# Patient Record
Sex: MALE | Race: WHITE | NOT HISPANIC OR LATINO | Employment: OTHER | URBAN - METROPOLITAN AREA
[De-identification: names, ages, dates, MRNs, and addresses within clinical notes are randomized per-mention and may not be internally consistent; named-entity substitution may affect disease eponyms.]

---

## 2017-01-13 ENCOUNTER — OFFICE VISIT (OUTPATIENT)
Dept: UROLOGY | Facility: CLINIC | Age: 57
End: 2017-01-13
Payer: MEDICARE

## 2017-01-13 ENCOUNTER — APPOINTMENT (OUTPATIENT)
Dept: LAB | Facility: HOSPITAL | Age: 57
End: 2017-01-13
Attending: UROLOGY
Payer: MEDICARE

## 2017-01-13 VITALS — HEART RATE: 65 BPM | SYSTOLIC BLOOD PRESSURE: 131 MMHG | DIASTOLIC BLOOD PRESSURE: 79 MMHG

## 2017-01-13 DIAGNOSIS — R31.29 MICROSCOPIC HEMATURIA: ICD-10-CM

## 2017-01-13 DIAGNOSIS — N30.10 IC (INTERSTITIAL CYSTITIS): Primary | ICD-10-CM

## 2017-01-13 PROCEDURE — 87086 URINE CULTURE/COLONY COUNT: CPT

## 2017-01-13 PROCEDURE — 88112 CYTOPATH CELL ENHANCE TECH: CPT | Performed by: PATHOLOGY

## 2017-01-13 PROCEDURE — 99213 OFFICE O/P EST LOW 20 MIN: CPT | Mod: S$GLB,,, | Performed by: UROLOGY

## 2017-01-13 PROCEDURE — 88112 CYTOPATH CELL ENHANCE TECH: CPT | Mod: 26,,, | Performed by: PATHOLOGY

## 2017-01-13 NOTE — H&P
Subjective:       Patient ID: Gera Doran is a 57 y.o. male.    Chief Complaint: LUTS with Hx of IC. Also noted to have microhematuria.    Past Medical History   Diagnosis Date    Arthritis     Asthma     Chemical exposure      TO LUNG    GERD (gastroesophageal reflux disease)     IC (interstitial cystitis)     RLS (restless legs syndrome)     Sleep apnea      NO MACHINE     Past Surgical History   Procedure Laterality Date    Appendectomy      Tonsils      Hernia repair      Sinus surgery      Stomach surgery for gerd      Tonsillectomy       History reviewed. No pertinent family history.  Social History     Social History    Marital status:      Spouse name: N/A    Number of children: N/A    Years of education: N/A     Social History Main Topics    Smoking status: Never Smoker    Smokeless tobacco: Never Used    Alcohol use Yes      Comment: OCC    Drug use: No    Sexual activity: Not Asked     Other Topics Concern    None     Social History Narrative       Current Outpatient Prescriptions   Medication Sig Dispense Refill    aspirin-salicylamide-caffeine (BC HEADACHE POWDER) 650-195-32 mg Pack Take 1 packet by mouth once daily.      COMBIVENT  mcg/actuation inhaler Inhale 2 puffs into the lungs every 6 (six) hours as needed.       COMBIVENT RESPIMAT  mcg/actuation inhaler       fluticasone (FLONASE) 50 mcg/actuation nasal spray 1 spray by Nasal route as needed.       omeprazole (PRILOSEC) 20 MG capsule Take 20 mg by mouth once daily.      sertraline (ZOLOFT) 100 MG tablet Take 100 mg by mouth once daily. 1/2 daily      [DISCONTINUED] alfuzosin (UROXATRAL) 10 mg Tb24 10 mg daily with breakfast.        No current facility-administered medications for this visit.      Review of patient's allergies indicates:   Allergen Reactions    Imitrex [sumatriptan succinate] Itching    Sulfa (sulfonamide antibiotics) Itching    Amoxicillin Itching       Review of Systems    All other systems reviewed and are negative.      Objective:      Vitals:    01/13/17 1055   BP: 131/79   Pulse: 65     Physical Exam   Constitutional: He appears well-developed.   HENT:   Head: Normocephalic.   Eyes: Pupils are equal, round, and reactive to light.   Cardiovascular: Normal rate.    Pulmonary/Chest: Effort normal.   Abdominal: Soft.   Genitourinary: Rectum normal, prostate normal and penis normal.          Assessment:       1. IC (interstitial cystitis)    2. Microscopic hematuria        Plan:       Cysto Hydrodistention.

## 2017-01-13 NOTE — PROGRESS NOTES
[facility].    Interstitial cystitis with lower urinary tract symptoms.    HISTORY OF PRESENT ILLNESS:  This 57-year-old male returns for routine recheck.    He has a history of interstitial cystitis and at his last visit on 11/10/2016,   he was placed on a trial of VESIcare, but this did not result in any improvement   in his symptoms.  He is coming today and he is requesting to have another   repeat cystoscopy and bladder hydrodistention done again.  He had had a similar   procedure done in February 2014 and he states the effects were excellent in   terms of improving his symptoms and he is thus coming for his preop orders to be   scheduled.  We also had discussed the possibility of Botox, but it was decided   that we will hold off on Botox on today's visit.    His last PSA was 3.3 on 11/10/2016.    UA today did reveal 1+ blood, 8 to 10 rbc's and pH 5.0.    FINAL IMPRESSION:  Interstitial cystitis with lower urinary tract symptoms,   microscopic hematuria of undetermined significance.    RECOMMENDATION:  We will obtain urine for C and S and cytology.  We will   tentatively schedule for cystoscopy and bladder hydrodistention under general   anesthesia on 01/23/2017.  It was also mentioned to the patient that if the   cytology comes back abnormal, we may need to obtain imaging studies that could   include a CT scan and there is a possibility of delaying the cystoscopy   depending on the results, and the patient stated that he understood and agreed.    We will contact him with the urine test results.      FARHANA  dd: 01/13/2017 14:26:25 (CST)  td: 01/14/2017 10:06:45 (CST)  Doc ID   #[id number]  Job ID #[id number]    CC:

## 2017-01-15 LAB — BACTERIA UR CULT: NO GROWTH

## 2017-01-18 ENCOUNTER — HOSPITAL ENCOUNTER (OUTPATIENT)
Dept: PREADMISSION TESTING | Facility: HOSPITAL | Age: 57
Discharge: HOME | End: 2017-01-18
Attending: UROLOGY
Payer: MEDICARE

## 2017-01-18 ENCOUNTER — HOSPITAL ENCOUNTER (OUTPATIENT)
Dept: RADIOLOGY | Facility: HOSPITAL | Age: 57
Discharge: HOME | End: 2017-01-18
Attending: UROLOGY
Payer: MEDICARE

## 2017-01-18 DIAGNOSIS — N30.10 IC (INTERSTITIAL CYSTITIS): Primary | ICD-10-CM

## 2017-01-18 DIAGNOSIS — R31.29 MICROSCOPIC HEMATURIA: ICD-10-CM

## 2017-01-18 LAB
ALBUMIN SERPL BCP-MCNC: 3.5 G/DL
ALP SERPL-CCNC: 62 U/L
ALT SERPL W/O P-5'-P-CCNC: 17 U/L
ANION GAP SERPL CALC-SCNC: 9 MMOL/L
APTT BLDCRRT: 25.7 SEC
AST SERPL-CCNC: 21 U/L
BASOPHILS NFR BLD: 0 %
BILIRUB SERPL-MCNC: 0.3 MG/DL
BUN SERPL-MCNC: 15 MG/DL
CALCIUM SERPL-MCNC: 8.8 MG/DL
CHLORIDE SERPL-SCNC: 108 MMOL/L
CO2 SERPL-SCNC: 25 MMOL/L
CREAT SERPL-MCNC: 1.2 MG/DL
DIFFERENTIAL METHOD: ABNORMAL
EOSINOPHIL NFR BLD: 2 %
ERYTHROCYTE [DISTWIDTH] IN BLOOD BY AUTOMATED COUNT: 13.4 %
EST. GFR  (AFRICAN AMERICAN): >60 ML/MIN/1.73 M^2
EST. GFR  (NON AFRICAN AMERICAN): >60 ML/MIN/1.73 M^2
GLUCOSE SERPL-MCNC: 102 MG/DL
HCT VFR BLD AUTO: 42 %
HGB BLD-MCNC: 14.1 G/DL
INR PPP: 0.9
LYMPHOCYTES NFR BLD: 68 %
MCH RBC QN AUTO: 31.1 PG
MCHC RBC AUTO-ENTMCNC: 33.5 %
MCV RBC AUTO: 93 FL
MONOCYTES NFR BLD: 3 %
NEUTROPHILS NFR BLD: 27 %
PLATELET # BLD AUTO: 202 K/UL
PMV BLD AUTO: 8 FL
POTASSIUM SERPL-SCNC: 4.1 MMOL/L
PROT SERPL-MCNC: 6.2 G/DL
PROTHROMBIN TIME: 9.9 SEC
RBC # BLD AUTO: 4.53 M/UL
SMUDGE CELLS BLD QL SMEAR: PRESENT
SODIUM SERPL-SCNC: 142 MMOL/L
WBC # BLD AUTO: 17.1 K/UL

## 2017-01-18 PROCEDURE — 36415 COLL VENOUS BLD VENIPUNCTURE: CPT

## 2017-01-18 PROCEDURE — 85007 BL SMEAR W/DIFF WBC COUNT: CPT

## 2017-01-18 PROCEDURE — 85060 BLOOD SMEAR INTERPRETATION: CPT | Mod: ,,, | Performed by: PATHOLOGY

## 2017-01-18 PROCEDURE — 85730 THROMBOPLASTIN TIME PARTIAL: CPT

## 2017-01-18 PROCEDURE — 85027 COMPLETE CBC AUTOMATED: CPT

## 2017-01-18 PROCEDURE — 85610 PROTHROMBIN TIME: CPT

## 2017-01-18 PROCEDURE — 71020 XR CHEST PA AND LATERAL PRE-OP: CPT | Mod: TC

## 2017-01-18 PROCEDURE — 93005 ELECTROCARDIOGRAM TRACING: CPT

## 2017-01-18 PROCEDURE — 99900103 DSU ONLY-NO CHARGE-INITIAL HR (STAT)

## 2017-01-18 PROCEDURE — 71020 XR CHEST PA AND LATERAL PRE-OP: CPT | Mod: 26,,, | Performed by: RADIOLOGY

## 2017-01-18 PROCEDURE — 80053 COMPREHEN METABOLIC PANEL: CPT

## 2017-01-18 PROCEDURE — 93010 ELECTROCARDIOGRAM REPORT: CPT | Mod: ,,, | Performed by: INTERNAL MEDICINE

## 2017-01-18 PROCEDURE — 99900104 DSU ONLY-NO CHARGE-EA ADD'L HR (STAT)

## 2017-01-20 ENCOUNTER — ANESTHESIA EVENT (OUTPATIENT)
Dept: SURGERY | Facility: HOSPITAL | Age: 57
End: 2017-01-20
Payer: MEDICARE

## 2017-01-23 ENCOUNTER — HOSPITAL ENCOUNTER (OUTPATIENT)
Facility: HOSPITAL | Age: 57
Discharge: HOME | End: 2017-01-23
Attending: UROLOGY | Admitting: UROLOGY
Payer: MEDICARE

## 2017-01-23 ENCOUNTER — ANESTHESIA (OUTPATIENT)
Dept: SURGERY | Facility: HOSPITAL | Age: 57
End: 2017-01-23
Payer: MEDICARE

## 2017-01-23 DIAGNOSIS — N30.10 IC (INTERSTITIAL CYSTITIS): ICD-10-CM

## 2017-01-23 DIAGNOSIS — R31.29 MICROSCOPIC HEMATURIA: ICD-10-CM

## 2017-01-23 LAB
BASOPHILS NFR BLD: 3 %
DIFFERENTIAL METHOD: ABNORMAL
EOSINOPHIL NFR BLD: 3 %
ERYTHROCYTE [DISTWIDTH] IN BLOOD BY AUTOMATED COUNT: 13.5 %
HCT VFR BLD AUTO: 39.2 %
HGB BLD-MCNC: 12.9 G/DL
LYMPHOCYTES NFR BLD: 65 %
MCH RBC QN AUTO: 30.7 PG
MCHC RBC AUTO-ENTMCNC: 33 %
MCV RBC AUTO: 93 FL
MONOCYTES NFR BLD: 3 %
NEUTROPHILS NFR BLD: 26 %
PATH REV BLD -IMP: NORMAL
PLATELET # BLD AUTO: 173 K/UL
PLATELET BLD QL SMEAR: ABNORMAL
PMV BLD AUTO: 7.8 FL
RBC # BLD AUTO: 4.22 M/UL
SMUDGE CELLS BLD QL SMEAR: PRESENT
WBC # BLD AUTO: 12 K/UL

## 2017-01-23 PROCEDURE — 85027 COMPLETE CBC AUTOMATED: CPT

## 2017-01-23 PROCEDURE — 36415 COLL VENOUS BLD VENIPUNCTURE: CPT

## 2017-01-23 PROCEDURE — 71000015 HC POSTOP RECOV 1ST HR: Performed by: UROLOGY

## 2017-01-23 PROCEDURE — 37000008 HC ANESTHESIA 1ST 15 MINUTES: Performed by: UROLOGY

## 2017-01-23 PROCEDURE — 99900103 DSU ONLY-NO CHARGE-INITIAL HR (STAT): Performed by: UROLOGY

## 2017-01-23 PROCEDURE — 63600175 PHARM REV CODE 636 W HCPCS: Performed by: NURSE ANESTHETIST, CERTIFIED REGISTERED

## 2017-01-23 PROCEDURE — 36000706: Performed by: UROLOGY

## 2017-01-23 PROCEDURE — 71000033 HC RECOVERY, INTIAL HOUR: Performed by: UROLOGY

## 2017-01-23 PROCEDURE — 25000003 PHARM REV CODE 250: Performed by: ANESTHESIOLOGY

## 2017-01-23 PROCEDURE — D9220A PRA ANESTHESIA: Mod: CRNA,,, | Performed by: NURSE ANESTHETIST, CERTIFIED REGISTERED

## 2017-01-23 PROCEDURE — 37000009 HC ANESTHESIA EA ADD 15 MINS: Performed by: UROLOGY

## 2017-01-23 PROCEDURE — 52260 CYSTOSCOPY AND TREATMENT: CPT | Mod: ,,, | Performed by: UROLOGY

## 2017-01-23 PROCEDURE — 25000003 PHARM REV CODE 250: Performed by: NURSE ANESTHETIST, CERTIFIED REGISTERED

## 2017-01-23 PROCEDURE — 99900104 DSU ONLY-NO CHARGE-EA ADD'L HR (STAT): Performed by: UROLOGY

## 2017-01-23 PROCEDURE — 63600175 PHARM REV CODE 636 W HCPCS

## 2017-01-23 PROCEDURE — 85007 BL SMEAR W/DIFF WBC COUNT: CPT

## 2017-01-23 PROCEDURE — 25000003 PHARM REV CODE 250: Performed by: UROLOGY

## 2017-01-23 PROCEDURE — D9220A PRA ANESTHESIA: Mod: ANES,,, | Performed by: ANESTHESIOLOGY

## 2017-01-23 PROCEDURE — 36000707: Performed by: UROLOGY

## 2017-01-23 RX ORDER — FENTANYL CITRATE 50 UG/ML
25 INJECTION, SOLUTION INTRAMUSCULAR; INTRAVENOUS EVERY 5 MIN PRN
Status: DISCONTINUED | OUTPATIENT
Start: 2017-01-23 | End: 2017-01-23 | Stop reason: HOSPADM

## 2017-01-23 RX ORDER — DEXAMETHASONE SODIUM PHOSPHATE 4 MG/ML
INJECTION, SOLUTION INTRA-ARTICULAR; INTRALESIONAL; INTRAMUSCULAR; INTRAVENOUS; SOFT TISSUE
Status: DISCONTINUED | OUTPATIENT
Start: 2017-01-23 | End: 2017-01-23

## 2017-01-23 RX ORDER — CIPROFLOXACIN 2 MG/ML
400 INJECTION, SOLUTION INTRAVENOUS
Status: DISCONTINUED | OUTPATIENT
Start: 2017-01-23 | End: 2017-01-23 | Stop reason: HOSPADM

## 2017-01-23 RX ORDER — ONDANSETRON HYDROCHLORIDE 2 MG/ML
INJECTION, SOLUTION INTRAMUSCULAR; INTRAVENOUS
Status: DISCONTINUED | OUTPATIENT
Start: 2017-01-23 | End: 2017-01-23

## 2017-01-23 RX ORDER — MEPERIDINE HYDROCHLORIDE 25 MG/ML
12.5 INJECTION INTRAMUSCULAR; INTRAVENOUS; SUBCUTANEOUS ONCE AS NEEDED
Status: DISCONTINUED | OUTPATIENT
Start: 2017-01-23 | End: 2017-01-23 | Stop reason: HOSPADM

## 2017-01-23 RX ORDER — FENTANYL CITRATE 50 UG/ML
INJECTION, SOLUTION INTRAMUSCULAR; INTRAVENOUS
Status: DISCONTINUED | OUTPATIENT
Start: 2017-01-23 | End: 2017-01-23

## 2017-01-23 RX ORDER — OXYCODONE HYDROCHLORIDE 5 MG/1
5 TABLET ORAL ONCE AS NEEDED
Status: COMPLETED | OUTPATIENT
Start: 2017-01-24 | End: 2017-01-23

## 2017-01-23 RX ORDER — HYDROCODONE BITARTRATE AND ACETAMINOPHEN 5; 325 MG/1; MG/1
1 TABLET ORAL EVERY 4 HOURS PRN
Status: CANCELLED | OUTPATIENT
Start: 2017-01-23

## 2017-01-23 RX ORDER — PROPOFOL 10 MG/ML
VIAL (ML) INTRAVENOUS
Status: DISCONTINUED | OUTPATIENT
Start: 2017-01-23 | End: 2017-01-23

## 2017-01-23 RX ORDER — SODIUM CHLORIDE 0.9 % (FLUSH) 0.9 %
3 SYRINGE (ML) INJECTION
Status: DISCONTINUED | OUTPATIENT
Start: 2017-01-23 | End: 2017-01-23 | Stop reason: HOSPADM

## 2017-01-23 RX ORDER — CIPROFLOXACIN 500 MG/1
500 TABLET ORAL 2 TIMES DAILY
Qty: 20 TABLET | Refills: 0 | Status: SHIPPED | OUTPATIENT
Start: 2017-01-23 | End: 2017-02-02

## 2017-01-23 RX ORDER — DIPHENHYDRAMINE HYDROCHLORIDE 50 MG/ML
25 INJECTION INTRAMUSCULAR; INTRAVENOUS EVERY 6 HOURS PRN
Status: DISCONTINUED | OUTPATIENT
Start: 2017-01-23 | End: 2017-01-23 | Stop reason: HOSPADM

## 2017-01-23 RX ORDER — LIDOCAINE HYDROCHLORIDE 10 MG/ML
1 INJECTION, SOLUTION EPIDURAL; INFILTRATION; INTRACAUDAL; PERINEURAL ONCE
Status: COMPLETED | OUTPATIENT
Start: 2017-01-23 | End: 2017-01-23

## 2017-01-23 RX ORDER — ONDANSETRON 2 MG/ML
4 INJECTION INTRAMUSCULAR; INTRAVENOUS ONCE
Status: DISCONTINUED | OUTPATIENT
Start: 2017-01-23 | End: 2017-01-23 | Stop reason: HOSPADM

## 2017-01-23 RX ORDER — HYDROCODONE BITARTRATE AND ACETAMINOPHEN 5; 325 MG/1; MG/1
1 TABLET ORAL
Qty: 20 TABLET | Refills: 0 | Status: SHIPPED | OUTPATIENT
Start: 2017-01-23 | End: 2017-02-10 | Stop reason: ALTCHOICE

## 2017-01-23 RX ORDER — LIDOCAINE HCL/PF 100 MG/5ML
SYRINGE (ML) INTRAVENOUS
Status: DISCONTINUED | OUTPATIENT
Start: 2017-01-23 | End: 2017-01-23

## 2017-01-23 RX ORDER — PHENAZOPYRIDINE HYDROCHLORIDE 100 MG/1
200 TABLET, FILM COATED ORAL
Qty: 20 TABLET | Refills: 0 | Status: SHIPPED | OUTPATIENT
Start: 2017-01-23 | End: 2017-01-27

## 2017-01-23 RX ORDER — MIDAZOLAM HYDROCHLORIDE 1 MG/ML
INJECTION INTRAMUSCULAR; INTRAVENOUS
Status: DISCONTINUED | OUTPATIENT
Start: 2017-01-23 | End: 2017-01-23

## 2017-01-23 RX ORDER — LIDOCAINE HYDROCHLORIDE 20 MG/ML
JELLY TOPICAL
Status: DISCONTINUED | OUTPATIENT
Start: 2017-01-23 | End: 2017-01-23 | Stop reason: HOSPADM

## 2017-01-23 RX ORDER — HYDROMORPHONE HYDROCHLORIDE 2 MG/ML
0.2 INJECTION, SOLUTION INTRAMUSCULAR; INTRAVENOUS; SUBCUTANEOUS EVERY 5 MIN PRN
Status: DISCONTINUED | OUTPATIENT
Start: 2017-01-23 | End: 2017-01-23 | Stop reason: HOSPADM

## 2017-01-23 RX ORDER — SODIUM CHLORIDE, SODIUM LACTATE, POTASSIUM CHLORIDE, CALCIUM CHLORIDE 600; 310; 30; 20 MG/100ML; MG/100ML; MG/100ML; MG/100ML
INJECTION, SOLUTION INTRAVENOUS CONTINUOUS
Status: DISCONTINUED | OUTPATIENT
Start: 2017-01-23 | End: 2017-01-23 | Stop reason: HOSPADM

## 2017-01-23 RX ORDER — CIPROFLOXACIN 2 MG/ML
INJECTION, SOLUTION INTRAVENOUS
Status: COMPLETED
Start: 2017-01-23 | End: 2017-01-23

## 2017-01-23 RX ORDER — PHENAZOPYRIDINE HYDROCHLORIDE 200 MG/1
200 TABLET, FILM COATED ORAL ONCE
Status: COMPLETED | OUTPATIENT
Start: 2017-01-23 | End: 2017-01-23

## 2017-01-23 RX ORDER — SODIUM CHLORIDE, SODIUM LACTATE, POTASSIUM CHLORIDE, CALCIUM CHLORIDE 600; 310; 30; 20 MG/100ML; MG/100ML; MG/100ML; MG/100ML
75 INJECTION, SOLUTION INTRAVENOUS CONTINUOUS
Status: DISCONTINUED | OUTPATIENT
Start: 2017-01-23 | End: 2017-01-23 | Stop reason: HOSPADM

## 2017-01-23 RX ADMIN — EPHEDRINE SULFATE 10 MG: 50 INJECTION, SOLUTION INTRAMUSCULAR; INTRAVENOUS; SUBCUTANEOUS at 09:01

## 2017-01-23 RX ADMIN — OXYCODONE HYDROCHLORIDE 5 MG: 5 TABLET ORAL at 09:01

## 2017-01-23 RX ADMIN — LIDOCAINE HYDROCHLORIDE 100 MG: 20 INJECTION, SOLUTION INTRAVENOUS at 08:01

## 2017-01-23 RX ADMIN — PHENAZOPYRIDINE HYDROCHLORIDE 200 MG: 200 TABLET ORAL at 09:01

## 2017-01-23 RX ADMIN — SODIUM CHLORIDE, SODIUM LACTATE, POTASSIUM CHLORIDE, AND CALCIUM CHLORIDE: .6; .31; .03; .02 INJECTION, SOLUTION INTRAVENOUS at 07:01

## 2017-01-23 RX ADMIN — ONDANSETRON 4 MG: 2 INJECTION, SOLUTION INTRAMUSCULAR; INTRAVENOUS at 08:01

## 2017-01-23 RX ADMIN — LIDOCAINE HYDROCHLORIDE 10 MG: 10 INJECTION, SOLUTION EPIDURAL; INFILTRATION; INTRACAUDAL; PERINEURAL at 07:01

## 2017-01-23 RX ADMIN — CIPROFLOXACIN 400 MG: 2 INJECTION, SOLUTION INTRAVENOUS at 08:01

## 2017-01-23 RX ADMIN — MIDAZOLAM HYDROCHLORIDE 2 MG: 1 INJECTION, SOLUTION INTRAMUSCULAR; INTRAVENOUS at 08:01

## 2017-01-23 RX ADMIN — FENTANYL CITRATE 100 MCG: 50 INJECTION, SOLUTION INTRAMUSCULAR; INTRAVENOUS at 08:01

## 2017-01-23 RX ADMIN — PROPOFOL 200 MG: 10 INJECTION, EMULSION INTRAVENOUS at 08:01

## 2017-01-23 RX ADMIN — DEXAMETHASONE SODIUM PHOSPHATE 4 MG: 4 INJECTION, SOLUTION INTRAMUSCULAR; INTRAVENOUS at 08:01

## 2017-01-23 NOTE — IP AVS SNAPSHOT
[address] [place] [address]  Phone: [phone]           Patient Discharge Instructions     Our goal is to set you up for success. This packet includes information on your condition, medications, and your home care. It will help you to care for yourself so you don't get sicker and need to go back to the hospital.     Please ask your nurse if you have any questions.        There are many details to remember when preparing to leave the hospital. Here is what you will need to do:    1. Take your medicine. If you are prescribed medications, review your Medication List in the following pages. You may have new medications to  at the pharmacy and others that you'll need to stop taking. Review the instructions for how and when to take your medications. Talk with your doctor or nurses if you are unsure of what to do.     2. Go to your follow-up appointments. Specific follow-up information is listed in the following pages. Your may be contacted by a transition nurse or clinical provider about future appointments. Be sure we have all of the phone numbers to reach you, if needed. Please contact your provider's office if you are unable to make an appointment.     3. Watch for warning signs. Your doctor or nurse will give you detailed warning signs to watch for and when to call for assistance. These instructions may also include educational information about your condition. If you experience any of warning signs to your health, call your doctor.               [facility]  Unless otherwise directed by your provider, please contact [facility], our nurse care line that is available for 24/7 assistance.     [phone] (toll-free)    Registered nurses in the [facility] provide clinical advisement, health education, appointment booking, and other advisory services.                    ** Verify the list of medication(s) below is accurate and up to date.  Carry this with you in case of emergency. If your medications have changed, please notify your healthcare provider.             Medication List      START taking these medications        Additional Info                      ciprofloxacin HCl 500 MG tablet   Commonly known as:  CIPRO   Quantity:  20 tablet   Refills:  0   Dose:  500 mg    Instructions:  Take 1 tablet (500 mg total) by mouth 2 (two) times daily.     Begin Date    AM    Noon    PM    Bedtime       hydrocodone-acetaminophen 5-325mg 5-325 mg per tablet   Commonly known as:  NORCO   Quantity:  20 tablet   Refills:  0   Dose:  1 tablet    Instructions:  Take 1 tablet by mouth every 4 to 6 hours as needed for Pain.     Begin Date    AM    Noon    PM    Bedtime       phenazopyridine 100 MG tablet   Commonly known as:  PYRIDIUM   Quantity:  20 tablet   Refills:  0   Dose:  200 mg    Instructions:  Take 2 tablets (200 mg total) by mouth 3 (three) times daily with meals.     Begin Date    AM    Noon    PM    Bedtime         CONTINUE taking these medications        Additional Info                      BC HEADACHE POWDER 650-195-32 mg Pack   Refills:  0   Dose:  1 packet   Generic drug:  aspirin-salicylamide-caffeine    Instructions:  Take 1 packet by mouth once daily.     Begin Date    AM    Noon    PM    Bedtime       * COMBIVENT  mcg/actuation inhaler   Refills:  0   Dose:  2 puff   Generic drug:  albuterol-ipratropium  mcg    Instructions:  Inhale 2 puffs into the lungs every 6 (six) hours as needed.     Begin Date    AM    Noon    PM    Bedtime       * COMBIVENT RESPIMAT  mcg/actuation inhaler   Refills:  0   Generic drug:  ipratropium-albuterol      Begin Date    AM    Noon    PM    Bedtime       fluticasone 50 mcg/actuation nasal spray   Commonly known as:  FLONASE   Refills:  0   Dose:  1 spray    Instructions:  1 spray by Nasal route as needed.     Begin Date    AM    Noon    PM    Bedtime       omeprazole 20 MG capsule   Commonly known  as:  PRILOSEC   Refills:  0   Dose:  20 mg    Instructions:  Take 20 mg by mouth once daily.     Begin Date    AM    Noon    PM    Bedtime       sertraline 100 MG tablet   Commonly known as:  ZOLOFT   Refills:  0   Dose:  100 mg   Indications:  DEPRESSION    Instructions:  Take 100 mg by mouth once daily. 1/2 daily     Begin Date    AM    Noon    PM    Bedtime       * Notice:  This list has 2 medication(s) that are the same as other medications prescribed for you. Read the directions carefully, and ask your doctor or other care provider to review them with you.      STOP taking these medications     alfuzosin 10 mg Tb24   Commonly known as:  UROXATRAL            Where to Get Your Medications      These medications were sent to [id number]  [place], [place] - [address]  [address], [place] [address]     Phone:  [phone]     ciprofloxacin HCl 500 MG tablet    phenazopyridine 100 MG tablet         You can get these medications from any pharmacy     Bring a paper prescription for each of these medications     hydrocodone-acetaminophen 5-325mg 5-325 mg per tablet                  Please bring to all follow up appointments:    1. A copy of your discharge instructions.  2. All medicines you are currently taking in their original bottles.  3. Identification and insurance card.    Please arrive 15 minutes ahead of scheduled appointment time.    Please call 24 hours in advance if you must reschedule your appointment and/or time.        Your Scheduled Appointments     Feb 10, 2017  9:30 AM CST   Established Patient Visit with Damian De La Cruz MD   [place] [facility]    [address] [facility] [place] [address] [phone]              Follow-up Information     Follow up In 3 weeks.        Discharge Instructions     Future Orders    Activity as tolerated     Diet general     Questions:    Total calories:      Fat restriction, if any:      Protein restriction, if any:      Na  restriction, if any:      Fluid restriction:      Additional restrictions:          Discharge Instructions         General Information:    1.  Do not drink alcoholic beverages including beer for 24 hours or as long as you are on pain medication..  2.  Do not drive a motor vehicle, operate machinery or power tools, or signs legal papers for 24 hours or as long as you are on pain medication.   3.  You may experience light-headedness, dizziness, and sleepiness following surgery. Please do not stay alone. A responsible adult should be with you for this 24 hour period.  4.  Go home and rest.    5. Progress slowly to a normal diet unless instructed.  Otherwise, begin with liquids such as soft drinks, then soup and crackers working up to solid foods. Drink plenty of nonalcoholic fluids.  6.  Certain anesthetics and pain medications produce nausea and vomiting in certain       individuals. If nausea becomes a problem at home, call you doctor.    7. A nurse will be calling you sometime after surgery. Do not be alarmed. This is our way of finding out how you are doing.    8. Several times every hour while you are awake, take 2-3 deep breaths and cough. If you had stomach surgery hold a pillow or rolled towel firmly against your stomach before you cough. This will help with any pain the cough might cause.  9. Several times every hour while you are awake, pump and flex your feet 5-6 times and do foot circles. This will help prevent blood clots.    10.Call your doctor for severe pain, bleeding, fever, or signs or symptoms of infection (pain, swelling, redness, foul odor, drainage).    Discharge Instructions: After Your Surgery/Procedure  Youve just had surgery. During surgery you were given medicine called anesthesia to keep you relaxed and free of pain. After surgery you may have some pain or nausea. This is common. Here are some tips for feeling better and getting well after surgery.     Stay on schedule with your medication.  "  Going home  Your doctor or nurse will show you how to take care of yourself when you go home. He or she will also answer your questions. Have an adult family member or friend drive you home.      For your safety we recommend these precaution for the first 24 hours after your procedure:  Do not drive or use heavy equipment.  Do not make important decisions or sign legal papers.  Do not drink alcohol.  Have someone stay with you, if needed. He or she can watch for problems and help keep you safe.  Your concentration, balance, coordination, and judgement may be impaired for many hours after anesthesia.  Use caution when ambulating or standing up.     You may feel weak and "washed out" after anesthesia and surgery.      Subtle residual effects of general anesthesia or sedation with regional / local anesthesia can last more than 24 hours.  Rest for the remainder of the day or longer if your Doctor/Surgeon has advised you to do so.  Although you may feel normal within the first 24 hours, your reflexes and mental ability may be impaired without you realizing it.  You may feel dizzy, lightheaded or sleepy for 24 hours or longer.      Be sure to go to all follow-up visits with your doctor. And rest after your surgery for as long as your doctor tells you to.  Coping with pain  If you have pain after surgery, pain medicine will help you feel better. Take it as told, before pain becomes severe. Also, ask your doctor or pharmacist about other ways to control pain. This might be with heat, ice, or relaxation. And follow any other instructions your surgeon or nurse gives you.  Tips for taking pain medicine  To get the best relief possible, remember these points:  Pain medicines can upset your stomach. Taking them with a little food may help.  Most pain relievers taken by mouth need at least 20 to 30 minutes to start to work.  Taking medicine on a schedule can help you remember to take it. Try to time your medicine so that you " can take it before starting an activity. This might be before you get dressed, go for a walk, or sit down for dinner.  Constipation is a common side effect of pain medicines. Call your doctor before taking any medicines such as laxatives or stool softeners to help ease constipation. Also ask if you should skip any foods. Drinking lots of fluids and eating foods such as fruits and vegetables that are high in fiber can also help. Remember, do not take laxatives unless your surgeon has prescribed them.  Drinking alcohol and taking pain medicine can cause dizziness and slow your breathing. It can even be deadly. Do not drink alcohol while taking pain medicine.  Pain medicine can make you react more slowly to things. Do not drive or run machinery while taking pain medicine.  Your health care provider may tell you to take acetaminophen to help ease your pain. Ask him or her how much you are supposed to take each day. Acetaminophen or other pain relievers may interact with your prescription medicines or other over-the-counter (OTC) drugs. Some prescription medicines have acetaminophen and other ingredients. Using both prescription and OTC acetaminophen for pain can cause you to overdose. Read the labels on your OTC medicines with care. This will help you to clearly know the list of ingredients, how much to take, and any warnings. It may also help you not take too much acetaminophen. If you have questions or do not understand the information, ask your pharmacist or health care provider to explain it to you before you take the OTC medicine.  Managing nausea  Some people have an upset stomach after surgery. This is often because of anesthesia, pain, or pain medicine, or the stress of surgery. These tips will help you handle nausea and eat healthy foods as you get better. If you were on a special food plan before surgery, ask your doctor if you should follow it while you get better. These tips may help:  Do not push yourself  to eat. Your body will tell you when to eat and how much.  Start off with clear liquids and soup. They are easier to digest.  Next try semi-solid foods, such as mashed potatoes, applesauce, and gelatin, as you feel ready.  Slowly move to solid foods. Dont eat fatty, rich, or spicy foods at first.  Do not force yourself to have 3 large meals a day. Instead eat smaller amounts more often.  Take pain medicines with a small amount of solid food, such as crackers or toast, to avoid nausea.     Call your surgeon if  You still have pain an hour after taking medicine. The medicine may not be strong enough.  You feel too sleepy, dizzy, or groggy. The medicine may be too strong.  You have side effects like nausea, vomiting, or skin changes, such as rash, itching, or hives.       If you have obstructive sleep apnea  You were given anesthesia medicine during surgery to keep you comfortable and free of pain. After surgery, you may have more apnea spells because of this medicine and other medicines you were given. The spells may last longer than usual.   At home:  Keep using the continuous positive airway pressure (CPAP) device when you sleep. Unless your health care provider tells you not to, use it when you sleep, day or night. CPAP is a common device used to treat obstructive sleep apnea.  Talk with your provider before taking any pain medicine, muscle relaxants, or sedatives. Your provider will tell you about the possible dangers of taking these medicines.  © 2262-8116 [employer]. [address] [place] [address], [place] [address]. All rights reserved. This information is not intended as a substitute for professional medical care. Always follow your healthcare professional's instructions.    Cystoscopy  Cystoscopy is a procedure that lets your doctor look directly inside your urethra and bladder. It can be used to:  Help diagnose a problem with your urethra, bladder, or kidneys.  Take a sample (biopsy) of bladder or  urethral tissue.  Treat certain problems (such as removing kidney stones).  Place a stent to bypass an obstruction.  Take special X-rays of the kidneys.  Based on the findings, your doctor may recommend other tests or treatments.  What is a cystoscope?  A cystoscope is a telescope-like instrument that contains lenses and fiberoptics (small glass wires that make bright light). The cystoscope may be straight and rigid, or flexible to bend around curves in the urethra. The doctor may look directly into the cystoscope, or project the image onto a monitor.  Getting ready  Ask your doctor if you should stop taking any medications prior to the procedure.  Ask whether you should avoid eating or drinking anything after midnight before the procedure.  Follow any other instructions your doctor gives you.  Tell your doctor before the exam if you:  Take any medications, such as aspirin or blood thinners  Have allergies to any medications  Are pregnant   The procedure  Cystoscopy is done in the doctors office or hospital. The doctor and a nurse are present during the procedure. It takes only a few minutes, longer if a biopsy, X-ray, or treatment needs to be done.  During the procedure:  You lie on an exam table on your back, knees bent and legs apart. You are covered with a drape.  Your urethra and the area around it are washed. Anesthetic jelly may be applied to numb the urethra. Other pain medication is usually not needed. In some cases, you may be offered a mild sedative to help you relax. If a more extensive procedure is to be done, such as a biopsy or kidney stone removal, general anesthesia may be needed.  The cystoscope is inserted. A sterile fluid is put into the bladder to expand it. You may feel pressure from this fluid.  When the procedure is done, the cystoscope is removed.  After the procedure  If you had a sedative, general anesthesia, or spinal anesthesia, you must have someone drive you home. Once youre  home:  Drink plenty of fluids.  You may have burning or light bleeding when you urinate--this is normal.  Medications may be prescribed to ease any discomfort or prevent infection. Take these as directed.  Call your doctor if you have heavy bleeding or blood clots, burning that lasts more than a day, a fever over 100°F  (38° C), or trouble urinating.  © 3751-8840 [employer]. [address] [place] [address], [place] [address]. All rights reserved. This information is not intended as a substitute for professional medical care. Always follow your healthcare professional's instructions.        Understanding Hydrodistention with Cystoscopy    Hydrodistention is a procedure that fills up your bladder with water. It is used to help find out what may be causing your bladder pain. During the procedure a long, thin tube (cystoscope) is used. It has a lens and a light on one end. This tube helps your healthcare provider see inside your bladder. It is put into your bladder through your urethra. Thats the tube that passes urine out of your body.  How to say it  [id number]-[id number] [id number]   Why hydrodistention with cystoscopy is done  This procedure is often done to figure out the cause of bladder pain. It may help diagnose bladder pain syndrome (BPS), also called interstitial cystitis (IC). If you have this health problem, you may feel pain when your bladder fills with urine. You may also need to pass urine more often.  This procedure is also sometimes done to treat BPS. It may be tried if other treatments, such as medicine, dont work.  How hydrodistention with cystoscopy is done  This may be done in a hospital or at an outpatient facility. During the procedure:  · You are given medicine so you wont feel any pain. It may also make you fall asleep.  · Your healthcare provider puts the cystoscope into your urethra. He or she gently moves it forward into your bladder.  · Using the cystoscope, he or she slowly  "fills up your bladder with as much water as possible. This helps find out how much fluid your bladder can hold.   · Your bladder is then drained and filled again.  · Your healthcare provider may look at your bladder lining with the cystoscope. He or she will see if there are any sores or other possible causes for your symptoms.  · Your bladder is drained.  Risks of hydrodistention with cystoscopy  · Infection  · Symptoms get worse  © 1613-7675 "[employer]. [address] [place] [address]. All rights reserved. This information is not intended as a substitute for professional medical care. Always follow your healthcare professional's instructions.            Primary Diagnosis     Your primary diagnosis was:  Interstitial Cystitis      Admission Information     Date & Time Provider Department CSN    1/23/2017  6:59 AM Damian De La Cruz MD [facility] [id number]      Care Providers     Provider Role Specialty Primary office phone    Damian De La Cruz MD Attending Provider Urology [phone]    Damian De La Cruz MD Surgeon  Urology [phone]      Your Vitals Were     BP Pulse Temp Resp Height Weight    125/80 (BP Location: Left arm, Patient Position: Lying, BP Method: Automatic) 63 97.3 °F (36.3 °C) (Oral) 16 5' 11" (1.803 m) 90.7 kg (200 lb)    SpO2 BMI             97% 27.89 kg/m2         Recent Lab Values     No lab values to display.      Pending Labs     Order Current Status    CBC auto differential In process      Allergies as of 1/23/2017        Reactions    Imitrex [Sumatriptan Succinate] Itching    Sulfa (Sulfonamide Antibiotics) Itching    Amoxicillin Itching      Advance Directives     An advance directive is a document which, in the event you are no longer able to make decisions for yourself, tells your healthcare team what kind of treatment you do or do not want to receive, or who you would like to make those decisions for you.  If you do not currently have an " advance directive, [facility] encourages you to create one.  For more information call:  [phone]), [phone] ([phone]),  or log on to [email or web address].        Language Assistance Services     ATTENTION: Language assistance services are available, free of charge. Please call [phone].      ATENCIÓN: Si habla español, tiene a mckeon disposición servicios gratuitos de asistencia lingüística. Llame al [phone].     CHÚ Ý: N?u b?n nói Ti?ng Vi?t, có các d?ch v? h? tr? ngôn ng? mi?n phí dành cho b?n. G?i s? [phone].         [facility] complies with applicable Federal civil rights laws and does not discriminate on the basis of race, color, national origin, age, disability, or sex.

## 2017-01-23 NOTE — INTERVAL H&P NOTE
The patient has been examined and the H&P has been reviewed:    I concur with the findings and no changes have occurred since H&P was written.    Anesthesia/Surgery risks, benefits and alternative options discussed and understood by patient/family.          Active Hospital Problems    Diagnosis  POA    IC (interstitial cystitis) [N30.10]  Yes      Resolved Hospital Problems    Diagnosis Date Resolved POA   No resolved problems to display.

## 2017-01-23 NOTE — OR NURSING
Discharge instructions discussed with patient and significant other.  rx given and informed of e-prescribed rx to pharmacy of record.  Verbalized understanding.  Patient dressing at bedside.

## 2017-01-23 NOTE — BRIEF OP NOTE
Operative Note     SUMMARY     Surgery Date: 1/23/2017     Surgeon(s) and Role:     * Damian De La Cruz MD - Primary    Pre-op Diagnosis:  IC (interstitial cystitis) [N30.10]  Microscopic hematuria [R31.29]    Post-op Diagnosis:  IC (interstitial cystitis) [N30.10]  Microscopic hematuria [R31.29]    Procedure(s) (LRB):  CYSTOSCOPY WITH HYDRODISTENSION (N/A)    Anesthesia: General    Findings/Key Components:  See Op Note      Estimated Blood Loss: * No values recorded between 1/23/2017  9:03 AM and 1/23/2017  9:21 AM *         Specimens     None            Discharge Note      SUMMARY     Admit Date: 1/23/2017    Attending Physician: Damian De La Cruz MD     Discharge Physician: Damian De La Cruz MD    Discharge Date: 1/23/2017     Final Diagnosis: IC (interstitial cystitis) [N30.10]  Microscopic hematuria [R31.29]    Hospital Course: uneventful, going home same day    Disposition: Home or Self Care    Patient Instructions:   Current Discharge Medication List      START taking these medications    Details   ciprofloxacin HCl (CIPRO) 500 MG tablet Take 1 tablet (500 mg total) by mouth 2 (two) times daily.  Qty: 20 tablet, Refills: 0      hydrocodone-acetaminophen 5-325mg (NORCO) 5-325 mg per tablet Take 1 tablet by mouth every 4 to 6 hours as needed for Pain.  Qty: 20 tablet, Refills: 0      phenazopyridine (PYRIDIUM) 100 MG tablet Take 2 tablets (200 mg total) by mouth 3 (three) times daily with meals.  Qty: 20 tablet, Refills: 0         CONTINUE these medications which have NOT CHANGED    Details   aspirin-salicylamide-caffeine (BC HEADACHE POWDER) 650-195-32 mg Pack Take 1 packet by mouth once daily.      COMBIVENT  mcg/actuation inhaler Inhale 2 puffs into the lungs every 6 (six) hours as needed.       fluticasone (FLONASE) 50 mcg/actuation nasal spray 1 spray by Nasal route as needed.       omeprazole (PRILOSEC) 20 MG capsule Take 20 mg by mouth once daily.      sertraline (ZOLOFT) 100 MG tablet Take 100 mg by  mouth once daily. 1/2 daily      COMBIVENT RESPIMAT  mcg/actuation inhaler          STOP taking these medications       alfuzosin (UROXATRAL) 10 mg Tb24 Comments:   Reason for Stopping:               Discharge Procedure Orders (must include Diet, Follow-up, Activity)  Resume previous diet.  Follow up with PCP as needed.

## 2017-01-23 NOTE — TRANSFER OF CARE
"Anesthesia Transfer of Care Note    Patient: Gera Doran    Procedure(s) Performed: Procedure(s) (LRB):  CYSTOSCOPY WITH HYDRODISTENSION (N/A)    Patient location: PACU    Anesthesia Type: general    Transport from OR: Transported from OR on 2-3 L/min O2 by NC with adequate spontaneous ventilation    Post pain: adequate analgesia    Post assessment: no apparent anesthetic complications and tolerated procedure well    Post vital signs: stable    Level of consciousness: sedated    Nausea/Vomiting: no nausea/vomiting    Complications: none          Last vitals:   Visit Vitals    /80 (BP Location: Left arm, Patient Position: Lying, BP Method: Automatic)    Pulse 63    Temp 36.1 °C (97 °F) (Oral)    Resp 16    Ht 5' 11" (1.803 m)    Wt 90.7 kg (200 lb)    SpO2 97%    BMI 27.89 kg/m2     "

## 2017-01-23 NOTE — OR NURSING
Discharged home per wheelchair per personal vehicle with significant other.  aao x 4.  No complaints voiced.

## 2017-01-23 NOTE — OP NOTE
DATE OF PROCEDURE:  01/23/2017    PREOPERATIVE DIAGNOSIS:  Interstitial cystitis.    POSTOPERATIVE DIAGNOSIS:  Interstitial cystitis.    PROCEDURES PERFORMED:  Cystourethroscopy with bladder hydrodistention under   general anesthesia.    SURGEON:  Damian De La Cruz M.D.    ANESTHESIA:  General.    PROCEDURE IN DETAIL:  The patient was brought to the Cystoscopy Suite and after   adequate induction of general anesthesia, he was placed in lithotomy position.    Genitalia were prepped and draped in usual manner.  A 22-Serbian cystoscope   sheath with a foroblique lens and video camera was then inserted under direct   vision into urethra.  Examination of the anterior urethra was unremarkable.  The   instrument was then advanced towards the prostatic urethra where the   verumontanum was encountered.  The prostatic urethra was wide open with no   evidence of any obstruction and no lesions.  The interior of the bladder was   then examined.  The trigone was symmetrically configurated.  Both orifices were   slit-like and had clear efflux, and the bladder mucosa was completely smooth   throughout with no evidence of any trabeculation or diverticula, no cellules, no   lesions, no hemorrhagic sites.  The bladder was then distended with irrigating   solution, initially able to tolerate a capacity of 600 mL, and it was then   drained and reexamined and there were no significant change other than a few   small petechial hemorrhagic glomerulizations.  The bladder hydrodistention was   carried out a second time under pressure.  An additional irrigating fluid was   introduced into the bladder under pressure for up to 800 mL.  It was then   drained and reexamined, and there were multiple petechial hemorrhagic   glomerulizations consistent with interstitial cystitis, scattered throughout the   bladder wall.  Bladder was then drained, instruments removed.  A digital rectal   examination was then performed and this revealed essentially  benign, smooth 20   g prostate with no nodules and no masses palpable.  The patient thus having   tolerated the procedure well, was then transferred to the Recovery Room in   stable condition.      SISSY  dd: 01/23/2017 09:21:27 (CST)  td: 01/23/2017 09:58:06 (CST)  Doc ID   #[id number]  Job ID #[id number]    CC:

## 2017-01-23 NOTE — DISCHARGE INSTRUCTIONS
General Information:    1.  Do not drink alcoholic beverages including beer for 24 hours or as long as you are on pain medication..  2.  Do not drive a motor vehicle, operate machinery or power tools, or signs legal papers for 24 hours or as long as you are on pain medication.   3.  You may experience light-headedness, dizziness, and sleepiness following surgery. Please do not stay alone. A responsible adult should be with you for this 24 hour period.  4.  Go home and rest.    5. Progress slowly to a normal diet unless instructed.  Otherwise, begin with liquids such as soft drinks, then soup and crackers working up to solid foods. Drink plenty of nonalcoholic fluids.  6.  Certain anesthetics and pain medications produce nausea and vomiting in certain       individuals. If nausea becomes a problem at home, call you doctor.    7. A nurse will be calling you sometime after surgery. Do not be alarmed. This is our way of finding out how you are doing.    8. Several times every hour while you are awake, take 2-3 deep breaths and cough. If you had stomach surgery hold a pillow or rolled towel firmly against your stomach before you cough. This will help with any pain the cough might cause.  9. Several times every hour while you are awake, pump and flex your feet 5-6 times and do foot circles. This will help prevent blood clots.    10.Call your doctor for severe pain, bleeding, fever, or signs or symptoms of infection (pain, swelling, redness, foul odor, drainage).    Discharge Instructions: After Your Surgery/Procedure  Youve just had surgery. During surgery you were given medicine called anesthesia to keep you relaxed and free of pain. After surgery you may have some pain or nausea. This is common. Here are some tips for feeling better and getting well after surgery.     Stay on schedule with your medication.   Going home  Your doctor or nurse will show you how to take care of yourself when you go home. He or she will  "also answer your questions. Have an adult family member or friend drive you home.      For your safety we recommend these precaution for the first 24 hours after your procedure:  Do not drive or use heavy equipment.  Do not make important decisions or sign legal papers.  Do not drink alcohol.  Have someone stay with you, if needed. He or she can watch for problems and help keep you safe.  Your concentration, balance, coordination, and judgement may be impaired for many hours after anesthesia.  Use caution when ambulating or standing up.     You may feel weak and "washed out" after anesthesia and surgery.      Subtle residual effects of general anesthesia or sedation with regional / local anesthesia can last more than 24 hours.  Rest for the remainder of the day or longer if your Doctor/Surgeon has advised you to do so.  Although you may feel normal within the first 24 hours, your reflexes and mental ability may be impaired without you realizing it.  You may feel dizzy, lightheaded or sleepy for 24 hours or longer.      Be sure to go to all follow-up visits with your doctor. And rest after your surgery for as long as your doctor tells you to.  Coping with pain  If you have pain after surgery, pain medicine will help you feel better. Take it as told, before pain becomes severe. Also, ask your doctor or pharmacist about other ways to control pain. This might be with heat, ice, or relaxation. And follow any other instructions your surgeon or nurse gives you.  Tips for taking pain medicine  To get the best relief possible, remember these points:  Pain medicines can upset your stomach. Taking them with a little food may help.  Most pain relievers taken by mouth need at least 20 to 30 minutes to start to work.  Taking medicine on a schedule can help you remember to take it. Try to time your medicine so that you can take it before starting an activity. This might be before you get dressed, go for a walk, or sit down for " dinner.  Constipation is a common side effect of pain medicines. Call your doctor before taking any medicines such as laxatives or stool softeners to help ease constipation. Also ask if you should skip any foods. Drinking lots of fluids and eating foods such as fruits and vegetables that are high in fiber can also help. Remember, do not take laxatives unless your surgeon has prescribed them.  Drinking alcohol and taking pain medicine can cause dizziness and slow your breathing. It can even be deadly. Do not drink alcohol while taking pain medicine.  Pain medicine can make you react more slowly to things. Do not drive or run machinery while taking pain medicine.  Your health care provider may tell you to take acetaminophen to help ease your pain. Ask him or her how much you are supposed to take each day. Acetaminophen or other pain relievers may interact with your prescription medicines or other over-the-counter (OTC) drugs. Some prescription medicines have acetaminophen and other ingredients. Using both prescription and OTC acetaminophen for pain can cause you to overdose. Read the labels on your OTC medicines with care. This will help you to clearly know the list of ingredients, how much to take, and any warnings. It may also help you not take too much acetaminophen. If you have questions or do not understand the information, ask your pharmacist or health care provider to explain it to you before you take the OTC medicine.  Managing nausea  Some people have an upset stomach after surgery. This is often because of anesthesia, pain, or pain medicine, or the stress of surgery. These tips will help you handle nausea and eat healthy foods as you get better. If you were on a special food plan before surgery, ask your doctor if you should follow it while you get better. These tips may help:  Do not push yourself to eat. Your body will tell you when to eat and how much.  Start off with clear liquids and soup. They are  easier to digest.  Next try semi-solid foods, such as mashed potatoes, applesauce, and gelatin, as you feel ready.  Slowly move to solid foods. Dont eat fatty, rich, or spicy foods at first.  Do not force yourself to have 3 large meals a day. Instead eat smaller amounts more often.  Take pain medicines with a small amount of solid food, such as crackers or toast, to avoid nausea.     Call your surgeon if  You still have pain an hour after taking medicine. The medicine may not be strong enough.  You feel too sleepy, dizzy, or groggy. The medicine may be too strong.  You have side effects like nausea, vomiting, or skin changes, such as rash, itching, or hives.       If you have obstructive sleep apnea  You were given anesthesia medicine during surgery to keep you comfortable and free of pain. After surgery, you may have more apnea spells because of this medicine and other medicines you were given. The spells may last longer than usual.   At home:  Keep using the continuous positive airway pressure (CPAP) device when you sleep. Unless your health care provider tells you not to, use it when you sleep, day or night. CPAP is a common device used to treat obstructive sleep apnea.  Talk with your provider before taking any pain medicine, muscle relaxants, or sedatives. Your provider will tell you about the possible dangers of taking these medicines.  © 0329-1197 [employer]. [address] [place] [address]. All rights reserved. This information is not intended as a substitute for professional medical care. Always follow your healthcare professional's instructions.    Cystoscopy  Cystoscopy is a procedure that lets your doctor look directly inside your urethra and bladder. It can be used to:  Help diagnose a problem with your urethra, bladder, or kidneys.  Take a sample (biopsy) of bladder or urethral tissue.  Treat certain problems (such as removing kidney stones).  Place a stent to bypass an  obstruction.  Take special X-rays of the kidneys.  Based on the findings, your doctor may recommend other tests or treatments.  What is a cystoscope?  A cystoscope is a telescope-like instrument that contains lenses and fiberoptics (small glass wires that make bright light). The cystoscope may be straight and rigid, or flexible to bend around curves in the urethra. The doctor may look directly into the cystoscope, or project the image onto a monitor.  Getting ready  Ask your doctor if you should stop taking any medications prior to the procedure.  Ask whether you should avoid eating or drinking anything after midnight before the procedure.  Follow any other instructions your doctor gives you.  Tell your doctor before the exam if you:  Take any medications, such as aspirin or blood thinners  Have allergies to any medications  Are pregnant   The procedure  Cystoscopy is done in the doctors office or hospital. The doctor and a nurse are present during the procedure. It takes only a few minutes, longer if a biopsy, X-ray, or treatment needs to be done.  During the procedure:  You lie on an exam table on your back, knees bent and legs apart. You are covered with a drape.  Your urethra and the area around it are washed. Anesthetic jelly may be applied to numb the urethra. Other pain medication is usually not needed. In some cases, you may be offered a mild sedative to help you relax. If a more extensive procedure is to be done, such as a biopsy or kidney stone removal, general anesthesia may be needed.  The cystoscope is inserted. A sterile fluid is put into the bladder to expand it. You may feel pressure from this fluid.  When the procedure is done, the cystoscope is removed.  After the procedure  If you had a sedative, general anesthesia, or spinal anesthesia, you must have someone drive you home. Once youre home:  Drink plenty of fluids.  You may have burning or light bleeding when you urinate--this is  normal.  Medications may be prescribed to ease any discomfort or prevent infection. Take these as directed.  Call your doctor if you have heavy bleeding or blood clots, burning that lasts more than a day, a fever over 100°F  (38° C), or trouble urinating.  © 7826-8980 [employer]. [address] [place] [address]. All rights reserved. This information is not intended as a substitute for professional medical care. Always follow your healthcare professional's instructions.        Understanding Hydrodistention with Cystoscopy    Hydrodistention is a procedure that fills up your bladder with water. It is used to help find out what may be causing your bladder pain. During the procedure a long, thin tube (cystoscope) is used. It has a lens and a light on one end. This tube helps your healthcare provider see inside your bladder. It is put into your bladder through your urethra. Thats the tube that passes urine out of your body.  How to say it  [id number]   Why hydrodistention with cystoscopy is done  This procedure is often done to figure out the cause of bladder pain. It may help diagnose bladder pain syndrome (BPS), also called interstitial cystitis (IC). If you have this health problem, you may feel pain when your bladder fills with urine. You may also need to pass urine more often.  This procedure is also sometimes done to treat BPS. It may be tried if other treatments, such as medicine, dont work.  How hydrodistention with cystoscopy is done  This may be done in a hospital or at an outpatient facility. During the procedure:  · You are given medicine so you wont feel any pain. It may also make you fall asleep.  · Your healthcare provider puts the cystoscope into your urethra. He or she gently moves it forward into your bladder.  · Using the cystoscope, he or she slowly fills up your bladder with as much water as possible. This helps find out how much fluid your  bladder can hold.   · Your bladder is then drained and filled again.  · Your healthcare provider may look at your bladder lining with the cystoscope. He or she will see if there are any sores or other possible causes for your symptoms.  · Your bladder is drained.  Risks of hydrodistention with cystoscopy  · Infection  · Symptoms get worse  © 2853-6267 [employer]. [address] [place] [address], [place] [address]. All rights reserved. This information is not intended as a substitute for professional medical care. Always follow your healthcare professional's instructions.

## 2017-01-23 NOTE — PLAN OF CARE
Problem: Patient Care Overview  Goal: Plan of Care Review  Outcome: Ongoing (interventions implemented as appropriate)  Reviewed plan of care with spouse

## 2017-01-23 NOTE — PLAN OF CARE
Patient tolerated procedure well, transferred to recovery via stretcher; report given to PACU.CP, RN

## 2017-01-23 NOTE — ANESTHESIA POSTPROCEDURE EVALUATION
"Anesthesia Post Evaluation    Patient: Gera Doran    Procedure(s) Performed: Procedure(s) (LRB):  CYSTOSCOPY WITH HYDRODISTENSION (N/A)    Final Anesthesia Type: general  Patient location during evaluation: PACU  Patient participation: Yes- Able to Participate  Level of consciousness: awake and alert and oriented  Post-procedure vital signs: reviewed and stable  Pain management: adequate  Airway patency: patent  PONV status at discharge: No PONV  Anesthetic complications: no      Cardiovascular status: blood pressure returned to baseline  Respiratory status: unassisted, spontaneous ventilation and room air  Hydration status: euvolemic  Follow-up not needed.        Visit Vitals    /80 (BP Location: Left arm, Patient Position: Lying, BP Method: Automatic)    Pulse 63    Temp 36.3 °C (97.3 °F) (Oral)    Resp 16    Ht 5' 11" (1.803 m)    Wt 90.7 kg (200 lb)    SpO2 97%    BMI 27.89 kg/m2       Pain/Pipo Score: Pain Assessment Performed: Yes (1/23/2017  7:43 AM)  Presence of Pain: denies (1/23/2017  7:43 AM)  Pipo Score: 10 (1/23/2017  7:43 AM)      "

## 2017-01-23 NOTE — ANESTHESIA PREPROCEDURE EVALUATION
01/23/2017  Gera Doran is a 57 y.o., male.    [facility] Anesthesia Evaluation    I have reviewed the Patient Summary Reports.    I have reviewed the Nursing Notes.   I have reviewed the Medications.     Review of Systems  Anesthesia Hx:  No problems with previous Anesthesia    Social:  Non-Smoker    Pulmonary:   Asthma asymptomatic Sleep Apnea    Hepatic/GI:   GERD, well controlled        Physical Exam  General:  Well nourished    Airway/Jaw/Neck:  Airway Findings: Mouth Opening: Normal Tongue: Normal  General Airway Assessment: Adult, Good  Mallampati: II  TM Distance: Normal, at least 6 cm       Chest/Lungs:  Chest/Lungs Findings: Clear to auscultation, Normal Respiratory Rate     Heart/Vascular:  Heart Findings: Rate: Normal  Rhythm: Regular Rhythm        Mental Status:  Mental Status Findings:  Alert and Oriented, Cooperative         Anesthesia Plan  Type of Anesthesia, risks & benefits discussed:  Anesthesia Type:  general  Patient's Preference:   Intra-op Monitoring Plan:   Intra-op Monitoring Plan Comments:   Post Op Pain Control Plan:   Post Op Pain Control Plan Comments:   Induction:   IV  Beta Blocker:  Patient is not currently on a Beta-Blocker (No further documentation required).       Informed Consent: Patient understands risks and agrees with Anesthesia plan.  Questions answered. Anesthesia consent signed with patient.  ASA Score: 2     Day of Surgery Review of History & Physical: I have interviewed and examined the patient. I have reviewed the patient's H&P dated:  There are no significant changes.          Ready For Surgery From Anesthesia Perspective.

## 2017-01-24 VITALS
TEMPERATURE: 97 F | RESPIRATION RATE: 16 BRPM | HEART RATE: 68 BPM | BODY MASS INDEX: 28 KG/M2 | WEIGHT: 200 LBS | OXYGEN SATURATION: 100 % | HEIGHT: 71 IN | DIASTOLIC BLOOD PRESSURE: 77 MMHG | SYSTOLIC BLOOD PRESSURE: 138 MMHG

## 2017-01-26 ENCOUNTER — TELEPHONE (OUTPATIENT)
Dept: HEMATOLOGY/ONCOLOGY | Facility: CLINIC | Age: 57
End: 2017-01-26

## 2017-01-26 NOTE — TELEPHONE ENCOUNTER
Received call from pt and scheduled him an appt with Dr. Simon for 1/27/17 @ [id number].  Pt verbalizes understanding and appreciation.

## 2017-01-26 NOTE — TELEPHONE ENCOUNTER
Called pt and left him a message asking him to please return my call at [phone] so we can schedule him an appt with Dr. Simon as requested by Dr. De La Cruz for an abnormal blood smear.  Awaiting pts call back.

## 2017-01-27 ENCOUNTER — OFFICE VISIT (OUTPATIENT)
Dept: HEMATOLOGY/ONCOLOGY | Facility: CLINIC | Age: 57
End: 2017-01-27
Payer: MEDICARE

## 2017-01-27 VITALS
DIASTOLIC BLOOD PRESSURE: 83 MMHG | HEART RATE: 62 BPM | SYSTOLIC BLOOD PRESSURE: 158 MMHG | BODY MASS INDEX: 27.56 KG/M2 | HEIGHT: 72 IN | WEIGHT: 203.5 LBS | OXYGEN SATURATION: 97 % | TEMPERATURE: 98 F | RESPIRATION RATE: 20 BRPM

## 2017-01-27 DIAGNOSIS — R79.9 ABNORMAL BLOOD SMEAR: Primary | ICD-10-CM

## 2017-01-27 DIAGNOSIS — D72.829 LEUKOCYTOSIS, UNSPECIFIED TYPE: ICD-10-CM

## 2017-01-27 DIAGNOSIS — D72.820 LYMPHOCYTOSIS: ICD-10-CM

## 2017-01-27 DIAGNOSIS — Z77.098 CHEMICAL EXPOSURE: ICD-10-CM

## 2017-01-27 PROCEDURE — 99213 OFFICE O/P EST LOW 20 MIN: CPT | Mod: PBBFAC,PO | Performed by: INTERNAL MEDICINE

## 2017-01-27 PROCEDURE — 99999 PR PBB SHADOW E&M-EST. PATIENT-LVL III: CPT | Mod: PBBFAC,,, | Performed by: INTERNAL MEDICINE

## 2017-01-27 PROCEDURE — 99205 OFFICE O/P NEW HI 60 MIN: CPT | Mod: S$PBB,,, | Performed by: INTERNAL MEDICINE

## 2017-01-27 NOTE — PROGRESS NOTES
Pt here for evaluation of leukocytosis and basophilia with smudge cells on smear      Gera Doran is a 57 y.o.    Visit 57-year-old gentleman was found to have smudge cells on a peripheral smear which was noticed upon ordering a routine follow-up CBC.  Patient has had no fever no night sweats and no weight loss.  He does not have a history of frequent infections.  He does have a history of chemical exposure    His white count at the time of this test was 17.1 with a hemoglobin of 14.1 and a , 202 with 60% lymphocytes.  In 1999 he states he was exposed to multiple chemicals.  He is concerned with a lymph node that he feels in his neck.  He also has been diagnosed with actinic keratosis involving his hand and his nose and he has seen dermatology for such.  His a history of interstitial cystitis with microhematuria.  Currently he reports no symptoms from such  Past Medical History   Diagnosis Date    Arthritis     Asthma      Chemical exposure    Chemical exposure      TO LUNG    GERD (gastroesophageal reflux disease)     IC (interstitial cystitis)     RLS (restless legs syndrome)     Sleep apnea      NO MACHINE     Past Surgical History   Procedure Laterality Date    Appendectomy      Tonsils      Hernia repair      Sinus surgery      Stomach surgery for gerd      Tonsillectomy         Current Outpatient Prescriptions:     aspirin-salicylamide-caffeine (BC HEADACHE POWDER) 650-195-32 mg Pack, Take 1 packet by mouth once daily., Disp: , Rfl:     ciprofloxacin HCl (CIPRO) 500 MG tablet, Take 1 tablet (500 mg total) by mouth 2 (two) times daily., Disp: 20 tablet, Rfl: 0    COMBIVENT RESPIMAT  mcg/actuation inhaler, , Disp: , Rfl:     fluticasone (FLONASE) 50 mcg/actuation nasal spray, 1 spray by Nasal route as needed. , Disp: , Rfl:     hydrocodone-acetaminophen 5-325mg (NORCO) 5-325 mg per tablet, Take 1 tablet by mouth every 4 to 6 hours as needed for Pain., Disp: 20 tablet, Rfl:  "0    omeprazole (PRILOSEC) 20 MG capsule, Take 20 mg by mouth once daily., Disp: , Rfl:     sertraline (ZOLOFT) 100 MG tablet, Take 100 mg by mouth once daily. 1/2 daily, Disp: , Rfl:     [DISCONTINUED] alfuzosin (UROXATRAL) 10 mg Tb24, 10 mg daily with breakfast. , Disp: , Rfl:   Review of patient's allergies indicates:   Allergen Reactions    Imitrex [sumatriptan succinate] Itching    Sulfa (sulfonamide antibiotics) Itching    Amoxicillin Itching     Social History   Substance Use Topics    Smoking status: Never Smoker    Smokeless tobacco: Never Used    Alcohol use Yes      Comment: OCC     No family history on file.    CONSTITUTIONAL: No fevers, chills, night sweats, wt. loss, appetite changes  SKIN: no rashes or itching  ENT: No headaches, head trauma, vision changes, or eye pain  LYMPH NODES: None noticed   CV: No chest pain, palpitations.   RESP: No dyspnea on exertion, cough, wheezing, or hemoptysis  GI: No nausea, emesis, diarrhea, constipation, melena, hematochezia, pain.   : No dysuria, hematuria, urgency, or frequency   HEME: No easy bruising, bleeding problems  PSYCHIATRIC: No depression, anxiety, psychosis, hallucinations.  NEURO: No seizures, memory loss, dizziness or difficulty sleeping  MSK: No arthralgias or joint swelling         Visit Vitals    BP (!) 158/83 (BP Location: Left arm, Patient Position: Sitting, BP Method: Automatic)    Pulse 62    Temp 97.6 °F (36.4 °C) (Oral)    Resp 20    Ht 5' 11.5" (1.816 m)    Wt 92.3 kg (203 lb 7.8 oz)    SpO2 97%    BMI 27.98 kg/m2     Gen: NAD, A and O x3,   Psych: pleasant affect, normal thought process  Eyes: Pupils round and non dilated, EOM intact  Nose: Nares patent  OP clear, mucosa patent  Neck: suppple, no JVD, trachea midline, no palpable mass, no adenopathy  Lungs: CTAB, no wheezes, no use of accessory muscles  CV: S1S2 with RRR, No mrg  Abd: soft, NTND, + BS, No HSM, no ascites  Extr: No CCE, RADHA, strength normal, good " capillary refill  Neuro: steady gait, CNs grossly intact  Skin: intact, no lesions noted  Rheum: No joint swelling    Lab Results   Component Value Date    WBC 12.00 01/23/2017    HGB 12.9 (L) 01/23/2017    HCT 39.2 (L) 01/23/2017    MCV 93 01/23/2017     01/23/2017     CMP  Sodium   Date Value Ref Range Status   01/18/2017 142 136 - 145 mmol/L Final     Potassium   Date Value Ref Range Status   01/18/2017 4.1 3.5 - 5.1 mmol/L Final     Chloride   Date Value Ref Range Status   01/18/2017 108 95 - 110 mmol/L Final     CO2   Date Value Ref Range Status   01/18/2017 25 23 - 29 mmol/L Final     Glucose   Date Value Ref Range Status   01/18/2017 102 70 - 110 mg/dL Final     BUN, Bld   Date Value Ref Range Status   01/18/2017 15 6 - 20 mg/dL Final     Creatinine   Date Value Ref Range Status   01/18/2017 1.2 0.5 - 1.4 mg/dL Final     Calcium   Date Value Ref Range Status   01/18/2017 8.8 8.7 - 10.5 mg/dL Final     Total Protein   Date Value Ref Range Status   01/18/2017 6.2 6.0 - 8.4 g/dL Final     Albumin   Date Value Ref Range Status   01/18/2017 3.5 3.5 - 5.2 g/dL Final     Total Bilirubin   Date Value Ref Range Status   01/18/2017 0.3 0.1 - 1.0 mg/dL Final     Comment:     For infants and newborns, interpretation of results should be based  on gestational age, weight and in agreement with clinical  observations.  Premature Infant recommended reference ranges:  Up to 24 hours.............<8.0 mg/dL  Up to 48 hours............<12.0 mg/dL  3-5 days..................<15.0 mg/dL  6-29 days.................<15.0 mg/dL       Alkaline Phosphatase   Date Value Ref Range Status   01/18/2017 62 55 - 135 U/L Final     AST   Date Value Ref Range Status   01/18/2017 21 10 - 40 U/L Final     ALT   Date Value Ref Range Status   01/18/2017 17 10 - 44 U/L Final     Anion Gap   Date Value Ref Range Status   01/18/2017 9 8 - 16 mmol/L Final     eGFR if    Date Value Ref Range Status   01/18/2017 >60 >60 mL/min/1.73  m^2 Final     eGFR if non    Date Value Ref Range Status   01/18/2017 >60 >60 mL/min/1.73 m^2 Final     Comment:     Calculation used to obtain the estimated glomerular filtration  rate (eGFR) is the CKD-EPI equation. Since race is unknown   in our information system, the eGFR values for   -American and Non--American patients are given   for each creatinine result.         Abnormal blood smear    Leukocytosis, unspecified type    Lymphocytosis    Chemical exposure      Patient understands the differential diagnosis of lymphocytosis with smudge cells seen on peripheral smear.  Of course chronic lymphocytic leukemia is  in the differential.  He'll be referred for further blood work including flow cytometry.  If this indeed confirms my suspicion that he'll undergo CT scan of neck chest abdomen and pelvis to look for bulky adenopathy.  Currently he would not meet criteria for treatment given his blood counts alone since he is not exhibiting any cytopenias at this time to indicate bone marrow failure.  I explained the palpable lymph node in the cervical region is completely benign and does not meet criteria for pathological node  Once I have the above tests to make further recommendations to the patient back for follow-up    I tried to reassure the patient that chronic leukemias completely different than acute leukemia and I hope I relieve his worry    Thank you for allowing me to evaluate and participate in the care of this pleasant patient. Please fell free to call me with any questions or concerns.    [place]arm[place],  Leilani Simon MD    This note was dictated with Dragon and briefly proofread. Please excuse any sentences that may be unclear or words misspelled

## 2017-01-27 NOTE — MR AVS SNAPSHOT
[facility]  [address] [place] [address] Drive [address]  [facility] [address]  Phone: [phone]                  Gera Doran   2017 1:20 PM   Office Visit    Description:  Male : 1960   Provider:  Leilani Simon MD   Department:  [facility]           Reason for Visit     other                To Do List           Future Appointments        Provider Department Dept Phone    2/10/2017 9:30 AM Damian De La Cruz MD [place] [facility] [phone]      Goals (5 Years of Data)     None      [facility]     [facility] -  Assistance  Registered nurses in the [facility] On Call Center provide clinical advisement, health education, appointment booking, and other advisory services.  Call for this free service at [phone].             Medications           Message regarding Medications     Verify the changes and/or additions to your medication regime listed below are the same as discussed with your clinician today.  If any of these changes or additions are incorrect, please notify your healthcare provider.        STOP taking these medications     COMBIVENT  mcg/actuation inhaler Inhale 2 puffs into the lungs every 6 (six) hours as needed.     phenazopyridine (PYRIDIUM) 100 MG tablet Take 2 tablets (200 mg total) by mouth 3 (three) times daily with meals.           Verify that the below list of medications is an accurate representation of the medications you are currently taking.  If none reported, the list may be blank. If incorrect, please contact your healthcare provider. Carry this list with you in case of emergency.           Current Medications     aspirin-salicylamide-caffeine (BC HEADACHE POWDER) 650-195-32 mg Pack Take 1 packet by mouth once daily.    ciprofloxacin HCl (CIPRO) 500 MG tablet Take 1 tablet (500 mg total) by mouth 2 (two) times daily.    COMBIVENT RESPIMAT  mcg/actuation inhaler     fluticasone  "(FLONASE) 50 mcg/actuation nasal spray 1 spray by Nasal route as needed.     hydrocodone-acetaminophen 5-325mg (NORCO) 5-325 mg per tablet Take 1 tablet by mouth every 4 to 6 hours as needed for Pain.    omeprazole (PRILOSEC) 20 MG capsule Take 20 mg by mouth once daily.    sertraline (ZOLOFT) 100 MG tablet Take 100 mg by mouth once daily. 1/2 daily           Clinical Reference Information           Vital Signs - Last Recorded  Most recent update: 1/27/2017  1:40 PM by Serina Cornejo LPN    BP Pulse Temp Resp    (!) 158/83 (BP Location: Left arm, Patient Position: Sitting, BP Method: Automatic) 62 97.6 °F (36.4 °C) (Oral) 20    Ht Wt SpO2 BMI    5' 11.5" (1.816 m) 92.3 kg (203 lb 7.8 oz) 97% 27.98 kg/m2      Blood Pressure          Most Recent Value    BP  (!)  158/83      Allergies as of 1/27/2017     Imitrex [Sumatriptan Succinate]    Sulfa (Sulfonamide Antibiotics)    Amoxicillin      Immunizations Administered on Date of Encounter - 1/27/2017     None      "

## 2017-02-06 ENCOUNTER — PATIENT MESSAGE (OUTPATIENT)
Dept: HEMATOLOGY/ONCOLOGY | Facility: CLINIC | Age: 57
End: 2017-02-06

## 2017-02-07 ENCOUNTER — TELEPHONE (OUTPATIENT)
Dept: HEMATOLOGY/ONCOLOGY | Facility: CLINIC | Age: 57
End: 2017-02-07

## 2017-02-07 DIAGNOSIS — C91.10 CLL (CHRONIC LYMPHOCYTIC LEUKEMIA): Primary | ICD-10-CM

## 2017-02-07 NOTE — TELEPHONE ENCOUNTER
----- Message from Marce Ritter sent at 2/6/2017  3:13 PM CST -----  Contact: Patient  Patient called requesting a call back regarding lab results. Please call back at [phone]. Thanks,

## 2017-02-07 NOTE — TELEPHONE ENCOUNTER
----- Message from Marce Ritter sent at 2/6/2017  3:13 PM CST -----  Contact: Patient  Patient called requesting a call back regarding lab results. Please call back at [phone]. Thanks,

## 2017-02-08 DIAGNOSIS — C91.10 CLL (CHRONIC LYMPHOCYTIC LEUKEMIA): Primary | ICD-10-CM

## 2017-02-09 ENCOUNTER — HOSPITAL ENCOUNTER (OUTPATIENT)
Dept: RADIOLOGY | Facility: HOSPITAL | Age: 57
Discharge: HOME | End: 2017-02-09
Attending: INTERNAL MEDICINE
Payer: MEDICARE

## 2017-02-09 DIAGNOSIS — C91.10 CLL (CHRONIC LYMPHOCYTIC LEUKEMIA): ICD-10-CM

## 2017-02-09 PROCEDURE — 71260 CT THORAX DX C+: CPT | Mod: 26,,, | Performed by: RADIOLOGY

## 2017-02-09 PROCEDURE — 70491 CT SOFT TISSUE NECK W/DYE: CPT | Mod: 26,,, | Performed by: RADIOLOGY

## 2017-02-09 PROCEDURE — 25500020 PHARM REV CODE 255

## 2017-02-09 PROCEDURE — 74177 CT ABD & PELVIS W/CONTRAST: CPT | Mod: 26,,, | Performed by: RADIOLOGY

## 2017-02-09 PROCEDURE — 74177 CT ABD & PELVIS W/CONTRAST: CPT | Mod: TC

## 2017-02-09 PROCEDURE — 70491 CT SOFT TISSUE NECK W/DYE: CPT | Mod: TC

## 2017-02-09 PROCEDURE — 71260 CT THORAX DX C+: CPT | Mod: TC

## 2017-02-09 RX ADMIN — IOHEXOL 100 ML: 350 INJECTION, SOLUTION INTRAVENOUS at 07:02

## 2017-02-09 RX ADMIN — IOHEXOL 30 ML: 350 INJECTION, SOLUTION INTRAVENOUS at 07:02

## 2017-02-10 ENCOUNTER — OFFICE VISIT (OUTPATIENT)
Dept: UROLOGY | Facility: CLINIC | Age: 57
End: 2017-02-10
Payer: MEDICARE

## 2017-02-10 VITALS — HEART RATE: 67 BPM | TEMPERATURE: 98 F | SYSTOLIC BLOOD PRESSURE: 141 MMHG | DIASTOLIC BLOOD PRESSURE: 87 MMHG

## 2017-02-10 DIAGNOSIS — N30.10 IC (INTERSTITIAL CYSTITIS): ICD-10-CM

## 2017-02-10 DIAGNOSIS — R31.29 MICROSCOPIC HEMATURIA: ICD-10-CM

## 2017-02-10 DIAGNOSIS — N28.1 RENAL CYST: Primary | ICD-10-CM

## 2017-02-10 DIAGNOSIS — C91.10 CLL (CHRONIC LYMPHOCYTIC LEUKEMIA): ICD-10-CM

## 2017-02-10 LAB
BILIRUB SERPL-MCNC: ABNORMAL MG/DL
BLOOD URINE, POC: ABNORMAL
COLOR, POC UA: YELLOW
GLUCOSE UR QL STRIP: ABNORMAL
KETONES UR QL STRIP: ABNORMAL
LEUKOCYTE ESTERASE URINE, POC: ABNORMAL
NITRITE, POC UA: ABNORMAL
PH, POC UA: 8
PROTEIN, POC: ABNORMAL
SPECIFIC GRAVITY, POC UA: 1.01
UROBILINOGEN, POC UA: ABNORMAL

## 2017-02-10 PROCEDURE — 81002 URINALYSIS NONAUTO W/O SCOPE: CPT | Mod: S$GLB,,, | Performed by: UROLOGY

## 2017-02-10 PROCEDURE — 99213 OFFICE O/P EST LOW 20 MIN: CPT | Mod: 25,S$GLB,, | Performed by: UROLOGY

## 2017-02-10 NOTE — PROGRESS NOTES
CHIEF COMPLAINT:  Interstitial cystitis and recent diagnosis of leukemia.    HISTORY OF PRESENT ILLNESS:  This 57-year-old male returns for routine recheck.    He had undergone cystoscopy and bladder hydrodistention under general   anesthesia for treatment of his condition of interstitial cystitis, since then   he has noticed some improvement, but still having some frequency and nocturia,   but does also admit to increased p.o. fluid intake, overall he is doing quite   well in the daytime.  In his preop lab work, patient was noted to have an   elevated WBC of 17.1.  It was repeated and it came back down to 12, but   peripheral blood smear reveals significant abnormality that appeared to be   consistent with chronic lymphocytic leukemia.  The patient was referred to Dr. Simon, hematologist/oncologist.  He recently underwent a CT scan of the abdomen,   pelvis, chest and soft tissues in neck and there was evidence of cervical   lymphadenopathy and also mediastinal lymphadenopathies.  There was also a   questionable lesion in the right kidney for which ultrasound was recommended.    The patient has an followup appointment with Dr. Simon next week to discuss the   treatment plan.    His last PSA was 3.3 on 11/10/2016.    FINAL IMPRESSION:  Interstitial cystitis and chronic lymphocytic leukemia,   recently diagnosed.    RECOMMENDATIONS:  Continue with observation and current management related to   interstitial cystitis.  The patient was advised to decrease his fluid intake in   the evenings and he will otherwise follow up with Dr. Simon concerning further   management of leukemia with recheck with us in four weeks. Renal ultrasound.      MD/IN  dd: 02/10/2017 10:21:46 (CST)  td: 02/11/2017 04:41:22 (CST)  Doc ID   #[id number]  Job ID #[id number]    CC:

## 2017-02-15 ENCOUNTER — HOSPITAL ENCOUNTER (OUTPATIENT)
Dept: RADIOLOGY | Facility: HOSPITAL | Age: 57
Discharge: HOME | End: 2017-02-15
Attending: UROLOGY
Payer: MEDICARE

## 2017-02-15 ENCOUNTER — TELEPHONE (OUTPATIENT)
Dept: UROLOGY | Facility: CLINIC | Age: 57
End: 2017-02-15

## 2017-02-15 ENCOUNTER — OFFICE VISIT (OUTPATIENT)
Dept: HEMATOLOGY/ONCOLOGY | Facility: CLINIC | Age: 57
End: 2017-02-15
Payer: MEDICARE

## 2017-02-15 ENCOUNTER — TELEPHONE (OUTPATIENT)
Dept: NEUROSURGERY | Facility: CLINIC | Age: 57
End: 2017-02-15

## 2017-02-15 VITALS
HEIGHT: 71 IN | TEMPERATURE: 98 F | SYSTOLIC BLOOD PRESSURE: 143 MMHG | HEART RATE: 57 BPM | BODY MASS INDEX: 28.64 KG/M2 | RESPIRATION RATE: 18 BRPM | WEIGHT: 204.56 LBS | DIASTOLIC BLOOD PRESSURE: 85 MMHG

## 2017-02-15 DIAGNOSIS — C91.10 CLL (CHRONIC LYMPHOCYTIC LEUKEMIA): Primary | ICD-10-CM

## 2017-02-15 DIAGNOSIS — N28.1 RENAL CYST: ICD-10-CM

## 2017-02-15 DIAGNOSIS — N30.10 IC (INTERSTITIAL CYSTITIS): ICD-10-CM

## 2017-02-15 DIAGNOSIS — D63.8 ANEMIA, CHRONIC DISEASE: ICD-10-CM

## 2017-02-15 PROCEDURE — 76770 US EXAM ABDO BACK WALL COMP: CPT | Mod: 26,,, | Performed by: RADIOLOGY

## 2017-02-15 PROCEDURE — 99999 PR PBB SHADOW E&M-EST. PATIENT-LVL III: CPT | Mod: PBBFAC,,, | Performed by: INTERNAL MEDICINE

## 2017-02-15 PROCEDURE — 99215 OFFICE O/P EST HI 40 MIN: CPT | Mod: S$PBB,,, | Performed by: INTERNAL MEDICINE

## 2017-02-15 PROCEDURE — 76770 US EXAM ABDO BACK WALL COMP: CPT | Mod: TC

## 2017-02-15 PROCEDURE — 99213 OFFICE O/P EST LOW 20 MIN: CPT | Mod: PBBFAC,PO | Performed by: INTERNAL MEDICINE

## 2017-02-15 NOTE — TELEPHONE ENCOUNTER
----- Message from Serina Cornejo LPN sent at 2/15/2017 11:12 AM CST -----  Hi.  This pt just saw Dr. Simon today and she would like to know if say could please move his appt up to a sooner date.  As of now he is scheduled for 3/10/17 with Dr. De La Cruz.  Thanks, April

## 2017-02-15 NOTE — TELEPHONE ENCOUNTER
----- Message from Serina Cornejo LPN sent at 2/15/2017 11:14 AM CST -----  Cecile left out in my previous message that the reason Dr. Simon wants his appt with Dr. De La Cruz moved up sooner is due to the u/s results that Dr. De aL Cruz ordered.  Thanks, April

## 2017-02-15 NOTE — TELEPHONE ENCOUNTER
Called pt to schedule appt with Rupa Allison NP. Appt date, time and location given. Pt verbalized understanding.     ----- Message from Serina Cornejo LPN sent at 2/15/2017 11:26 AM CST -----  Hi.  Dr. Simon is requesting this pt be seen by neurosurgery please for vert back pain.  Thanks, April

## 2017-02-15 NOTE — MR AVS SNAPSHOT
[facility]  [address] [place] [address] Drive [address]  [facility] [address]  Phone: [phone]                  Gera Doran   2/15/2017 10:20 AM   Office Visit    Description:  Male : 1960   Provider:  Leilani Simon MD   Department:  [facility]           Diagnoses this Visit        Comments    CLL (chronic lymphocytic leukemia)    -  Primary     Anemia, chronic disease         IC (interstitial cystitis)                To Do List           Future Appointments        Provider Department Dept Phone    3/10/2017 9:45 AM Damian De La Cruz MD [place] [facility] [phone]    2017 9:00 AM [facility] [phone]    8/15/2017 9:00 AM Leilani Simon MD [facility] [phone]      Goals (5 Years of Data)     None      Follow-Up and Disposition     Return in about 6 months (around 8/15/2017).    Follow-up and Disposition History      [facility] On Call     [facility] Nurse Care [facility] - 24/7 Assistance  Registered nurses in the [facility] Center provide clinical advisement, health education, appointment booking, and other advisory services.  Call for this free service at [phone].             Medications           Message regarding Medications     Verify the changes and/or additions to your medication regime listed below are the same as discussed with your clinician today.  If any of these changes or additions are incorrect, please notify your healthcare provider.             Verify that the below list of medications is an accurate representation of the medications you are currently taking.  If none reported, the list may be blank. If incorrect, please contact your healthcare provider. Carry this list with you in case of emergency.           Current Medications     aspirin-salicylamide-caffeine (BC HEADACHE POWDER) 650-195-32 mg Pack Take 1 packet by mouth once daily.    COMBIVENT RESPIMAT  " mcg/actuation inhaler     fluticasone (FLONASE) 50 mcg/actuation nasal spray 1 spray by Nasal route as needed.     omeprazole (PRILOSEC) 20 MG capsule Take 20 mg by mouth once daily.    sertraline (ZOLOFT) 100 MG tablet Take 100 mg by mouth once daily. 1/2 daily           Clinical Reference Information           Your Vitals Were     BP Pulse Temp Resp Height Weight    143/85 (BP Location: Left arm, Patient Position: Sitting, BP Method: Automatic) 57 97.7 °F (36.5 °C) (Oral) 18 5' 11" (1.803 m) 92.8 kg (204 lb 9.4 oz)    BMI                28.53 kg/m2          Blood Pressure          Most Recent Value    BP  (!)  143/85      Allergies as of 2/15/2017     Imitrex [Sumatriptan Succinate]    Sulfa (Sulfonamide Antibiotics)    Amoxicillin      Immunizations Administered on Date of Encounter - 2/15/2017     None      Orders Placed During Today's Visit     Future Labs/Procedures Expected by Expires    CBC auto differential  2/15/2017 4/16/2018    FREE LT CHAIN ANAL  2/15/2017 4/16/2018      Language Assistance Services     ATTENTION: Language assistance services are available, free of charge. Please call [phone].      ATENCIÓN: Si habla izzyañol, tiene a mckeon disposición servicios gratuitos de asistencia lingüística. Llame al [phone].     [facility] Ý: N?u b?n nói Ti?ng Vi?t, có các d?ch v? h? tr? ngôn ng? mi?n phí dành cho b?n. G?i s? [phone].         [facility] complies with applicable Federal civil rights laws and does not discriminate on the basis of race, color, national origin, age, disability, or sex.        "

## 2017-02-15 NOTE — PROGRESS NOTES
Pt here for evaluation of leukocytosis and basophilia with smudge cells on smear      Gera Doran is a 57 y.o.    Visit 57-year-old gentleman was found to have smudge cells on a peripheral smear which was noticed upon ordering a routine follow-up CBC.  Patient has had no fever no night sweats and no weight loss.  He does not have a history of frequent infections.  He does have a history of chemical exposure    His white count at the time of this test was 17.1 with a hemoglobin of 14.1 and a , 202 with 60% lymphocytes.  In 1999 he states he was exposed to multiple chemicals.  He is concerned with a lymph node that he feels in his neck.  He also has been diagnosed with actinic keratosis involving his hand and his nose and he has seen dermatology for such.  His a history of interstitial cystitis with microhematuria.  Currently he reports no symptoms from such  Past Medical History   Diagnosis Date    Arthritis     Asthma      Chemical exposure    Chemical exposure      TO LUNG    GERD (gastroesophageal reflux disease)     IC (interstitial cystitis)     Leukemia     RLS (restless legs syndrome)     Sleep apnea      NO MACHINE     Past Surgical History   Procedure Laterality Date    Appendectomy      Tonsils      Hernia repair      Sinus surgery      Stomach surgery for gerd      Tonsillectomy      Cystoscopy         Current Outpatient Prescriptions:     aspirin-salicylamide-caffeine (BC HEADACHE POWDER) 650-195-32 mg Pack, Take 1 packet by mouth once daily., Disp: , Rfl:     COMBIVENT RESPIMAT  mcg/actuation inhaler, , Disp: , Rfl:     fluticasone (FLONASE) 50 mcg/actuation nasal spray, 1 spray by Nasal route as needed. , Disp: , Rfl:     omeprazole (PRILOSEC) 20 MG capsule, Take 20 mg by mouth once daily., Disp: , Rfl:     sertraline (ZOLOFT) 100 MG tablet, Take 100 mg by mouth once daily. 1/2 daily, Disp: , Rfl:     [DISCONTINUED] alfuzosin (UROXATRAL) 10 mg Tb24, 10 mg daily  with breakfast. , Disp: , Rfl:   Review of patient's allergies indicates:   Allergen Reactions    Imitrex [sumatriptan succinate] Itching    Sulfa (sulfonamide antibiotics) Itching    Amoxicillin Itching     Social History   Substance Use Topics    Smoking status: Never Smoker    Smokeless tobacco: Never Used    Alcohol use Yes      Comment: OCC     No family history on file.    CONSTITUTIONAL: No fevers, chills, night sweats, wt. loss, appetite changes  SKIN: no rashes or itching  ENT: No headaches, head trauma, vision changes, or eye pain  LYMPH NODES: None noticed   CV: No chest pain, palpitations.   RESP: No dyspnea on exertion, cough, wheezing, or hemoptysis  GI: No nausea, emesis, diarrhea, constipation, melena, hematochezia, pain.   : No dysuria, hematuria, urgency, or frequency   HEME: No easy bruising, bleeding problems  PSYCHIATRIC: No depression, anxiety, psychosis, hallucinations.  NEURO: No seizures, memory loss, dizziness or difficulty sleeping  MSK: No arthralgias or joint swelling         There were no vitals taken for this visit.  Gen: NAD, A and O x3,   Psych: pleasant affect, normal thought process  Eyes: Pupils round and non dilated, EOM intact  Nose: Nares patent  OP clear, mucosa patent  Neck: suppple, no JVD, trachea midline, no palpable mass, no adenopathy  Lungs: CTAB, no wheezes, no use of accessory muscles  CV: S1S2 with RRR, No mrg  Abd: soft, NTND, + BS, No HSM, no ascites  Extr: No CCE, RADHA, strength normal, good capillary refill  Neuro: steady gait, CNs grossly intact  Skin: intact, no lesions noted  Rheum: No joint swelling      Pathologist Review Peripheral Smear REVIEWED   Comments: Electronically reviewed and signed by:   Regi Yang M.D.   Signed on 01/23/17 at 13:41   PATHOLOGIST INTERPRETATION   WBC- Leukocytosis with an absolute lymphocytosis.  Lymphocytes are   mature and slightly atypical.  Smudge cells are increased.  The   morphology is suggestive of CLL.   Recommend flow cytometry of   peripheral blood ([id number])for confirmation.   RBC- Mild normocytic anemia without significant morphologic   abnormalities.   PLT- Normal in number and morphology.   Interpreted by Regi Yang M.D.      [facility]             Lab Results   Component Value Date    WBC 12.00 01/23/2017    HGB 12.9 (L) 01/23/2017    HCT 39.2 (L) 01/23/2017    MCV 93 01/23/2017     01/23/2017     CMP  Sodium   Date Value Ref Range Status   01/18/2017 142 136 - 145 mmol/L Final     Potassium   Date Value Ref Range Status   01/18/2017 4.1 3.5 - 5.1 mmol/L Final     Chloride   Date Value Ref Range Status   01/18/2017 108 95 - 110 mmol/L Final     CO2   Date Value Ref Range Status   01/18/2017 25 23 - 29 mmol/L Final     Glucose   Date Value Ref Range Status   01/18/2017 102 70 - 110 mg/dL Final     BUN, Bld   Date Value Ref Range Status   01/18/2017 15 6 - 20 mg/dL Final     Creatinine   Date Value Ref Range Status   01/18/2017 1.2 0.5 - 1.4 mg/dL Final     Calcium   Date Value Ref Range Status   01/18/2017 8.8 8.7 - 10.5 mg/dL Final     Total Protein   Date Value Ref Range Status   01/18/2017 6.2 6.0 - 8.4 g/dL Final     Albumin   Date Value Ref Range Status   01/18/2017 3.5 3.5 - 5.2 g/dL Final     Total Bilirubin   Date Value Ref Range Status   01/18/2017 0.3 0.1 - 1.0 mg/dL Final     Comment:     For infants and newborns, interpretation of results should be based  on gestational age, weight and in agreement with clinical  observations.  Premature Infant recommended reference ranges:  Up to 24 hours.............<8.0 mg/dL  Up to 48 hours............<12.0 mg/dL  3-5 days..................<15.0 mg/dL  6-29 days.................<15.0 mg/dL       Alkaline Phosphatase   Date Value Ref Range Status   01/18/2017 62 55 - 135 U/L Final     AST   Date Value Ref Range Status   01/18/2017 21 10 - 40 U/L Final     ALT   Date Value Ref Range Status   01/18/2017 17 10 - 44 U/L Final     Anion  Gap   Date Value Ref Range Status   01/18/2017 9 8 - 16 mmol/L Final     eGFR if    Date Value Ref Range Status   01/18/2017 >60 >60 mL/min/1.73 m^2 Final     eGFR if non    Date Value Ref Range Status   01/18/2017 >60 >60 mL/min/1.73 m^2 Final     Comment:     Calculation used to obtain the estimated glomerular filtration  rate (eGFR) is the CKD-EPI equation. Since race is unknown   in our information system, the eGFR values for   -American and Non--American patients are given   for each creatinine result.       CT Soft Tissue Neck With Contrast 02/09/2017 None Specified          RESULTS:  CT neck, chest, abdomen, and pelvis with contrast     No comparison     Technique: Axial images through the neck, chest, abdomen, and pelvis were acquired following intravenous administration of 100 mL Omnipaque 350. Multiplanar reformatted images were created.     FINDINGS:     CT NECK: There is mild, symmetric bilateral cervical lymphadenopathy, with a right level III lymph node measuring 11 mm short axis representing one of the larger nodes. The airway is midline and patent. The thyroid, submandibular, and parotid glands are unremarkable. Major vascular structures of the neck are patent. Mild C5-6 degenerative disc changes noted. No suspicious osseous lesions.     CT CHEST: There is a mildly enlarged superior mediastinal lymph node measuring 11 mm located posterior to the trachea just below the thoracic inlet. Otherwise, there are no enlarged hilar, mediastinal, or axillary lymph nodes.     The heart size is normal without pericardial effusion. The thoracic aorta is normal caliber. The lungs are clear. There is no pleural effusion. There are no suspicious osseous lesions.     CT ABDOMEN and PELVIS: There is no venkata abdominal or pelvic lymphadenopathy. There is a prominent 8mm lymph node anterior to the aortic hiatus.     The liver and spleen are normal in size. The gallbladder,  pancreas, and adrenal glands are unremarkable.     There are 3 lesions of the right kidney exhibiting density above that to allow for characterization of simple cysts. These measure 2.9, 2.7, and 0.9 cm, respectively. A small cyst of the left kidney is noted.     The small bowel is normal in caliber and appearance. There is moderate diverticulosis coli. Small hiatal hernia is present.     There are no suspicious osseous lesions.  IMPRESSION:      1. Mild cervical lymphadenopathy.  2. Mildly enlarged superior mediastinal lymph node with otherwise no lymphadenopathy within the chest, abdomen, or pelvis.  3. Indeterminant right renal lesions for which further evaluation with renal ultrasound is recommended.           Electronically signed by: Hayden Marshall MD     Pt had retroperitoneal ultrasound that showed renal mass vs complex cyst for which pt has follow up with Dr Lovell  Beta 2 normal  And LDH normal      CLL (chronic lymphocytic leukemia)  -     CBC auto differential; Future; Expected date: 2/15/17  -     FREE LT CHAIN ANAL; Future; Expected date: 2/15/17    Anemia, chronic disease    IC (interstitial cystitis)          Pt given the diagnosis and staging as a stage 3 CLL due to lymphocytosis and anemia    I explained the palpable lymph node in the cervical region is completely benign and does not meet criteria for pathological node  Once I have the above tests to make further recommendations to the patient back for follow-up  Repeat labs every 6 months for stability  Add B12 folic acid and iron to daily diet to stimulate marrow    I tried to reassure the patient that chronic leukemias completely different than acute leukemia and I hope I relieve his worry    Thank you for allowing me to evaluate and participate in the care of this pleasant patient. Please fell free to call me with any questions or concerns.    [place]armly,  Leilani Simon MD    This note was dictated with Dragon and briefly proofread. Please excuse  any sentences that may be unclear or words misspelled

## 2017-02-21 ENCOUNTER — OFFICE VISIT (OUTPATIENT)
Dept: UROLOGY | Facility: CLINIC | Age: 57
End: 2017-02-21
Payer: MEDICARE

## 2017-02-21 VITALS
TEMPERATURE: 98 F | BODY MASS INDEX: 28.21 KG/M2 | DIASTOLIC BLOOD PRESSURE: 82 MMHG | HEART RATE: 61 BPM | HEIGHT: 71 IN | SYSTOLIC BLOOD PRESSURE: 141 MMHG | RESPIRATION RATE: 18 BRPM | WEIGHT: 201.5 LBS

## 2017-02-21 DIAGNOSIS — N30.10 IC (INTERSTITIAL CYSTITIS): ICD-10-CM

## 2017-02-21 DIAGNOSIS — N28.89 RIGHT RENAL MASS: ICD-10-CM

## 2017-02-21 DIAGNOSIS — N40.0 BENIGN NODULAR PROSTATIC HYPERPLASIA, PRESENCE OF LOWER URINARY TRACT SYMPTOMS UNSPECIFIED: ICD-10-CM

## 2017-02-21 DIAGNOSIS — C91.10 CLL (CHRONIC LYMPHOCYTIC LEUKEMIA): ICD-10-CM

## 2017-02-21 DIAGNOSIS — R31.29 MICROSCOPIC HEMATURIA: Primary | ICD-10-CM

## 2017-02-21 LAB
BILIRUB SERPL-MCNC: ABNORMAL MG/DL
BLOOD URINE, POC: ABNORMAL
COLOR, POC UA: YELLOW
GLUCOSE UR QL STRIP: ABNORMAL
KETONES UR QL STRIP: ABNORMAL
LEUKOCYTE ESTERASE URINE, POC: ABNORMAL
NITRITE, POC UA: ABNORMAL
PH, POC UA: 5
PROTEIN, POC: ABNORMAL
SPECIFIC GRAVITY, POC UA: 1.01
UROBILINOGEN, POC UA: ABNORMAL

## 2017-02-21 PROCEDURE — 81002 URINALYSIS NONAUTO W/O SCOPE: CPT | Mod: PBBFAC,PO | Performed by: UROLOGY

## 2017-02-21 PROCEDURE — 81000 URINALYSIS NONAUTO W/SCOPE: CPT | Mod: PBBFAC,PO | Performed by: UROLOGY

## 2017-02-21 PROCEDURE — 99213 OFFICE O/P EST LOW 20 MIN: CPT | Mod: 25,S$PBB,, | Performed by: UROLOGY

## 2017-02-21 PROCEDURE — 99999 PR PBB SHADOW E&M-EST. PATIENT-LVL III: CPT | Mod: PBBFAC,,, | Performed by: UROLOGY

## 2017-02-21 PROCEDURE — 99213 OFFICE O/P EST LOW 20 MIN: CPT | Mod: PBBFAC,PO | Performed by: UROLOGY

## 2017-02-21 NOTE — PROGRESS NOTES
OFFICE NOTE    CHIEF COMPLAINT:  Interstitial cystitis, right renal mass, renal cyst, recent   diagnosis of leukemia (CLL).    HISTORY OF PRESENT ILLNESS:  This 57-year-old male  had undergone workup for   hematuria and interstitial cystitis that included CT scan of the abdomen and   pelvis.  He also underwent cystoscopy and bladder hydrodistention under general   anesthesia on 01/23/2003 for treatment of interstitial cystitis.  On followup   visit today, he states he is noticing continued improvement in his voiding.  He   had been referred to Dr. Simon after he was found to have an abnormal blood smear   consistent with CLL and is being followed conservatively by Dr. Simon, does not   require any further treatment related to the condition at this time.  A CT scan   also revealed a presence of a suspicious or questionable right renal mass, and   renal ultrasound done recently on 02/15/2017 also revealed a 12 mm mass in the   right kidney, which was also inconclusive as to whether there was a solid mass   or complex cyst.  In addition, they are aware that bilateral renal cyst of the   patient was already noted to have on the CT scan.  After further discussion    with the patient It was suggested the next step would be to obtain an MRI of the abdomen   using renal mass protocol with contrast, which had been recommended by the   radiologist and the patient stated he understood and agreed.    His last PSA was 3.3 on 11/10/2016.    UA today is a trace of blood, pH 5.0.     IMPRESSION:  Interstitial cystitis, chronic lymphocytic leukemia recently   diagnosed and right renal mass inconclusive as to whether it could be a solid   mass or a complex cyst.    RECOMMENDATION:  We will obtain an MRI of the abdomen using renal mass protocol   with contrast and contact the patient with the findings.      MD/KEITH  dd: 02/21/2017 11:03:49 (CST)  td: 02/21/2017 17:59:41 (CST)  Doc ID   #[id number]  Job ID #[id number]    CC:

## 2017-02-21 NOTE — MR AVS SNAPSHOT
[facility]  [address]  [place] [address]  Phone: [phone]                  Gera Doran   2017 10:45 AM   Office Visit    Description:  Male : 1960   Provider:  Damian De La Cruz MD   Department:  [facility]           Reason for Visit     Follow-up           Diagnoses this Visit        Comments    Microscopic hematuria    -  Primary     Right renal mass                To Do List           Future Appointments        Provider Department Dept Phone    3/1/2017 8:00 AM [facility] MRI1 [address] LIMIT [facility] [phone]    3/2/2017 2:30 PM Rupa Allison NP [place] - Neurosurgery [phone]    2017 9:00 AM LAB, [facility] [phone]    8/15/2017 9:00 AM MD [facility] - Hematology Oncology [phone]      Goals (5 Years of Data)     None      [facility]     [facility] Nurse Care [facility] -  Assistance  Registered nurses in the [facility] provide clinical advisement, health education, appointment booking, and other advisory services.  Call for this free service at [phone].             Medications           Message regarding Medications     Verify the changes and/or additions to your medication regime listed below are the same as discussed with your clinician today.  If any of these changes or additions are incorrect, please notify your healthcare provider.             Verify that the below list of medications is an accurate representation of the medications you are currently taking.  If none reported, the list may be blank. If incorrect, please contact your healthcare provider. Carry this list with you in case of emergency.           Current Medications     aspirin-salicylamide-caffeine (BC HEADACHE POWDER) 650-195-32 mg Pack Take 1 packet by mouth once daily.    COMBIVENT RESPIMAT  mcg/actuation inhaler     fluticasone (FLONASE) 50  "mcg/actuation nasal spray 1 spray by Nasal route as needed.     omeprazole (PRILOSEC) 20 MG capsule Take 20 mg by mouth once daily.    sertraline (ZOLOFT) 100 MG tablet Take 100 mg by mouth once daily. 1/2 daily           Clinical Reference Information           Your Vitals Were     BP Pulse Temp Resp Height Weight    141/82 61 98.1 °F (36.7 °C) (Oral) 18 5' 11" (1.803 m) 91.4 kg (201 lb 8 oz)    BMI                28.1 kg/m2          Blood Pressure          Most Recent Value    BP  (!)  141/82      Allergies as of 2/21/2017     Imitrex [Sumatriptan Succinate]    Sulfa (Sulfonamide Antibiotics)    Amoxicillin      Immunizations Administered on Date of Encounter - 2/21/2017     None      Orders Placed During Today's Visit      Normal Orders This Visit    POCT URINE DIPSTICK WITHOUT MICROSCOPE     Future Labs/Procedures Expected by Expires    MRI Abdomen W WO Contrast  2/21/2017 2/21/2018 2/21/2017 10:39 AM - Treasure Strong MA      Component Results     Component    Color    yellow    Spec Grav    1.015    pH, UA    5.0    WBC, UA    neg    Nitrite    neg    Protein    trace    Glucose, UA    neg    Ketones, UA    neg    Urobilinogen    neg    Bilirubin    neg    Blood, UA    trace            Language Assistance Services     ATTENTION: Language assistance services are available, free of charge. Please call [phone].      ATENCIÓN: Si habla español, tiene a mckeon disposición servicios gratuitos de asistencia lingüística. Llame al [phone].     PATRICIA Ý: N?u b?n nói Ti?ng Vi?t, có các d?ch v? h? tr? ngôn ng? mi?n phí dành cho b?n. G?i s? [phone].         [facility] complies with applicable Federal civil rights laws and does not discriminate on the basis of race, color, national origin, age, disability, or sex.        "

## 2017-03-01 ENCOUNTER — HOSPITAL ENCOUNTER (OUTPATIENT)
Dept: RADIOLOGY | Facility: HOSPITAL | Age: 57
Discharge: HOME | End: 2017-03-01
Attending: UROLOGY
Payer: MEDICARE

## 2017-03-01 DIAGNOSIS — R31.29 MICROSCOPIC HEMATURIA: ICD-10-CM

## 2017-03-01 DIAGNOSIS — N28.89 RIGHT RENAL MASS: ICD-10-CM

## 2017-03-01 PROCEDURE — 74183 MRI ABD W/O CNTR FLWD CNTR: CPT | Mod: TC

## 2017-03-01 PROCEDURE — 25500020 PHARM REV CODE 255: Performed by: UROLOGY

## 2017-03-01 PROCEDURE — A9585 GADOBUTROL INJECTION: HCPCS | Performed by: UROLOGY

## 2017-03-01 PROCEDURE — 74183 MRI ABD W/O CNTR FLWD CNTR: CPT | Mod: 26,,, | Performed by: RADIOLOGY

## 2017-03-01 RX ORDER — GADOBUTROL 604.72 MG/ML
INJECTION INTRAVENOUS
Status: DISPENSED
Start: 2017-03-01 | End: 2017-03-01

## 2017-03-01 RX ORDER — GADOBUTROL 604.72 MG/ML
9 INJECTION INTRAVENOUS
Status: COMPLETED | OUTPATIENT
Start: 2017-03-01 | End: 2017-03-01

## 2017-03-01 RX ADMIN — GADOBUTROL 9 ML: 604.72 INJECTION INTRAVENOUS at 08:03

## 2017-03-02 ENCOUNTER — OFFICE VISIT (OUTPATIENT)
Dept: NEUROSURGERY | Facility: CLINIC | Age: 57
End: 2017-03-02
Payer: MEDICARE

## 2017-03-02 VITALS
SYSTOLIC BLOOD PRESSURE: 148 MMHG | WEIGHT: 197.31 LBS | BODY MASS INDEX: 27.62 KG/M2 | HEART RATE: 65 BPM | DIASTOLIC BLOOD PRESSURE: 85 MMHG | HEIGHT: 71 IN

## 2017-03-02 DIAGNOSIS — M54.41 CHRONIC RIGHT-SIDED LOW BACK PAIN WITH RIGHT-SIDED SCIATICA: Primary | ICD-10-CM

## 2017-03-02 DIAGNOSIS — G89.29 CHRONIC RIGHT-SIDED LOW BACK PAIN WITH RIGHT-SIDED SCIATICA: Primary | ICD-10-CM

## 2017-03-02 DIAGNOSIS — M48.061 LUMBAR STENOSIS: ICD-10-CM

## 2017-03-02 PROCEDURE — 99203 OFFICE O/P NEW LOW 30 MIN: CPT | Mod: S$GLB,,, | Performed by: NURSE PRACTITIONER

## 2017-03-02 NOTE — MR AVS SNAPSHOT
[facility] Neurosurgery  [address] [facility] [address] [place] [address]  Phone: [phone]  Fax: [phone]                  Gera Doran   3/2/2017 2:30 PM   Office Visit    Description:  Male : 1960   Provider:  Rupa Allison NP   Department:  [place] - Neurosurgery           Reason for Visit     Lumbar Spine Pain (L-Spine)           Diagnoses this Visit        Comments    Lumbar stenosis    -  Primary            To Do List           Future Appointments        Provider Department Dept Phone    3/22/2017 11:30 AM [facility] MRI1 [facility] [phone]    3/22/2017 12:00 PM [facility] XR3 [facility] [phone]    3/22/2017 12:30 PM Rupa Allison NP [facility] [phone]    2017 9:00 AM LAB, [facility]OSP [facility] [phone]    8/15/2017 9:00 AM Leilani Simon MD [facility] Hematology Oncology [phone]      Goals (5 Years of Data)     None      [facility]     [facility] Care [facility] -  Assistance  Registered nurses in the [facility] provide clinical advisement, health education, appointment booking, and other advisory services.  Call for this free service at [phone].             Medications           Message regarding Medications     Verify the changes and/or additions to your medication regime listed below are the same as discussed with your clinician today.  If any of these changes or additions are incorrect, please notify your healthcare provider.             Verify that the below list of medications is an accurate representation of the medications you are currently taking.  If none reported, the list may be blank. If incorrect, please contact your healthcare provider. Carry this list with you in case of emergency.           Current Medications     aspirin-salicylamide-caffeine (BC HEADACHE POWDER) 650-195-32 mg Pack Take 1 packet by mouth once daily.    COMBIVENT RESPIMAT   mcg/actuation inhaler     fluticasone (FLONASE) 50 mcg/actuation nasal spray 1 spray by Nasal route as needed.     omeprazole (PRILOSEC) 20 MG capsule Take 20 mg by mouth once daily.    sertraline (ZOLOFT) 100 MG tablet Take 100 mg by mouth once daily. 1/2 daily           Clinical Reference Information           Your Vitals Were     BP                   148/85           Blood Pressure          Most Recent Value    BP  (!)  148/85      Allergies as of 3/2/2017     Imitrex [Sumatriptan Succinate]    Sulfa (Sulfonamide Antibiotics)    Amoxicillin      Immunizations Administered on Date of Encounter - 3/2/2017     None      Orders Placed During Today's Visit     Future Labs/Procedures Expected by Expires    MRI Lumbar Spine Without Contrast  3/2/2017 3/2/2018    X-Ray Lumbar Complete With Flex And Ext  3/2/2017 3/2/2018      Language Assistance Services     ATTENTION: Language assistance services are available, free of charge. Please call [phone].      ATENCIÓN: Si habla español, tiene a mckeon disposición servicios gratuitos de asistencia lingüística. Llame al [phone].     CHÚ Ý: N?u b?n nói Ti?ng Vi?t, có các d?ch v? h? tr? ngôn ng? mi?n phí dành cho b?n. G?i s? [phone].         [facility] complies with applicable Federal civil rights laws and does not discriminate on the basis of race, color, national origin, age, disability, or sex.

## 2017-03-02 NOTE — PROGRESS NOTES
Neurosurgery History & Physical    Patient ID: Gera Doran is a 57 y.o. male.    Chief Complaint   Patient presents with    Lumbar Spine Pain (L-Spine)     9 year history of worsening low back pain noting pain to his right posterior thigh. Denies bladder or bowel dysfunction. Pain is increased with sexual activity or riding in the car. Alleviated by stretching. Patient has done PT and chiropractic care without improvement. He has not had injections.        Review of Systems   Constitutional: Negative for activity change, appetite change, chills, fever and unexpected weight change.   HENT: Negative for tinnitus, trouble swallowing and voice change.    Respiratory: Negative for apnea, cough, chest tightness and shortness of breath.    Cardiovascular: Negative for chest pain and palpitations.   Gastrointestinal: Negative for constipation, diarrhea, nausea and vomiting.   Genitourinary: Negative for difficulty urinating, dysuria, frequency and urgency.   Musculoskeletal: Positive for back pain and gait problem. Negative for neck pain and neck stiffness.   Skin: Negative for wound.   Neurological: Negative for dizziness, tremors, seizures, facial asymmetry, speech difficulty, weakness, light-headedness, numbness and headaches.   Psychiatric/Behavioral: Negative for confusion and decreased concentration.       Past Medical History:   Diagnosis Date    Arthritis     Asthma     Chemical exposure    Chemical exposure     TO LUNG    GERD (gastroesophageal reflux disease)     IC (interstitial cystitis)     Leukemia     RLS (restless legs syndrome)     Sleep apnea     NO MACHINE     Social History     Social History    Marital status:      Spouse name: N/A    Number of children: N/A    Years of education: N/A     Occupational History    Not on file.     Social History Main Topics    Smoking status: Never Smoker    Smokeless tobacco: Never Used    Alcohol use Yes      Comment: OCC    Drug use: No  "   Sexual activity: Not on file     Other Topics Concern    Not on file     Social History Narrative     History reviewed. No pertinent family history.  Review of patient's allergies indicates:   Allergen Reactions    Imitrex [sumatriptan succinate] Itching    Sulfa (sulfonamide antibiotics) Itching    Amoxicillin Itching       Current Outpatient Prescriptions:     aspirin-salicylamide-caffeine (BC HEADACHE POWDER) 650-195-32 mg Pack, Take 1 packet by mouth once daily., Disp: , Rfl:     COMBIVENT RESPIMAT  mcg/actuation inhaler, , Disp: , Rfl:     fluticasone (FLONASE) 50 mcg/actuation nasal spray, 1 spray by Nasal route as needed. , Disp: , Rfl:     omeprazole (PRILOSEC) 20 MG capsule, Take 20 mg by mouth once daily., Disp: , Rfl:     sertraline (ZOLOFT) 100 MG tablet, Take 100 mg by mouth once daily. 1/2 daily, Disp: , Rfl:     [DISCONTINUED] alfuzosin (UROXATRAL) 10 mg Tb24, 10 mg daily with breakfast. , Disp: , Rfl:   No current facility-administered medications for this visit.   Blood pressure (!) 148/85, pulse 65, height 5' 11" (1.803 m), weight 89.5 kg (197 lb 5 oz).      Neurologic Exam     Mental Status   Oriented to person, place, and time.   Follows 3 step commands.   Attention: normal. Concentration: normal.   Speech: speech is normal   Level of consciousness: alert  Knowledge: consistent with education.   Able to name object. Able to read. Able to repeat. Able to write. Normal comprehension.     Cranial Nerves     CN II   Visual acuity: normal  Right visual field deficit: none  Left visual field deficit: none     CN III, IV, VI   Pupils are equal, round, and reactive to light.  Extraocular motions are normal.   Right pupil: Size: 3 mm. Shape: regular. Reactivity: brisk. Consensual response: intact. Accommodation: intact.   Left pupil: Size: 3 mm. Shape: regular. Reactivity: brisk. Consensual response: intact. Accommodation: intact.   CN III: no CN III palsy  Nystagmus: none   Diplopia: " none  Ophthalmoparesis: none  Conjugate gaze: present    CN V   Right facial sensation deficit: none  Left facial sensation deficit: none    CN VII   Right facial weakness: none  Left facial weakness: none    CN VIII   Hearing: intact    CN IX, X   CN IX normal.   CN X normal.     CN XI   Right sternocleidomastoid strength: normal  Left sternocleidomastoid strength: normal  Right trapezius strength: normal  Left trapezius strength: normal    CN XII   Fasciculations: absent  Tongue deviation: none    Motor Exam   Muscle bulk: normal  Overall muscle tone: normal  Right arm pronator drift: absent  Left arm pronator drift: absent    Strength   Strength 5/5 throughout.   Right neck flexion: 5/5  Left neck flexion: 5/5  Right neck extension: 5/5  Left neck extension: 5/5  Right deltoid: 5/5  Left deltoid: 5/5  Right biceps: 5/5  Left biceps: 5/5  Right triceps: 5/5  Left triceps: 5/5  Right wrist flexion: 5/5  Left wrist flexion: 5/5  Right wrist extension: 5/5  Left wrist extension: 5/5  Right interossei: 5/5  Left interossei: 5/5  Right abdominals: 5/5  Left abdominals: 5/5  Right iliopsoas: 5/5  Left iliopsoas: 5/5  Right quadriceps: 5/5  Left quadriceps: 5/5  Right hamstrin/5  Left hamstrin/5  Right glutei: 5/5  Left glutei: 5/5  Right anterior tibial: 5/5  Left anterior tibial: 5/5  Right posterior tibial: 5/5  Left posterior tibial: 5/5  Right peroneal: 5/5  Left peroneal: 5/5  Right gastroc: 5/5  Left gastroc: 5/5    Sensory Exam   Light touch normal.   Vibration normal.   Pinprick normal.     Gait, Coordination, and Reflexes     Gait  Gait: normal    Coordination   Romberg: negative  Finger to nose coordination: normal  Heel to shin coordination: normal  Tandem walking coordination: normal    Tremor   Resting tremor: absent  Intention tremor: absent  Action tremor: absent    Reflexes   Right brachioradialis: 2+  Left brachioradialis: 2+  Right biceps: 2+  Left biceps: 2+  Right triceps: 2+  Left triceps:  2+  Right patellar: 2+  Left patellar: 2+  Right achilles: 2+  Left achilles: 2+  Right : 2+  Left : 2+  Right plantar: normal  Left plantar: normal  Right Flores: absent  Left Flores: absent  Right ankle clonus: absent  Left ankle clonus: absent      Physical Exam   Constitutional: He is oriented to person, place, and time. He appears well-developed and well-nourished.   HENT:   Head: Normocephalic and atraumatic.   Eyes: EOM are normal. Pupils are equal, round, and reactive to light.   Neck: Normal range of motion. Neck supple. Thyromegaly present.   Cardiovascular: Normal rate, regular rhythm and normal pulses.    Pulmonary/Chest: Effort normal.   Musculoskeletal: He exhibits no edema.        Lumbar back: He exhibits decreased range of motion and pain.   Neurological: He is oriented to person, place, and time. He has normal strength. He has a normal Finger-Nose-Finger Test, a normal Heel to Shin Test, a normal Romberg Test and a normal Tandem Gait Test. Gait normal.   Reflex Scores:       Tricep reflexes are 2+ on the right side and 2+ on the left side.       Bicep reflexes are 2+ on the right side and 2+ on the left side.       Brachioradialis reflexes are 2+ on the right side and 2+ on the left side.       Patellar reflexes are 2+ on the right side and 2+ on the left side.       Achilles reflexes are 2+ on the right side and 2+ on the left side.  Skin: Skin is warm, dry and intact.   Psychiatric: He has a normal mood and affect. His speech is normal and behavior is normal. Judgment and thought content normal. Cognition and memory are normal.   Nursing note and vitals reviewed.      Oswestry Disability Index score: 36%    Patient Health Questionnaire score: 9    Provider dictation:  The patient is a 57 year-old male with CLL referred by Dr Simon for evaluation of low back pain. He states the pain started approximately 9 years ago and radiates to his right leg. He denies numbness or weakness. He feels  his right thigh pain is equally as bothersome as his low back pain. The pain is described as aching, burning, and is exacerbated with walking, prolonged standing/sitting, and/or lying down. He denies any recent injury or trauma. He has tried physical therapy without relief. He has not tried injections. He denies bowel or bladder dysfunction. He takes BC powder as needed for pain.    He is neurologically intact on examination except for pain-limited lumbar ROM, particularly with right lateral bending.    The patient is likely suffering from musculoskeletal spasm and would benefit from continued physical therapy exercise for muscle stretching and core strengthening exercises, traction, intermittent immobilization with brace, NSAIDs, antispasmodics, and/or Pain Management referral for injections.     We will obtain a lumbar MRI and dynamic x-rays to rule out any neural compression or instability. We will determine further plan of care after imaging reviewed.     Gera was seen today for lumbar spine pain (l-spine).    Diagnoses and all orders for this visit:    Lumbar stenosis  -     MRI Lumbar Spine Without Contrast; Future  -     X-Ray Lumbar Complete With Flex And Ext; Future

## 2017-03-02 NOTE — LETTER
March 6, 2017      Leilani Simon MD  [address] [place] [address] [address]  [place] [address]           [place] - Neurosurgery  [address] [facility] [address] [place] [address]  Phone: [phone]  Fax: [phone]          Patient: Gera Doran   MR Number: [id number]   YOB: 1960   Date of Visit: 3/2/2017       Dear Dr. Leilani Simon:    Thank you for referring Gera Doran to me for evaluation. Attached you will find relevant portions of my assessment and plan of care.    If you have questions, please do not hesitate to call me. I look forward to following Gera Doran along with you.    Sincerely,    Rupa Allison, NP    Enclosure  CC:  No Recipients    If you would like to receive this communication electronically, please contact [email or web address] or [phone] to request more information on [email or web address] Link access.    For providers and/or their staff who would like to refer a patient to [facility], please contact us through our one-stop-shop provider referral line, [facility] , at [phone].    If you feel you have received this communication in error or would no longer like to receive these types of communications, please e-mail [email or web address]

## 2017-03-22 ENCOUNTER — OFFICE VISIT (OUTPATIENT)
Dept: NEUROSURGERY | Facility: CLINIC | Age: 57
End: 2017-03-22
Payer: MEDICARE

## 2017-03-22 ENCOUNTER — HOSPITAL ENCOUNTER (OUTPATIENT)
Dept: RADIOLOGY | Facility: HOSPITAL | Age: 57
Discharge: HOME | End: 2017-03-22
Attending: NURSE PRACTITIONER
Payer: MEDICARE

## 2017-03-22 VITALS
WEIGHT: 200.19 LBS | BODY MASS INDEX: 28.02 KG/M2 | HEART RATE: 67 BPM | SYSTOLIC BLOOD PRESSURE: 142 MMHG | HEIGHT: 71 IN | DIASTOLIC BLOOD PRESSURE: 92 MMHG

## 2017-03-22 DIAGNOSIS — M48.061 LUMBAR STENOSIS: ICD-10-CM

## 2017-03-22 DIAGNOSIS — M51.37 DEGENERATION OF LUMBAR/LUMBOSACRAL DISC WITHOUT MYELOPATHY: ICD-10-CM

## 2017-03-22 DIAGNOSIS — M47.819 FACET ARTHROPATHY: Primary | ICD-10-CM

## 2017-03-22 PROCEDURE — 72114 X-RAY EXAM L-S SPINE BENDING: CPT | Mod: 26,,, | Performed by: RADIOLOGY

## 2017-03-22 PROCEDURE — 99214 OFFICE O/P EST MOD 30 MIN: CPT | Mod: S$GLB,,, | Performed by: NURSE PRACTITIONER

## 2017-03-22 PROCEDURE — 72148 MRI LUMBAR SPINE W/O DYE: CPT | Mod: 26,,, | Performed by: RADIOLOGY

## 2017-03-22 PROCEDURE — 72148 MRI LUMBAR SPINE W/O DYE: CPT | Mod: TC,PO

## 2017-03-22 PROCEDURE — 72114 X-RAY EXAM L-S SPINE BENDING: CPT | Mod: TC,PO

## 2017-03-22 NOTE — PROGRESS NOTES
Neurosurgery History & Physical    Patient ID: Gera Doran is a 57 y.o. male.    Chief Complaint   Patient presents with    Follow-up     lbp f/u with MRI and XR. pt states flacuating levels of pain.        Review of Systems   Constitutional: Negative for activity change, appetite change, chills, fever and unexpected weight change.   HENT: Negative for tinnitus, trouble swallowing and voice change.    Respiratory: Negative for apnea, cough, chest tightness and shortness of breath.    Cardiovascular: Negative for chest pain and palpitations.   Gastrointestinal: Negative for constipation, diarrhea, nausea and vomiting.   Genitourinary: Negative for difficulty urinating, dysuria, frequency and urgency.   Musculoskeletal: Positive for back pain and gait problem. Negative for neck pain and neck stiffness.   Skin: Negative for wound.   Neurological: Negative for dizziness, tremors, seizures, facial asymmetry, speech difficulty, weakness, light-headedness, numbness and headaches.   Psychiatric/Behavioral: Negative for confusion and decreased concentration.       Past Medical History:   Diagnosis Date    Arthritis     Asthma     Chemical exposure    Chemical exposure     TO LUNG    GERD (gastroesophageal reflux disease)     IC (interstitial cystitis)     Leukemia     RLS (restless legs syndrome)     Sleep apnea     NO MACHINE     Social History     Social History    Marital status:      Spouse name: N/A    Number of children: N/A    Years of education: N/A     Occupational History    Not on file.     Social History Main Topics    Smoking status: Never Smoker    Smokeless tobacco: Never Used    Alcohol use Yes      Comment: OCC    Drug use: No    Sexual activity: Not on file     Other Topics Concern    Not on file     Social History Narrative     No family history on file.  Review of patient's allergies indicates:   Allergen Reactions    Imitrex [sumatriptan succinate] Itching    Sulfa  "(sulfonamide antibiotics) Itching    Amoxicillin Itching       Current Outpatient Prescriptions:     aspirin-salicylamide-caffeine (BC HEADACHE POWDER) 650-195-32 mg Pack, Take 1 packet by mouth once daily., Disp: , Rfl:     COMBIVENT RESPIMAT  mcg/actuation inhaler, , Disp: , Rfl:     fluticasone (FLONASE) 50 mcg/actuation nasal spray, 1 spray by Nasal route as needed. , Disp: , Rfl:     omeprazole (PRILOSEC) 20 MG capsule, Take 20 mg by mouth once daily., Disp: , Rfl:     sertraline (ZOLOFT) 100 MG tablet, Take 100 mg by mouth once daily. 1/2 daily, Disp: , Rfl:     [DISCONTINUED] alfuzosin (UROXATRAL) 10 mg Tb24, 10 mg daily with breakfast. , Disp: , Rfl:   Blood pressure (!) 142/92, pulse 67, height 5' 11" (1.803 m), weight 90.8 kg (200 lb 2.8 oz).      Neurologic Exam     Mental Status   Oriented to person, place, and time.   Follows 3 step commands.   Attention: normal. Concentration: normal.   Speech: speech is normal   Level of consciousness: alert  Knowledge: consistent with education.   Able to name object. Able to read. Able to repeat. Able to write. Normal comprehension.     Cranial Nerves     CN II   Visual acuity: normal  Right visual field deficit: none  Left visual field deficit: none     CN III, IV, VI   Pupils are equal, round, and reactive to light.  Extraocular motions are normal.   Right pupil: Size: 3 mm. Shape: regular. Reactivity: brisk. Consensual response: intact. Accommodation: intact.   Left pupil: Size: 3 mm. Shape: regular. Reactivity: brisk. Consensual response: intact. Accommodation: intact.   CN III: no CN III palsy  Nystagmus: none   Diplopia: none  Ophthalmoparesis: none  Conjugate gaze: present    CN V   Right facial sensation deficit: none  Left facial sensation deficit: none    CN VII   Right facial weakness: none  Left facial weakness: none    CN VIII   Hearing: intact    CN IX, X   CN IX normal.   CN X normal.     CN XI   Right sternocleidomastoid strength: " normal  Left sternocleidomastoid strength: normal  Right trapezius strength: normal  Left trapezius strength: normal    CN XII   Fasciculations: absent  Tongue deviation: none    Motor Exam   Muscle bulk: normal  Overall muscle tone: normal  Right arm pronator drift: absent  Left arm pronator drift: absent    Strength   Strength 5/5 throughout.   Right neck flexion: 5/5  Left neck flexion: 5/5  Right neck extension: 5/5  Left neck extension: 5/5  Right deltoid: 5/5  Left deltoid: 5/5  Right biceps: 5/5  Left biceps: 5/5  Right triceps: 5/5  Left triceps: 5/5  Right wrist flexion: 5/5  Left wrist flexion: 5/5  Right wrist extension: 5/5  Left wrist extension: 5/5  Right interossei: 5/5  Left interossei: 5/5  Right abdominals: 5/5  Left abdominals: 5/5  Right iliopsoas: 5/5  Left iliopsoas: 5/5  Right quadriceps: 5/5  Left quadriceps: 5/5  Right hamstrin/5  Left hamstrin/5  Right glutei: 5/5  Left glutei: 5/5  Right anterior tibial: 5/5  Left anterior tibial: 5/5  Right posterior tibial: 5/5  Left posterior tibial: 5/5  Right peroneal: 5/5  Left peroneal: 5/5  Right gastroc: 5/5  Left gastroc: 5/5    Sensory Exam   Light touch normal.   Vibration normal.   Pinprick normal.     Gait, Coordination, and Reflexes     Gait  Gait: normal    Coordination   Romberg: negative  Finger to nose coordination: normal  Heel to shin coordination: normal  Tandem walking coordination: normal    Tremor   Resting tremor: absent  Intention tremor: absent  Action tremor: absent    Reflexes   Right brachioradialis: 2+  Left brachioradialis: 2+  Right biceps: 2+  Left biceps: 2+  Right triceps: 2+  Left triceps: 2+  Right patellar: 2+  Left patellar: 2+  Right achilles: 2+  Left achilles: 2+  Right : 2+  Left : 2+  Right plantar: normal  Left plantar: normal  Right Flores: absent  Left Flores: absent  Right ankle clonus: absent  Left ankle clonus: absent      Physical Exam   Constitutional: He is oriented to person, place,  "and time. He appears well-developed and well-nourished.   HENT:   Head: Normocephalic and atraumatic.   Eyes: EOM are normal. Pupils are equal, round, and reactive to light.   Neck: Normal range of motion. Neck supple.   Cardiovascular: Normal rate, regular rhythm and normal pulses.    Pulmonary/Chest: Effort normal.   Musculoskeletal: He exhibits no edema.        Lumbar back: He exhibits decreased range of motion and pain.   Neurological: He is oriented to person, place, and time. He has normal strength. He has a normal Finger-Nose-Finger Test, a normal Heel to Shin Test, a normal Romberg Test and a normal Tandem Gait Test. Gait normal.   Reflex Scores:       Tricep reflexes are 2+ on the right side and 2+ on the left side.       Bicep reflexes are 2+ on the right side and 2+ on the left side.       Brachioradialis reflexes are 2+ on the right side and 2+ on the left side.       Patellar reflexes are 2+ on the right side and 2+ on the left side.       Achilles reflexes are 2+ on the right side and 2+ on the left side.  Skin: Skin is warm, dry and intact.   Psychiatric: He has a normal mood and affect. His speech is normal and behavior is normal. Judgment and thought content normal. Cognition and memory are normal.   Nursing note and vitals reviewed.      Oswestry Disability Index score: 36%    Patient Health Questionnaire score: 9    Provider dictation:  The patient is a 57 year-old male following up with new imaging. He was recently evaluated for low back pain.     Per previous visit: "He states the pain started approximately 9 years ago and radiates to his right leg. He denies numbness or weakness. He feels his right thigh pain is equally as bothersome as his low back pain. The pain is described as aching, burning, and is exacerbated with walking, prolonged standing/sitting, and/or lying down. He denies any recent injury or trauma. He has tried physical therapy without relief. He has not tried injections. He " "denies bowel or bladder dysfunction. He takes BC powder as needed for pain."    He is neurologically intact on examination except for pain-limited lumbar ROM, particularly with right lateral bending.    Lumbar MRI reviewed and reveals an overall normal spine for the patient's age. He has mild degenerative changes without any significant central or foraminal stenosis. He has mild facet arthropathy. No fractures. There is grade 1 retrolisthesis of L5 on S1.     Dynamic x-rays do not reveal any instability. There is loss of lordotic curvature.     Given the above, his axial back pain may be related to spasm and/or facet disease. For this I have recommended physical therapy exercise for muscle stretching and core strengthening exercises, traction, intermittent immobilization with brace, NSAIDs, antispasmodics, and/or Pain Management referral for injections. I do not see any findings on MRI that would be a source of his right thigh pain. There is no surgical indication in this case. I have advised him to avoid any surgical intervention until absolutely warranted.     Of note, his blood pressure is elevated in clinic today. I have advised him to monitor his blood pressure and to make an appointment with primary care for evaluation. He is not currently being treated for hypertension. I have warned him regarding the risks of uncontrolled HTN particularly in the setting of coagulopathy from frequent use of BC powder.     Gera was seen today for follow-up.    Diagnoses and all orders for this visit:    Facet arthropathy    Degeneration of lumbar/lumbosacral disc without myelopathy            "

## 2017-03-22 NOTE — MR AVS SNAPSHOT
[facility] - Neurosurgery  [address] [facility] [address]  [facility] [address]  Phone: [phone]  Fax: [phone]                  Gera Doran   3/22/2017 12:30 PM   Office Visit    Description:  Male : 1960   Provider:  Rupa Allison NP   Department:  [facility] - Neurosurgery           Reason for Visit     Follow-up                To Do List           Future Appointments        Provider Department Dept Phone    2017 9:00 AM LAB, [facility] [phone]    8/15/2017 9:00 AM Leilani Simon MD [facility] Hematology Oncology [phone]      Goals (5 Years of Data)     None      [facility] On Call     [facility] On Call Nurse Care Line -  Assistance  Registered nurses in the [facility] Center provide clinical advisement, health education, appointment booking, and other advisory services.  Call for this free service at [phone].             Medications           Message regarding Medications     Verify the changes and/or additions to your medication regime listed below are the same as discussed with your clinician today.  If any of these changes or additions are incorrect, please notify your healthcare provider.             Verify that the below list of medications is an accurate representation of the medications you are currently taking.  If none reported, the list may be blank. If incorrect, please contact your healthcare provider. Carry this list with you in case of emergency.           Current Medications     aspirin-salicylamide-caffeine (BC HEADACHE POWDER) 650-195-32 mg Pack Take 1 packet by mouth once daily.    COMBIVENT RESPIMAT  mcg/actuation inhaler     fluticasone (FLONASE) 50 mcg/actuation nasal spray 1 spray by Nasal route as needed.     omeprazole (PRILOSEC) 20 MG capsule Take 20 mg by mouth once daily.    sertraline (ZOLOFT) 100 MG tablet Take 100 mg by mouth once daily. 1/2 daily           Clinical Reference Information          "  Your Vitals Were     BP Pulse Height Weight BMI    142/92 67 5' 11" (1.803 m) 90.8 kg (200 lb 2.8 oz) 27.92 kg/m2      Blood Pressure          Most Recent Value    BP  (!)  142/92      Allergies as of 3/22/2017     Imitrex [Sumatriptan Succinate]    Sulfa (Sulfonamide Antibiotics)    Amoxicillin      Immunizations Administered on Date of Encounter - 3/22/2017     None      Language Assistance Services     ATTENTION: Language assistance services are available, free of charge. Please call [phone].      ATENCIÓN: Si habla talia, tiene a mckeon disposición servicios gratuitos de asistencia lingüística. Llame al [phone].     CHÚ Ý: N?u b?n nói Ti?ng Vi?t, có các d?ch v? h? tr? ngôn ng? mi?n phí dành cho b?n. G?i s? [phone].         [facility] complies with applicable Federal civil rights laws and does not discriminate on the basis of race, color, national origin, age, disability, or sex.        "

## 2017-06-26 ENCOUNTER — TELEPHONE (OUTPATIENT)
Dept: HEMATOLOGY/ONCOLOGY | Facility: CLINIC | Age: 57
End: 2017-06-26

## 2017-06-26 NOTE — TELEPHONE ENCOUNTER
----- Message from Opal Burns sent at 6/26/2017 11:48 AM CDT -----  Contact: [phone]  Patient is requesting a call back from the nurse stated he unable to make 8/15/17 appt and want to reschedule.    Please call the patient upon request at phone number [phone].

## 2017-06-30 ENCOUNTER — TELEPHONE (OUTPATIENT)
Dept: UROLOGY | Facility: CLINIC | Age: 57
End: 2017-06-30

## 2017-06-30 NOTE — TELEPHONE ENCOUNTER
----- Message from Keshia Dhaliwal LPN sent at 6/30/2017 10:12 AM CDT -----  Contact: patient  Came to wrong location can he come over to see fernando. Please contact patient   ----- Message -----  From: Divina Mele  Sent: 6/30/2017   9:00 AM  To: Fernando Salgado Staff    Patient calling in regards to his appt today at 9. He went to [facility] instead of [facility]. He would like to know if he can get squeezed in today. Please advise. Call to pod. No answer.  Call back   Thanks!

## 2017-07-03 ENCOUNTER — LAB VISIT (OUTPATIENT)
Dept: LAB | Facility: HOSPITAL | Age: 57
End: 2017-07-03
Attending: INTERNAL MEDICINE
Payer: MEDICARE

## 2017-07-03 DIAGNOSIS — C91.10 CLL (CHRONIC LYMPHOCYTIC LEUKEMIA): ICD-10-CM

## 2017-07-03 LAB
BASOPHILS # BLD AUTO: ABNORMAL K/UL
BASOPHILS NFR BLD: 0 %
DIFFERENTIAL METHOD: ABNORMAL
EOSINOPHIL # BLD AUTO: ABNORMAL K/UL
EOSINOPHIL NFR BLD: 6 %
ERYTHROCYTE [DISTWIDTH] IN BLOOD BY AUTOMATED COUNT: 13.7 %
HCT VFR BLD AUTO: 44 %
HGB BLD-MCNC: 14.8 G/DL
LYMPHOCYTES # BLD AUTO: ABNORMAL K/UL
LYMPHOCYTES NFR BLD: 68 %
MCH RBC QN AUTO: 31.1 PG
MCHC RBC AUTO-ENTMCNC: 33.6 %
MCV RBC AUTO: 93 FL
MONOCYTES # BLD AUTO: ABNORMAL K/UL
MONOCYTES NFR BLD: 6 %
NEUTROPHILS NFR BLD: 19 %
NEUTS BAND NFR BLD MANUAL: 1 %
OVALOCYTES BLD QL SMEAR: ABNORMAL
PLATELET # BLD AUTO: 182 K/UL
PLATELET BLD QL SMEAR: ABNORMAL
PMV BLD AUTO: 7.5 FL
POIKILOCYTOSIS BLD QL SMEAR: SLIGHT
POLYCHROMASIA BLD QL SMEAR: ABNORMAL
RBC # BLD AUTO: 4.75 M/UL
SMUDGE CELLS BLD QL SMEAR: PRESENT
WBC # BLD AUTO: 19.9 K/UL

## 2017-07-03 PROCEDURE — 85007 BL SMEAR W/DIFF WBC COUNT: CPT

## 2017-07-03 PROCEDURE — 85027 COMPLETE CBC AUTOMATED: CPT

## 2017-07-03 PROCEDURE — 36415 COLL VENOUS BLD VENIPUNCTURE: CPT

## 2017-07-03 PROCEDURE — 85060 BLOOD SMEAR INTERPRETATION: CPT | Mod: ,,, | Performed by: PATHOLOGY

## 2017-07-03 PROCEDURE — 83520 IMMUNOASSAY QUANT NOS NONAB: CPT

## 2017-07-05 LAB
KAPPA LC SER QL IA: 1.29 MG/DL
KAPPA LC/LAMBDA SER IA: 1.5
LAMBDA LC SER QL IA: 0.86 MG/DL
PATH REV BLD -IMP: NORMAL

## 2017-07-12 ENCOUNTER — OFFICE VISIT (OUTPATIENT)
Dept: UROLOGY | Facility: CLINIC | Age: 57
End: 2017-07-12
Payer: MEDICARE

## 2017-07-12 ENCOUNTER — APPOINTMENT (OUTPATIENT)
Dept: LAB | Facility: HOSPITAL | Age: 57
End: 2017-07-12
Attending: UROLOGY
Payer: MEDICARE

## 2017-07-12 ENCOUNTER — OFFICE VISIT (OUTPATIENT)
Dept: HEMATOLOGY/ONCOLOGY | Facility: CLINIC | Age: 57
End: 2017-07-12
Payer: MEDICARE

## 2017-07-12 VITALS
WEIGHT: 202.38 LBS | HEIGHT: 71 IN | RESPIRATION RATE: 18 BRPM | HEART RATE: 63 BPM | SYSTOLIC BLOOD PRESSURE: 149 MMHG | BODY MASS INDEX: 28.33 KG/M2 | DIASTOLIC BLOOD PRESSURE: 99 MMHG

## 2017-07-12 VITALS
RESPIRATION RATE: 18 BRPM | WEIGHT: 203.25 LBS | HEIGHT: 71 IN | TEMPERATURE: 99 F | BODY MASS INDEX: 28.45 KG/M2 | DIASTOLIC BLOOD PRESSURE: 81 MMHG | SYSTOLIC BLOOD PRESSURE: 129 MMHG | HEART RATE: 67 BPM

## 2017-07-12 DIAGNOSIS — R31.29 MICROSCOPIC HEMATURIA: Primary | ICD-10-CM

## 2017-07-12 DIAGNOSIS — F32.A DEPRESSION, UNSPECIFIED DEPRESSION TYPE: ICD-10-CM

## 2017-07-12 DIAGNOSIS — N30.10 INTERSTITIAL CYSTITIS: ICD-10-CM

## 2017-07-12 DIAGNOSIS — N30.10 IC (INTERSTITIAL CYSTITIS): ICD-10-CM

## 2017-07-12 DIAGNOSIS — R60.0 EDEMA OF BOTH LEGS: ICD-10-CM

## 2017-07-12 DIAGNOSIS — C91.10 CLL (CHRONIC LYMPHOCYTIC LEUKEMIA): ICD-10-CM

## 2017-07-12 DIAGNOSIS — R03.0 ELEVATED BLOOD PRESSURE READING IN OFFICE WITHOUT DIAGNOSIS OF HYPERTENSION: Primary | ICD-10-CM

## 2017-07-12 DIAGNOSIS — M79.10 MYALGIA: ICD-10-CM

## 2017-07-12 DIAGNOSIS — R59.0 LYMPHADENOPATHY, CERVICAL: ICD-10-CM

## 2017-07-12 DIAGNOSIS — N28.89 RENAL MASS, RIGHT: ICD-10-CM

## 2017-07-12 DIAGNOSIS — F41.9 ANXIETY: ICD-10-CM

## 2017-07-12 DIAGNOSIS — N40.1 BPH LOC W URIN OBS/LUTS: ICD-10-CM

## 2017-07-12 LAB
BILIRUB SERPL-MCNC: ABNORMAL MG/DL
BLOOD URINE, POC: ABNORMAL
COLOR, POC UA: YELLOW
GLUCOSE UR QL STRIP: ABNORMAL
KETONES UR QL STRIP: ABNORMAL
LEUKOCYTE ESTERASE URINE, POC: ABNORMAL
NITRITE, POC UA: ABNORMAL
PH, POC UA: 6
PROTEIN, POC: ABNORMAL
SPECIFIC GRAVITY, POC UA: 1.01
UROBILINOGEN, POC UA: ABNORMAL

## 2017-07-12 PROCEDURE — 99215 OFFICE O/P EST HI 40 MIN: CPT | Mod: S$PBB,,, | Performed by: INTERNAL MEDICINE

## 2017-07-12 PROCEDURE — 99214 OFFICE O/P EST MOD 30 MIN: CPT | Mod: 25,S$PBB,, | Performed by: UROLOGY

## 2017-07-12 PROCEDURE — 81000 URINALYSIS NONAUTO W/SCOPE: CPT | Mod: PBBFAC,PO | Performed by: UROLOGY

## 2017-07-12 PROCEDURE — 87086 URINE CULTURE/COLONY COUNT: CPT

## 2017-07-12 PROCEDURE — 99213 OFFICE O/P EST LOW 20 MIN: CPT | Mod: PBBFAC,PO | Performed by: UROLOGY

## 2017-07-12 PROCEDURE — 81002 URINALYSIS NONAUTO W/O SCOPE: CPT | Mod: PBBFAC,PO | Performed by: UROLOGY

## 2017-07-12 PROCEDURE — 99213 OFFICE O/P EST LOW 20 MIN: CPT | Mod: PBBFAC,25,27,PO | Performed by: INTERNAL MEDICINE

## 2017-07-12 PROCEDURE — 99999 PR PBB SHADOW E&M-EST. PATIENT-LVL III: CPT | Mod: PBBFAC,,, | Performed by: INTERNAL MEDICINE

## 2017-07-12 PROCEDURE — 88112 CYTOPATH CELL ENHANCE TECH: CPT | Mod: 26,,, | Performed by: PATHOLOGY

## 2017-07-12 PROCEDURE — 99999 PR PBB SHADOW E&M-EST. PATIENT-LVL III: CPT | Mod: PBBFAC,,, | Performed by: UROLOGY

## 2017-07-12 PROCEDURE — 51798 US URINE CAPACITY MEASURE: CPT | Mod: PBBFAC,PO | Performed by: UROLOGY

## 2017-07-12 PROCEDURE — 88112 CYTOPATH CELL ENHANCE TECH: CPT | Performed by: PATHOLOGY

## 2017-07-12 RX ORDER — HYDROCHLOROTHIAZIDE 25 MG/1
25 TABLET ORAL DAILY
Qty: 30 TABLET | Refills: 2 | Status: SHIPPED | OUTPATIENT
Start: 2017-07-12 | End: 2018-03-09

## 2017-07-12 RX ORDER — ALBUTEROL SULFATE 90 UG/1
2 AEROSOL, METERED RESPIRATORY (INHALATION) EVERY 6 HOURS PRN
COMMUNITY
End: 2017-12-20 | Stop reason: SDUPTHER

## 2017-07-12 NOTE — PROGRESS NOTES
Pt here for evaluation of leukocytosis and basophilia with smudge cells on smear    Gera Doran is a 57 y.o.    Visit 57-year-old gentleman was found to have smudge cells on a peripheral smear which was noticed upon ordering a routine follow-up CBC.  He was diagnosed with CLL and is on observation for such. He is free of night sweats, no weight loss and nno fever    He is here to review his labs and check for progression  He is having bouts of anxiety and this in turn cause him to have stomach cramping    His a history of interstitial cystitis with microhematuria.  Currently he reports no symptoms from such  He states he used to take lorazepam and this really helped    Past Medical History:   Diagnosis Date    Arthritis     Asthma     Chemical exposure    Chemical exposure     TO LUNG    GERD (gastroesophageal reflux disease)     IC (interstitial cystitis)     Leukemia     RLS (restless legs syndrome)     Sleep apnea     NO MACHINE         Current Outpatient Prescriptions:     albuterol (PROAIR HFA) 90 mcg/actuation inhaler, Inhale 2 puffs into the lungs every 6 (six) hours as needed for Wheezing. Rescue, Disp: , Rfl:     aspirin-salicylamide-caffeine (BC HEADACHE POWDER) 650-195-32 mg Pack, Take 1 packet by mouth once daily., Disp: , Rfl:     COMBIVENT RESPIMAT  mcg/actuation inhaler, , Disp: , Rfl:     fluticasone (FLONASE) 50 mcg/actuation nasal spray, 1 spray by Nasal route as needed. , Disp: , Rfl:     omeprazole (PRILOSEC) 20 MG capsule, Take 20 mg by mouth once daily., Disp: , Rfl:     sertraline (ZOLOFT) 100 MG tablet, Take 100 mg by mouth once daily. 1/2 daily, Disp: , Rfl:         CONSTITUTIONAL: No fevers, chills, night sweats, wt. loss  SKIN: no rashes or itching  ENT: No headaches, head trauma, vision changes, or eye pain  LYMPH NODES: palpable cervical nodes , left greater than right , left 2 have increased in size  CV: No chest pain, palpitations.   RESP: No dyspnea on  "exertion, cough, wheezing, or hemoptysis  GI: No nausea, emesis, diarrhea, constipation, melena, hematochezia, intermittent pain.   : No dysuria, hematuria, urgency, or frequency   HEME: No easy bruising, bleeding problems  PSYCHIATRIC: No depression, ++anxiety,  nopsychosis, hallucinations.  NEURO: No seizures, memory loss, dizziness or difficulty sleeping  MSK: No arthralgias or joint swelling       BP (!) 149/99   Pulse 63   Resp 18   Ht 5' 11" (1.803 m)   Wt 91.8 kg (202 lb 6.1 oz)   BMI 28.23 kg/m²   Gen: NAD, A and O x3,   Psych: pleasant affect, normal thought process  Eyes:  EOM intact  Nose: Nares patent  OP clear, mucosa patent  Neck: suppple, no JVD, trachea midline, no palpable mass  CHets with no use of accessory muscles  Abd: soft, NTND, + BS, No HSM, no ascites  Extr: No CC RADHA, strength normal, good capillary refill, positive edema BLE   Neuro: steady gait, CNs grossly intact  Skin: intact, no lesions noted  Rheum: No joint swelling      Pathologist Review Peripheral Smear REVIEWED   Comments: Electronically reviewed and signed by:   Regi Yang M.D.   Signed on 01/23/17 at 13:41   PATHOLOGIST INTERPRETATION   WBC- Leukocytosis with an absolute lymphocytosis.  Lymphocytes are   mature and slightly atypical.  Smudge cells are increased.  The   morphology is suggestive of CLL.  Recommend flow cytometry of   peripheral blood ([id number])for confirmation.   RBC- Mild normocytic anemia without significant morphologic   abnormalities.   PLT- Normal in number and morphology.   Interpreted by Regi Yang M.D.      Resulting Agency [facility]       Lab Results   Component Value Date    WBC 19.90 (H) 07/03/2017    HGB 14.8 07/03/2017    HCT 44.0 07/03/2017    MCV 93 07/03/2017     07/03/2017     68 % lymphocytes    CMP  Sodium   Date Value Ref Range Status   01/18/2017 142 136 - 145 mmol/L Final     Potassium   Date Value Ref Range Status   01/18/2017 4.1 3.5 - 5.1 mmol/L Final "     Chloride   Date Value Ref Range Status   01/18/2017 108 95 - 110 mmol/L Final     CO2   Date Value Ref Range Status   01/18/2017 25 23 - 29 mmol/L Final     Glucose   Date Value Ref Range Status   01/18/2017 102 70 - 110 mg/dL Final     BUN, Bld   Date Value Ref Range Status   01/18/2017 15 6 - 20 mg/dL Final     Creatinine   Date Value Ref Range Status   01/18/2017 1.2 0.5 - 1.4 mg/dL Final     Calcium   Date Value Ref Range Status   01/18/2017 8.8 8.7 - 10.5 mg/dL Final     Total Protein   Date Value Ref Range Status   01/18/2017 6.2 6.0 - 8.4 g/dL Final     Albumin   Date Value Ref Range Status   01/18/2017 3.5 3.5 - 5.2 g/dL Final     Total Bilirubin   Date Value Ref Range Status   01/18/2017 0.3 0.1 - 1.0 mg/dL Final     Comment:     For infants and newborns, interpretation of results should be based  on gestational age, weight and in agreement with clinical  observations.  Premature Infant recommended reference ranges:  Up to 24 hours.............<8.0 mg/dL  Up to 48 hours............<12.0 mg/dL  3-5 days..................<15.0 mg/dL  6-29 days.................<15.0 mg/dL       Alkaline Phosphatase   Date Value Ref Range Status   01/18/2017 62 55 - 135 U/L Final     AST   Date Value Ref Range Status   01/18/2017 21 10 - 40 U/L Final     ALT   Date Value Ref Range Status   01/18/2017 17 10 - 44 U/L Final     Anion Gap   Date Value Ref Range Status   01/18/2017 9 8 - 16 mmol/L Final     eGFR if    Date Value Ref Range Status   01/18/2017 >60 >60 mL/min/1.73 m^2 Final     eGFR if non    Date Value Ref Range Status   01/18/2017 >60 >60 mL/min/1.73 m^2 Final     Comment:     Calculation used to obtain the estimated glomerular filtration  rate (eGFR) is the CKD-EPI equation. Since race is unknown   in our information system, the eGFR values for   -American and Non--American patients are given   for each creatinine result.       CT Soft Tissue Neck With Contrast  02/09/2017 None Specified          RESULTS:  CT neck, chest, abdomen, and pelvis with contrast     No comparison     Technique: Axial images through the neck, chest, abdomen, and pelvis were acquired following intravenous administration of 100 mL Omnipaque 350. Multiplanar reformatted images were created.     FINDINGS:     CT NECK: There is mild, symmetric bilateral cervical lymphadenopathy, with a right level III lymph node measuring 11 mm short axis representing one of the larger nodes. The airway is midline and patent. The thyroid, submandibular, and parotid glands are unremarkable. Major vascular structures of the neck are patent. Mild C5-6 degenerative disc changes noted. No suspicious osseous lesions.     CT CHEST: There is a mildly enlarged superior mediastinal lymph node measuring 11 mm located posterior to the trachea just below the thoracic inlet. Otherwise, there are no enlarged hilar, mediastinal, or axillary lymph nodes.     The heart size is normal without pericardial effusion. The thoracic aorta is normal caliber. The lungs are clear. There is no pleural effusion. There are no suspicious osseous lesions.     CT ABDOMEN and PELVIS: There is no venkata abdominal or pelvic lymphadenopathy. There is a prominent 8mm lymph node anterior to the aortic hiatus.     The liver and spleen are normal in size. The gallbladder, pancreas, and adrenal glands are unremarkable.     There are 3 lesions of the right kidney exhibiting density above that to allow for characterization of simple cysts. These measure 2.9, 2.7, and 0.9 cm, respectively. A small cyst of the left kidney is noted.     The small bowel is normal in caliber and appearance. There is moderate diverticulosis coli. Small hiatal hernia is present.     There are no suspicious osseous lesions.  IMPRESSION:      1. Mild cervical lymphadenopathy.  2. Mildly enlarged superior mediastinal lymph node with otherwise no lymphadenopathy within the chest, abdomen, or  pelvis.  3. Indeterminant right renal lesions for which further evaluation with renal ultrasound is recommended.           Electronically signed by: Hayden Marshall MD     Pt had retroperitoneal ultrasound that showed renal mass vs complex cyst for which pt has follow up with Dr Lovell  Beta 2 normal  And LDH normal      Elevated blood pressure reading in office without diagnosis of hypertension  -     hydrochlorothiazide (HYDRODIURIL) 25 MG tablet; Take 1 tablet (25 mg total) by mouth once daily.  Dispense: 30 tablet; Refill: 2    Anxiety    Edema of both legs  -     hydrochlorothiazide (HYDRODIURIL) 25 MG tablet; Take 1 tablet (25 mg total) by mouth once daily.  Dispense: 30 tablet; Refill: 2    CLL (chronic lymphocytic leukemia)  -     CBC auto differential; Future; Expected date: 07/12/2017  -     CMP; Future; Expected date: 07/12/2017    Lymphadenopathy, cervical    IC (interstitial cystitis)    Depression, unspecified depression type    Myalgia      Cont  lorazepam to help with anixiety  Pt given the diagnosis and staging as a stage 3 CLL due to lymphocytosis and anemia  Pt asking about his immune system  Explained this does not need checking at this time   Pt cannot donate blood   He can donate his organs   He does not need IGG  He may eat raw oysters  Blood pressure rising   I explained the palpable lymph node in the cervical region is completely benign although increasing in size and does not meet criteria for pathological node  Once I have the above tests to make further recommendations to the patient back for follow-up  Repeat labs every 6 months for stability  Home Bp sys 120s - 140s  Since his diastolic has been consistently higher than 90 adding low dose diuretic which will also help with edema   Cont  B12 folic acid and iron to daily diet to stimulate marrow  Back off on salt intake   I tried to reassure the patient that chronic leukemias completely different than acute leukemia and I hope I relieve  his worry    Thank you for allowing me to evaluate and participate in the care of this pleasant patient. Please fell free to call me with any questions or concerns.    Warmly,  Leilani Simon MD    This note was dictated with Dragon and briefly proofread. Please excuse any sentences that may be unclear or words misspelled

## 2017-07-13 LAB — BACTERIA UR CULT: NO GROWTH

## 2017-07-16 NOTE — PROGRESS NOTES
OFFICE NOTE    CHIEF COMPLAINT:  Interstitial cystitis with lower urinary tract symptoms, renal   mass.    HISTORY OF PRESENT ILLNESS:  This 57-year-old male returns for routine recheck.    He has a history of interstitial cystitis and has undergone   cystoscopy and bladder hydrodistention under general anesthesia on 01/23/2017,   following which he has been overall doing well, but more recently he has been   noticing increased frequency every hour and nocturia x3 to x4.  He had undergone   a prior cystoscopy and bladder hydrodistention in 2009.  The patient also is   being followed for right renal mass and an MRI of the abdomen done on 03/01/2017   reveals it to be consistent with a right renal hemorrhagic cyst.  There was   also another lesion in the lower pole of the right kidney and recommendation was   to obtain a followup MRI.    The patient also is continuing to be followed by Dr. Simon for his CLL and no   treatment is necessary for it at this time.    MEDICAL HISTORY UPDATE:  Other than the above, he also has been treated for his   hypertension which appears to be stable.    PHYSICAL EXAMINATION:  ABDOMEN:  Soft, benign and nontender.  No masses.  No hernias or organomegaly.  EXTERNAL GENITAL:  Normal phallus with adequate meatus.  Testes descended and   feel normal.  No scrotal masses.  RECTAL:  A 25 to 30 g smooth prostate.  No nodules.  Normal sphincter tone.    Bladder scan revealed 36 mL of postvoid residual.    UA with a trace of blood, a few wbc's and also with pH 6.0.    His most recent PSA was 3.3 on 11/10/2016.    FINAL IMPRESSION:  Interstitial cystitis, lower urinary tract symptoms, right   renal density consistent with apparently hemorrhagic cyst, BPH and lower urinary   tract symptoms, microscopic hematuria.    RECOMMENDATIONS:  Urine for C and S and cytology.  We will place him on a trial   of Myrbetriq 50 mg p.o. daily to see if this alleviates his urinary tract   symptoms.  Otherwise, we  will also obtain an MRI of the abdomen with and without   contrast for further followup evaluation of the renal densities.      MD/KEITH  dd: 07/16/2017 14:10:33 (CDT)  td: 07/16/2017 22:45:53 (CDT)  Doc ID   #[id number]  Job ID #[id number]    CC:

## 2017-07-20 ENCOUNTER — HOSPITAL ENCOUNTER (OUTPATIENT)
Dept: RADIOLOGY | Facility: HOSPITAL | Age: 57
Discharge: HOME | End: 2017-07-20
Attending: UROLOGY
Payer: MEDICARE

## 2017-07-20 DIAGNOSIS — N28.89 RENAL MASS, RIGHT: ICD-10-CM

## 2017-07-20 PROCEDURE — 25500020 PHARM REV CODE 255: Performed by: UROLOGY

## 2017-07-20 PROCEDURE — 74183 MRI ABD W/O CNTR FLWD CNTR: CPT | Mod: 26,,, | Performed by: RADIOLOGY

## 2017-07-20 PROCEDURE — 74183 MRI ABD W/O CNTR FLWD CNTR: CPT | Mod: TC

## 2017-07-20 PROCEDURE — A9585 GADOBUTROL INJECTION: HCPCS | Performed by: UROLOGY

## 2017-07-20 RX ORDER — GADOBUTROL 604.72 MG/ML
INJECTION INTRAVENOUS
Status: DISPENSED
Start: 2017-07-20 | End: 2017-07-20

## 2017-07-20 RX ORDER — GADOBUTROL 604.72 MG/ML
9 INJECTION INTRAVENOUS
Status: COMPLETED | OUTPATIENT
Start: 2017-07-20 | End: 2017-07-20

## 2017-07-20 RX ADMIN — GADOBUTROL 9 ML: 604.72 INJECTION INTRAVENOUS at 08:07

## 2017-07-26 DIAGNOSIS — N40.0 BENIGN PROSTATIC HYPERPLASIA WITHOUT LOWER URINARY TRACT SYMPTOMS: Primary | ICD-10-CM

## 2017-10-19 ENCOUNTER — OFFICE VISIT (OUTPATIENT)
Dept: HEMATOLOGY/ONCOLOGY | Facility: CLINIC | Age: 57
End: 2017-10-19
Payer: MEDICARE

## 2017-10-19 ENCOUNTER — LAB VISIT (OUTPATIENT)
Dept: LAB | Facility: HOSPITAL | Age: 57
End: 2017-10-19
Attending: INTERNAL MEDICINE
Payer: MEDICARE

## 2017-10-19 ENCOUNTER — TELEPHONE (OUTPATIENT)
Dept: PULMONOLOGY | Facility: CLINIC | Age: 57
End: 2017-10-19

## 2017-10-19 VITALS
HEIGHT: 71 IN | WEIGHT: 200.38 LBS | RESPIRATION RATE: 18 BRPM | SYSTOLIC BLOOD PRESSURE: 134 MMHG | TEMPERATURE: 99 F | DIASTOLIC BLOOD PRESSURE: 77 MMHG | BODY MASS INDEX: 28.05 KG/M2 | HEART RATE: 65 BPM

## 2017-10-19 DIAGNOSIS — G47.09 OTHER INSOMNIA: ICD-10-CM

## 2017-10-19 DIAGNOSIS — C91.10 CLL (CHRONIC LYMPHOCYTIC LEUKEMIA): Primary | ICD-10-CM

## 2017-10-19 DIAGNOSIS — J98.4 CHRONIC LUNG DISEASE: Primary | ICD-10-CM

## 2017-10-19 DIAGNOSIS — C91.10 CLL (CHRONIC LYMPHOCYTIC LEUKEMIA): ICD-10-CM

## 2017-10-19 DIAGNOSIS — R06.02 SOB (SHORTNESS OF BREATH): ICD-10-CM

## 2017-10-19 LAB
ALBUMIN SERPL BCP-MCNC: 3.5 G/DL
ALP SERPL-CCNC: 68 U/L
ALT SERPL W/O P-5'-P-CCNC: 14 U/L
ANION GAP SERPL CALC-SCNC: 8 MMOL/L
AST SERPL-CCNC: 13 U/L
BASOPHILS # BLD AUTO: ABNORMAL K/UL
BASOPHILS NFR BLD: 0 %
BILIRUB SERPL-MCNC: 0.3 MG/DL
BUN SERPL-MCNC: 14 MG/DL
CALCIUM SERPL-MCNC: 9.1 MG/DL
CHLORIDE SERPL-SCNC: 106 MMOL/L
CO2 SERPL-SCNC: 28 MMOL/L
CREAT SERPL-MCNC: 1.2 MG/DL
DIFFERENTIAL METHOD: ABNORMAL
EOSINOPHIL # BLD AUTO: ABNORMAL K/UL
EOSINOPHIL NFR BLD: 1 %
ERYTHROCYTE [DISTWIDTH] IN BLOOD BY AUTOMATED COUNT: 12.7 %
EST. GFR  (AFRICAN AMERICAN): >60 ML/MIN/1.73 M^2
EST. GFR  (NON AFRICAN AMERICAN): >60 ML/MIN/1.73 M^2
GLUCOSE SERPL-MCNC: 107 MG/DL
HCT VFR BLD AUTO: 43.4 %
HGB BLD-MCNC: 14.7 G/DL
LYMPHOCYTES # BLD AUTO: ABNORMAL K/UL
LYMPHOCYTES NFR BLD: 71 %
MCH RBC QN AUTO: 31.2 PG
MCHC RBC AUTO-ENTMCNC: 33.8 G/DL
MCV RBC AUTO: 92 FL
MONOCYTES # BLD AUTO: ABNORMAL K/UL
MONOCYTES NFR BLD: 2 %
NEUTROPHILS NFR BLD: 26 %
PLATELET # BLD AUTO: 194 K/UL
PLATELET BLD QL SMEAR: ABNORMAL
PMV BLD AUTO: 7.8 FL
POTASSIUM SERPL-SCNC: 4.2 MMOL/L
PROT SERPL-MCNC: 6.3 G/DL
RBC # BLD AUTO: 4.72 M/UL
SMUDGE CELLS BLD QL SMEAR: PRESENT
SODIUM SERPL-SCNC: 142 MMOL/L
WBC # BLD AUTO: 21.5 K/UL

## 2017-10-19 PROCEDURE — 36415 COLL VENOUS BLD VENIPUNCTURE: CPT

## 2017-10-19 PROCEDURE — 85060 BLOOD SMEAR INTERPRETATION: CPT | Mod: ,,, | Performed by: PATHOLOGY

## 2017-10-19 PROCEDURE — 99215 OFFICE O/P EST HI 40 MIN: CPT | Mod: S$PBB,,, | Performed by: INTERNAL MEDICINE

## 2017-10-19 PROCEDURE — 85027 COMPLETE CBC AUTOMATED: CPT

## 2017-10-19 PROCEDURE — 99999 PR PBB SHADOW E&M-EST. PATIENT-LVL III: CPT | Mod: PBBFAC,,, | Performed by: INTERNAL MEDICINE

## 2017-10-19 PROCEDURE — 99213 OFFICE O/P EST LOW 20 MIN: CPT | Mod: PBBFAC,PO | Performed by: INTERNAL MEDICINE

## 2017-10-19 PROCEDURE — 80053 COMPREHEN METABOLIC PANEL: CPT

## 2017-10-19 PROCEDURE — 85007 BL SMEAR W/DIFF WBC COUNT: CPT

## 2017-10-19 RX ORDER — PANTOPRAZOLE SODIUM 20 MG/1
20 TABLET, DELAYED RELEASE ORAL DAILY
COMMUNITY
End: 2018-01-31 | Stop reason: ALTCHOICE

## 2017-10-19 RX ORDER — LORAZEPAM 1 MG/1
2 TABLET ORAL EVERY 12 HOURS PRN
Qty: 90 TABLET | Refills: 0 | Status: SHIPPED | OUTPATIENT
Start: 2017-10-19 | End: 2018-01-08 | Stop reason: SDUPTHER

## 2017-10-19 RX ORDER — SUCRALFATE 1 G/1
1 TABLET ORAL 4 TIMES DAILY
COMMUNITY
End: 2017-12-20

## 2017-10-19 RX ORDER — IPRATROPIUM BROMIDE AND ALBUTEROL 20; 100 UG/1; UG/1
2 SPRAY, METERED RESPIRATORY (INHALATION) EVERY 6 HOURS PRN
Qty: 1 PACKAGE | Refills: 6 | Status: SHIPPED | OUTPATIENT
Start: 2017-10-19 | End: 2018-03-21

## 2017-10-19 NOTE — TELEPHONE ENCOUNTER
Gave pt appt    ----- Message from Serina Cornejo LPN sent at 10/19/2017  2:01 PM CDT -----  Hi.  Dr. Simon would like this pt to be seen for chronic lung disease please.  Thanks.  Referral in [facility].

## 2017-10-19 NOTE — PROGRESS NOTES
Pt here for evaluation of leukocytosis and basophilia with smudge cells on smear    Gera Doran is a 57 y.o.    Visit 57-year-old gentleman was found to have smudge cells on a peripheral smear which was noticed upon ordering a routine follow-up CBC.  He was diagnosed with CLL and is on observation for such. He is free of night sweats, no weight loss and nno fever  Here for routine follow up of counts    Past Medical History:   Diagnosis Date    Arthritis     Asthma     Chemical exposure    Chemical exposure     TO LUNG    GERD (gastroesophageal reflux disease)     IC (interstitial cystitis)     Leukemia     RLS (restless legs syndrome)     Sleep apnea     NO MACHINE         Current Outpatient Prescriptions:     albuterol (PROAIR HFA) 90 mcg/actuation inhaler, Inhale 2 puffs into the lungs every 6 (six) hours as needed for Wheezing. Rescue, Disp: , Rfl:     aspirin-salicylamide-caffeine (BC HEADACHE POWDER) 650-195-32 mg Pack, Take 1 packet by mouth once daily., Disp: , Rfl:     COMBIVENT RESPIMAT  mcg/actuation inhaler, , Disp: , Rfl:     fluticasone (FLONASE) 50 mcg/actuation nasal spray, 1 spray by Nasal route as needed. , Disp: , Rfl:     hydrochlorothiazide (HYDRODIURIL) 25 MG tablet, Take 1 tablet (25 mg total) by mouth once daily., Disp: 30 tablet, Rfl: 2    pantoprazole (PROTONIX) 20 MG tablet, Take 20 mg by mouth once daily., Disp: , Rfl:     sertraline (ZOLOFT) 100 MG tablet, Take 100 mg by mouth once daily. 1/2 daily, Disp: , Rfl:     sucralfate (CARAFATE) 1 gram tablet, Take 1 g by mouth 4 (four) times daily., Disp: , Rfl:         CONSTITUTIONAL: No fevers, chills, night sweats, wt. loss  SKIN: no rashes or itching  ENT: No headaches, head trauma, vision changes, or eye pain  LYMPH NODES: palpable cervical nodes , left greater than right , one posterior neck aggravates him due to location  CV: No chest pain, palpitations.   RESP: No dyspnea on exertion, cough, wheezing, or  "hemoptysis  GI: No nausea, emesis, diarrhea, constipation, melena, hematochezia, intermittent pain.   : No dysuria, hematuria, urgency, or frequency   HEME: No easy bruising, bleeding problems  PSYCHIATRIC: No depression, ++anxiety,  nopsychosis, hallucinations.  NEURO: No seizures, memory loss, dizziness or difficulty sleeping  MSK: No arthralgias or joint swelling       /77   Pulse 65   Temp 98.5 °F (36.9 °C)   Resp 18   Ht 5' 11" (1.803 m)   Wt 90.9 kg (200 lb 6.4 oz)   BMI 27.95 kg/m²   Gen: NAD, A and O x3,   Psych: pleasant affect, normal thought process  Eyes:  EOM intact  Nose: Nares patent  Neck: suppple, no JVD, trachea midline,palpable lymph nodes and submental glands  Chest with no use of accessory muscles  Abd: soft, NTND, + BS, No HSM, no ascites  Extr: No CC RADHA, strength normal, good capillary refill  Neuro: steady gait, CNs grossly intact  Skin: intact, no lesions noted  Rheum: No joint swelling      Pathologist Review Peripheral Smear REVIEWED   Comments: Electronically reviewed and signed by:   Regi Yang M.D.   Signed on 01/23/17 at 13:41   PATHOLOGIST INTERPRETATION   WBC- Leukocytosis with an absolute lymphocytosis.  Lymphocytes are   mature and slightly atypical.  Smudge cells are increased.  The   morphology is suggestive of CLL.  Recommend flow cytometry of   peripheral blood ([id number])for confirmation.   RBC- Mild normocytic anemia without significant morphologic   abnormalities.   PLT- Normal in number and morphology.   Interpreted by Regi Yang M.D.      Resulting Agency [facility]       Lab Results   Component Value Date    WBC 21.50 (H) 10/19/2017    HGB 14.7 10/19/2017    HCT 43.4 10/19/2017    MCV 92 10/19/2017     10/19/2017         Lcs increased to 15,265    From 13,000    CMP  Sodium   Date Value Ref Range Status   10/19/2017 142 136 - 145 mmol/L Final     Potassium   Date Value Ref Range Status   10/19/2017 4.2 3.5 - 5.1 mmol/L Final     Chloride "   Date Value Ref Range Status   10/19/2017 106 95 - 110 mmol/L Final     CO2   Date Value Ref Range Status   10/19/2017 28 23 - 29 mmol/L Final     Glucose   Date Value Ref Range Status   10/19/2017 107 70 - 110 mg/dL Final     BUN, Bld   Date Value Ref Range Status   10/19/2017 14 6 - 20 mg/dL Final     Creatinine   Date Value Ref Range Status   10/19/2017 1.2 0.5 - 1.4 mg/dL Final     Calcium   Date Value Ref Range Status   10/19/2017 9.1 8.7 - 10.5 mg/dL Final     Total Protein   Date Value Ref Range Status   10/19/2017 6.3 6.0 - 8.4 g/dL Final     Albumin   Date Value Ref Range Status   10/19/2017 3.5 3.5 - 5.2 g/dL Final     Total Bilirubin   Date Value Ref Range Status   10/19/2017 0.3 0.1 - 1.0 mg/dL Final     Comment:     For infants and newborns, interpretation of results should be based  on gestational age, weight and in agreement with clinical  observations.  Premature Infant recommended reference ranges:  Up to 24 hours.............<8.0 mg/dL  Up to 48 hours............<12.0 mg/dL  3-5 days..................<15.0 mg/dL  6-29 days.................<15.0 mg/dL       Alkaline Phosphatase   Date Value Ref Range Status   10/19/2017 68 55 - 135 U/L Final     AST   Date Value Ref Range Status   10/19/2017 13 10 - 40 U/L Final     ALT   Date Value Ref Range Status   10/19/2017 14 10 - 44 U/L Final     Anion Gap   Date Value Ref Range Status   10/19/2017 8 8 - 16 mmol/L Final     eGFR if    Date Value Ref Range Status   10/19/2017 >60 >60 mL/min/1.73 m^2 Final     eGFR if non    Date Value Ref Range Status   10/19/2017 >60 >60 mL/min/1.73 m^2 Final     Comment:     Calculation used to obtain the estimated glomerular filtration  rate (eGFR) is the CKD-EPI equation. Since race is unknown   in our information system, the eGFR values for   -American and Non--American patients are given   for each creatinine result.       CT Soft Tissue Neck With Contrast 02/09/2017 None  Specified          RESULTS:  CT neck, chest, abdomen, and pelvis with contrast     No comparison     Technique: Axial images through the neck, chest, abdomen, and pelvis were acquired following intravenous administration of 100 mL Omnipaque 350. Multiplanar reformatted images were created.     FINDINGS:     CT NECK: There is mild, symmetric bilateral cervical lymphadenopathy, with a right level III lymph node measuring 11 mm short axis representing one of the larger nodes. The airway is midline and patent. The thyroid, submandibular, and parotid glands are unremarkable. Major vascular structures of the neck are patent. Mild C5-6 degenerative disc changes noted. No suspicious osseous lesions.     CT CHEST: There is a mildly enlarged superior mediastinal lymph node measuring 11 mm located posterior to the trachea just below the thoracic inlet. Otherwise, there are no enlarged hilar, mediastinal, or axillary lymph nodes.     The heart size is normal without pericardial effusion. The thoracic aorta is normal caliber. The lungs are clear. There is no pleural effusion. There are no suspicious osseous lesions.     CT ABDOMEN and PELVIS: There is no venkata abdominal or pelvic lymphadenopathy. There is a prominent 8mm lymph node anterior to the aortic hiatus.     The liver and spleen are normal in size. The gallbladder, pancreas, and adrenal glands are unremarkable.     There are 3 lesions of the right kidney exhibiting density above that to allow for characterization of simple cysts. These measure 2.9, 2.7, and 0.9 cm, respectively. A small cyst of the left kidney is noted.     The small bowel is normal in caliber and appearance. There is moderate diverticulosis coli. Small hiatal hernia is present.     There are no suspicious osseous lesions.  IMPRESSION:      1. Mild cervical lymphadenopathy.  2. Mildly enlarged superior mediastinal lymph node with otherwise no lymphadenopathy within the chest, abdomen, or pelvis.  3.  Indeterminant right renal lesions for which further evaluation with renal ultrasound is recommended.           Electronically signed by: Hayden Marshall MD     Pt had retroperitoneal ultrasound that showed renal mass vs complex cyst for which pt has follow up with Dr Lovell  Beta 2 normal  And LDH normal      CLL (chronic lymphocytic leukemia)  -     lorazepam (ATIVAN) 1 MG tablet; Take 2 tablets (2 mg total) by mouth every 12 (twelve) hours as needed for Anxiety.  Dispense: 90 tablet; Refill: 0    SOB (shortness of breath)  -     COMBIVENT RESPIMAT  mcg/actuation inhaler; Inhale 2 puffs into the lungs every 6 (six) hours as needed for Wheezing.  Dispense: 1 Package; Refill: 6    Other insomnia      Pt may have lorazepam for insomnia and anxiety  Explained his disease and staging and that it is not progressing as of yet   Will refer to pulmonary DR Myers   Pt given the diagnosis and staging as a stage 3 CLL due to lymphocytosis and anemia  Explained that lorazepam will not hurt him  He does have chemical lung disease   He agrees to see a specialist  Meds refilled  No scans needed at this time and no treatment nec    Thank you for allowing me to evaluate and participate in the care of this pleasant patient. Please fell free to call me with any questions or concerns.    Warmly,  Leilani Simon MD    This note was dictated with Dragon and briefly proofread. Please excuse any sentences that may be unclear or words misspelled

## 2017-10-24 LAB — PATH REV BLD -IMP: NORMAL

## 2017-12-20 ENCOUNTER — OFFICE VISIT (OUTPATIENT)
Dept: PULMONOLOGY | Facility: CLINIC | Age: 57
End: 2017-12-20
Payer: MEDICARE

## 2017-12-20 VITALS
WEIGHT: 196.44 LBS | OXYGEN SATURATION: 99 % | BODY MASS INDEX: 27.5 KG/M2 | DIASTOLIC BLOOD PRESSURE: 80 MMHG | SYSTOLIC BLOOD PRESSURE: 146 MMHG | HEIGHT: 71 IN | HEART RATE: 60 BPM

## 2017-12-20 DIAGNOSIS — J82.83 EOSINOPHILIC ASTHMA: ICD-10-CM

## 2017-12-20 DIAGNOSIS — J68.3: Primary | ICD-10-CM

## 2017-12-20 DIAGNOSIS — R09.89 CHRONIC SINUS COMPLAINTS: ICD-10-CM

## 2017-12-20 PROCEDURE — 99205 OFFICE O/P NEW HI 60 MIN: CPT | Mod: S$PBB,,, | Performed by: INTERNAL MEDICINE

## 2017-12-20 PROCEDURE — 99999 PR PBB SHADOW E&M-EST. PATIENT-LVL III: CPT | Mod: PBBFAC,,, | Performed by: INTERNAL MEDICINE

## 2017-12-20 PROCEDURE — 99213 OFFICE O/P EST LOW 20 MIN: CPT | Mod: PBBFAC,PO | Performed by: INTERNAL MEDICINE

## 2017-12-20 RX ORDER — AZITHROMYCIN 500 MG/1
TABLET, FILM COATED ORAL
Qty: 3 TABLET | Refills: 3 | Status: SHIPPED | OUTPATIENT
Start: 2017-12-20 | End: 2018-01-31 | Stop reason: ALTCHOICE

## 2017-12-20 RX ORDER — FLUTICASONE PROPIONATE 50 MCG
2 SPRAY, SUSPENSION (ML) NASAL
Qty: 1 BOTTLE | Refills: 11 | Status: SHIPPED | OUTPATIENT
Start: 2017-12-20 | End: 2020-02-11 | Stop reason: SDUPTHER

## 2017-12-20 RX ORDER — MONTELUKAST SODIUM 10 MG/1
10 TABLET ORAL NIGHTLY
Qty: 30 TABLET | Refills: 11 | Status: SHIPPED | OUTPATIENT
Start: 2017-12-20 | End: 2018-03-21 | Stop reason: SDUPTHER

## 2017-12-20 RX ORDER — PREDNISONE 20 MG/1
TABLET ORAL
Qty: 36 TABLET | Refills: 0 | Status: SHIPPED | OUTPATIENT
Start: 2017-12-20 | End: 2019-04-22

## 2017-12-20 RX ORDER — ALBUTEROL SULFATE 90 UG/1
2 AEROSOL, METERED RESPIRATORY (INHALATION) EVERY 6 HOURS PRN
Qty: 18 G | Refills: 11 | Status: SHIPPED | OUTPATIENT
Start: 2017-12-20 | End: 2018-03-12

## 2017-12-20 RX ORDER — IPRATROPIUM BROMIDE AND ALBUTEROL SULFATE 2.5; .5 MG/3ML; MG/3ML
3 SOLUTION RESPIRATORY (INHALATION) EVERY 6 HOURS PRN
Qty: 120 VIAL | Refills: 11 | Status: SHIPPED | OUTPATIENT
Start: 2017-12-20 | End: 2020-02-11 | Stop reason: SDUPTHER

## 2017-12-20 RX ORDER — FLUTICASONE FUROATE AND VILANTEROL 200; 25 UG/1; UG/1
1 POWDER RESPIRATORY (INHALATION) DAILY
Qty: 1 EACH | Refills: 5 | Status: SHIPPED | OUTPATIENT
Start: 2017-12-20 | End: 2018-03-21 | Stop reason: SDUPTHER

## 2017-12-20 NOTE — LETTER
December 20, 2017      Leilani Simon MD  [address]  [place] [address]           [facility]  [address]  [place] [address]  Phone: [phone]  Fax: [phone]          Patient: Gera Doran   MR Number: [id number]   YOB: 1960   Date of Visit: 12/20/2017       Dear Dr. Leilani Simon:    Thank you for referring Gera Doran to me for evaluation. Attached you will find relevant portions of my assessment and plan of care.    If you have questions, please do not hesitate to call me. I look forward to following Gera Doran along with you.    Sincerely,    Luigi Myers MD    Enclosure  CC:  No Recipients    If you would like to receive this communication electronically, please contact [email or web address] or [phone] to request more information on [facility] access.    For providers and/or their staff who would like to refer a patient to [facility], please contact us through our one-stop-shop provider referral line, [facility], at [phone].    If you feel you have received this communication in error or would no longer like to receive these types of communications, please e-mail [email or web address]
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
## 2017-12-20 NOTE — PATIENT INSTRUCTIONS
Need to check immune system - had pneumonia vaccine 2 yrs ago.    Check immune system and eosinophil levels.    Sinus - if stuffy use afrin 10 minutes prior to flonase, use flonase 1-2 each side daily regular.    Azithromycin if needed for infection    singulair may help sinus and lungs.    Asthma- severe persistent. And reactive airways.     breo daily and singulair.   Rescue as needed.      Action plan when albuterol not working well nor lasting long  One daily for 3 may be adequate, avoid high dose as able.  Consider IL5

## 2018-01-08 ENCOUNTER — PATIENT MESSAGE (OUTPATIENT)
Dept: HEMATOLOGY/ONCOLOGY | Facility: CLINIC | Age: 58
End: 2018-01-08

## 2018-01-08 DIAGNOSIS — C91.10 CLL (CHRONIC LYMPHOCYTIC LEUKEMIA): ICD-10-CM

## 2018-01-08 RX ORDER — LORAZEPAM 1 MG/1
2 TABLET ORAL EVERY 12 HOURS PRN
Qty: 90 TABLET | Refills: 0 | Status: SHIPPED | OUTPATIENT
Start: 2018-01-08 | End: 2018-03-29 | Stop reason: SDUPTHER

## 2018-01-08 NOTE — TELEPHONE ENCOUNTER
----- Message from Divina Shabazz sent at 1/8/2018  9:10 AM CST -----  Contact: patient  Patient calling to request a refill for Lorazepam. Please advise.  Call back   Thanks!    [id number] - [place], [place] - [address]  [address]  [place] [address]  Phone: [phone] Fax: [phone]

## 2018-01-29 ENCOUNTER — TELEPHONE (OUTPATIENT)
Dept: UROLOGY | Facility: CLINIC | Age: 58
End: 2018-01-29

## 2018-01-29 NOTE — TELEPHONE ENCOUNTER
----- Message from Felicita Blanton sent at 1/29/2018 12:31 PM CST -----  Contact: Wife  Chayito Doran, wife [phone] calling to speak with office, patient has an appointment on 1/31/18 and is calling to see if he needs to have blood work done before that appointment. Please advise. Thanks.

## 2018-01-29 NOTE — TELEPHONE ENCOUNTER
Returned call to let them know that , yes he needs to have his PSA labs drawn, no answer, message left that the orders are in the computer and can be done at any [facility] facility 2-3 days before appt, no answer, message left with call back number.

## 2018-01-30 ENCOUNTER — LAB VISIT (OUTPATIENT)
Dept: LAB | Facility: HOSPITAL | Age: 58
End: 2018-01-30
Attending: UROLOGY
Payer: MEDICARE

## 2018-01-30 DIAGNOSIS — N40.0 BENIGN PROSTATIC HYPERPLASIA WITHOUT LOWER URINARY TRACT SYMPTOMS: ICD-10-CM

## 2018-01-30 LAB — COMPLEXED PSA SERPL-MCNC: 3.9 NG/ML

## 2018-01-30 PROCEDURE — 36415 COLL VENOUS BLD VENIPUNCTURE: CPT

## 2018-01-30 PROCEDURE — 84153 ASSAY OF PSA TOTAL: CPT

## 2018-01-31 ENCOUNTER — OFFICE VISIT (OUTPATIENT)
Dept: UROLOGY | Facility: CLINIC | Age: 58
End: 2018-01-31
Payer: MEDICARE

## 2018-01-31 VITALS
HEART RATE: 66 BPM | WEIGHT: 196.44 LBS | DIASTOLIC BLOOD PRESSURE: 78 MMHG | TEMPERATURE: 98 F | BODY MASS INDEX: 27.5 KG/M2 | SYSTOLIC BLOOD PRESSURE: 129 MMHG | RESPIRATION RATE: 18 BRPM | HEIGHT: 71 IN

## 2018-01-31 DIAGNOSIS — N40.1 ENLARGED PROSTATE WITH URINARY OBSTRUCTION: ICD-10-CM

## 2018-01-31 DIAGNOSIS — R31.29 MICROSCOPIC HEMATURIA: Primary | ICD-10-CM

## 2018-01-31 DIAGNOSIS — N30.10 INTERSTITIAL CYSTITIS: ICD-10-CM

## 2018-01-31 DIAGNOSIS — R35.1 NOCTURIA: ICD-10-CM

## 2018-01-31 DIAGNOSIS — R35.0 URINARY FREQUENCY: ICD-10-CM

## 2018-01-31 DIAGNOSIS — N13.8 ENLARGED PROSTATE WITH URINARY OBSTRUCTION: ICD-10-CM

## 2018-01-31 LAB
BILIRUB SERPL-MCNC: NORMAL MG/DL
BLOOD URINE, POC: NORMAL
COLOR, POC UA: YELLOW
GLUCOSE UR QL STRIP: NORMAL
KETONES UR QL STRIP: NORMAL
LEUKOCYTE ESTERASE URINE, POC: NORMAL
NITRITE, POC UA: NORMAL
PH, POC UA: 8
PROTEIN, POC: NORMAL
SPECIFIC GRAVITY, POC UA: 1
UROBILINOGEN, POC UA: NORMAL

## 2018-01-31 PROCEDURE — 81002 URINALYSIS NONAUTO W/O SCOPE: CPT | Mod: PBBFAC,PN | Performed by: UROLOGY

## 2018-01-31 PROCEDURE — 81000 URINALYSIS NONAUTO W/SCOPE: CPT | Mod: PBBFAC,PN | Performed by: UROLOGY

## 2018-01-31 PROCEDURE — 99214 OFFICE O/P EST MOD 30 MIN: CPT | Mod: 25,S$PBB,, | Performed by: UROLOGY

## 2018-01-31 PROCEDURE — 99213 OFFICE O/P EST LOW 20 MIN: CPT | Mod: PBBFAC,PN | Performed by: UROLOGY

## 2018-01-31 PROCEDURE — 99999 PR PBB SHADOW E&M-EST. PATIENT-LVL III: CPT | Mod: PBBFAC,,, | Performed by: UROLOGY

## 2018-01-31 RX ORDER — PANTOPRAZOLE SODIUM 40 MG/1
40 TABLET, DELAYED RELEASE ORAL DAILY
COMMUNITY
Start: 2018-01-19

## 2018-01-31 NOTE — PROGRESS NOTES
OFFICE NOTE    CHIEF COMPLAINT:  Interstitial cystitis with lower urinary tract symptoms and   renal hemorrhagic cyst.    HISTORY OF PRESENT ILLNESS:  This 58-year-old male returns for routine recheck.    He has undergone evaluation for lower urinary tract symptoms and cystoscopy and   bladder hydrodistention under general anesthesia on 01/23/2017 reveals findings   consistent with interstitial cystitis.  He had been diagnosed with a similar   finding back in 2009.  He also is being followed for right renal mass and an MRI   most recently obtained on 07/20/2017 reveals findings consistent with   hemorrhagic cyst in the right kidney in addition to other cysts.    The patient did also mentions on today's visit that he continued to have   problems of frequency and nocturia, although his urinary stream is good.  He   also has been experiencing some discomfort and pain in the perineal area.    No other change in general health history since his last visit.    He did mention that he is no longer taking Myrbetriq since he was concerned   about the side effects, although he does not remember what they were   specifically.    PHYSICAL EXAMINATION:  ABDOMEN:  Soft, benign, and nontender.  No masses.  No hernias or organomegaly.  EXTERNAL GENITALIA:  Reveals normal phallus with adequate meatus.  Perineal   examination was performed.  There was no evidence of any masses.  No areas of   tenderness.  RECTAL:  25-30 g smooth prostate.  No nodules.  Normal sphincter tone.    His most recent PSA was 3.9 on 01/30/2018.    UA today negative with pH 8.0.    FINAL IMPRESSION:  Interstitial cystitis, lower urinary tract symptoms of   nocturia, frequency, and perineal pain.    RECOMMENDATIONS:  We will place him on a trial of Toviaz at 4 mg p.o. daily and   the patient will notify us of the response.  We also did discuss consideration   for intravesical Botox injection.  He was given information for him to review   and read and he will  notify us of the response to the Toviaz and if no   improvement, we will subsequently consider intravesical Botox injection.      FARHANA  dd: 01/31/2018 16:51:55 (CST)  td: 02/01/2018 08:59:31 (CST)  Doc ID   #[id number]  Job ID #[id number]    CC:

## 2018-02-23 ENCOUNTER — TELEPHONE (OUTPATIENT)
Dept: PULMONOLOGY | Facility: CLINIC | Age: 58
End: 2018-02-23

## 2018-02-23 ENCOUNTER — TELEPHONE (OUTPATIENT)
Dept: UROLOGY | Facility: CLINIC | Age: 58
End: 2018-02-23

## 2018-02-23 NOTE — TELEPHONE ENCOUNTER
Advised the patient's wife of his appointment for his PFT on 2/28/2018 at 10:00 am and to also do his labs. Chayito (Wife) understands.    ----- Message from Iván Blevins sent at 2/23/2018  9:50 AM CST -----  Contact: wife Chayito   Wife Chayito want to speak with a nurse regarding if patient needs blood work done before his appointment. Please call back at [phone]

## 2018-02-23 NOTE — TELEPHONE ENCOUNTER
----- Message from Jennifer Stone sent at 2/23/2018  9:56 AM CST -----  Please call [phone] ... Advising that the medication did not work / would like to discuss scheduling botox

## 2018-02-23 NOTE — TELEPHONE ENCOUNTER
Returned call and spoke to patient's wife. She states that the new med is not working for the patient and would like to proceed with the botox injection. Message to be given to the MD to advise/schedule, she states to call back on the patient's phone number, she verbally understood.

## 2018-02-28 ENCOUNTER — TELEPHONE (OUTPATIENT)
Dept: UROLOGY | Facility: CLINIC | Age: 58
End: 2018-02-28

## 2018-02-28 ENCOUNTER — HOSPITAL ENCOUNTER (OUTPATIENT)
Dept: RESPIRATORY THERAPY | Facility: HOSPITAL | Age: 58
Discharge: HOME | End: 2018-02-28
Attending: INTERNAL MEDICINE
Payer: MEDICARE

## 2018-02-28 DIAGNOSIS — J68.3: ICD-10-CM

## 2018-02-28 DIAGNOSIS — J82.83 EOSINOPHILIC ASTHMA: ICD-10-CM

## 2018-02-28 PROCEDURE — 94060 EVALUATION OF WHEEZING: CPT

## 2018-02-28 PROCEDURE — 94060 EVALUATION OF WHEEZING: CPT | Mod: 26,,, | Performed by: INTERNAL MEDICINE

## 2018-02-28 PROCEDURE — 94729 DIFFUSING CAPACITY: CPT

## 2018-02-28 PROCEDURE — 94727 GAS DIL/WSHOT DETER LNG VOL: CPT

## 2018-02-28 PROCEDURE — 94727 GAS DIL/WSHOT DETER LNG VOL: CPT | Mod: 26,,, | Performed by: INTERNAL MEDICINE

## 2018-02-28 PROCEDURE — 94729 DIFFUSING CAPACITY: CPT | Mod: 26,,, | Performed by: INTERNAL MEDICINE

## 2018-02-28 NOTE — TELEPHONE ENCOUNTER
Returned call and spoke to patient's wife, procedure date given of 3/12/18 for the cysto w/retro at [facility]. Number given to pre-op to call the week before, she verbally understood.

## 2018-02-28 NOTE — TELEPHONE ENCOUNTER
----- Message from Gema Gomes sent at 2/28/2018 12:13 PM CST -----  Contact: Wife-  Chayito -[phone]  Patient's wife called asking for an update regarding surgery date for the patient.  Thanks!

## 2018-03-01 NOTE — PROCEDURES
Complete pulmonary function study was accomplished February 28, 2018.    Spirometry reveals mild airflow obstruction with no significant bronchodilator response.  The FEV1 is 79% of predicted or 2.98 L.  The FEV1 percent is 69%.    Lung volumes are within normal range.  Diffusion is within normal range and in fact a little bit elevated particularly when corrected for lung volumes, DLCO/VA was 151% of predicted.    The patient has mild airflow obstruction and otherwise pulmonary functions are within normal range with a high DLCO.    Luigi Myers M.D., pulmonary diseases

## 2018-03-05 ENCOUNTER — TELEPHONE (OUTPATIENT)
Dept: PULMONOLOGY | Facility: CLINIC | Age: 58
End: 2018-03-05

## 2018-03-05 NOTE — TELEPHONE ENCOUNTER
Per Dr. Myers patient notified and patient states that the Breo is helping him.    ----- Message from Luigi Myers MD sent at 3/1/2018  5:20 PM CST -----  Notify pulmonary functions were nearly normal.

## 2018-03-06 DIAGNOSIS — N30.10 IC (INTERSTITIAL CYSTITIS): Primary | ICD-10-CM

## 2018-03-06 DIAGNOSIS — R31.21 ASYMPTOMATIC MICROSCOPIC HEMATURIA: ICD-10-CM

## 2018-03-06 RX ORDER — CIPROFLOXACIN 2 MG/ML
400 INJECTION, SOLUTION INTRAVENOUS
Status: CANCELLED | OUTPATIENT
Start: 2018-03-12

## 2018-03-07 NOTE — H&P
Subjective:       Patient ID: Gera Doran is a 58 y.o. male.    Chief Complaint: History of interstitial cystitis since 2009.  He had undergone bladder hydrodistention under general anesthesia January 2017.  He has a recurrence of symptoms is deciding to undergo intravesical Botox injections.    Past Medical History:   Diagnosis Date    Arthritis     Asthma     Chemical exposure    Chemical exposure     TO LUNG    GERD (gastroesophageal reflux disease)     IC (interstitial cystitis)     Leukemia     RLS (restless legs syndrome)     Sleep apnea     NO MACHINE     Past Surgical History:   Procedure Laterality Date    APPENDECTOMY      CYSTOSCOPY      HERNIA REPAIR      SINUS SURGERY      stomach surgery for GERD      TONSILLECTOMY      tonsils       No family history on file.  Social History     Social History    Marital status:      Spouse name: N/A    Number of children: N/A    Years of education: N/A     Social History Main Topics    Smoking status: Never Smoker    Smokeless tobacco: Current User     Types: Chew    Alcohol use Yes      Comment: OCC    Drug use: No    Sexual activity: Not on file     Other Topics Concern    Not on file     Social History Narrative    No narrative on file       Current Outpatient Prescriptions   Medication Sig Dispense Refill    albuterol (PROAIR HFA) 90 mcg/actuation inhaler Inhale 2 puffs into the lungs every 6 (six) hours as needed for Wheezing. Rescue 18 g 11    albuterol-ipratropium 2.5mg-0.5mg/3mL (DUO-NEB) 0.5 mg-3 mg(2.5 mg base)/3 mL nebulizer solution Take 3 mLs by nebulization every 6 (six) hours as needed. 120 vial 11    aspirin-salicylamide-caffeine (BC HEADACHE POWDER) 650-195-32 mg Pack Take 1 packet by mouth once daily.      COMBIVENT RESPIMAT  mcg/actuation inhaler Inhale 2 puffs into the lungs every 6 (six) hours as needed for Wheezing. 1 Package 6    fluticasone (FLONASE) 50 mcg/actuation nasal spray 2 sprays by  Each Nare route as needed. 1 Bottle 11    fluticasone-vilanterol (BREO ELLIPTA) 200-25 mcg/dose DsDv diskus inhaler Inhale 1 puff into the lungs once daily. Controller 1 each 5    hydrochlorothiazide (HYDRODIURIL) 25 MG tablet Take 1 tablet (25 mg total) by mouth once daily. 30 tablet 2    LORazepam (ATIVAN) 1 MG tablet Take 2 tablets (2 mg total) by mouth every 12 (twelve) hours as needed for Anxiety. 90 tablet 0    montelukast (SINGULAIR) 10 mg tablet Take 1 tablet (10 mg total) by mouth every evening. 30 tablet 11    pantoprazole (PROTONIX) 40 MG tablet       predniSONE (DELTASONE) 20 MG tablet 3 for 3 days then 2 for 3 days then one for 3 days and repeat for breathing problems 36 tablet 0    sertraline (ZOLOFT) 100 MG tablet Take 100 mg by mouth once daily. 1/2 daily       No current facility-administered medications for this visit.      Review of patient's allergies indicates:   Allergen Reactions    Imitrex [sumatriptan succinate] Itching    Sulfa (sulfonamide antibiotics) Itching    Amoxicillin Itching       Review of Systems   All other systems reviewed and are negative.      Objective:      There were no vitals filed for this visit.  Physical Exam   Cardiovascular: Normal rate.    Pulmonary/Chest: Effort normal.   Abdominal: Soft.   Genitourinary: Prostate normal and penis normal.            Assessment:       1. IC (interstitial cystitis)    2. Asymptomatic microscopic hematuria         Plan:       Cystoscopy bladder hydrodistention and intravesical Botox injection

## 2018-03-09 ENCOUNTER — HOSPITAL ENCOUNTER (OUTPATIENT)
Dept: PREADMISSION TESTING | Facility: HOSPITAL | Age: 58
Discharge: HOME | End: 2018-03-09
Attending: UROLOGY
Payer: MEDICARE

## 2018-03-09 ENCOUNTER — ANESTHESIA EVENT (OUTPATIENT)
Dept: SURGERY | Facility: HOSPITAL | Age: 58
End: 2018-03-09
Payer: MEDICARE

## 2018-03-09 VITALS — BODY MASS INDEX: 27.58 KG/M2 | HEIGHT: 71 IN | WEIGHT: 197 LBS

## 2018-03-09 PROCEDURE — 99900103 DSU ONLY-NO CHARGE-INITIAL HR (STAT)

## 2018-03-09 NOTE — OR NURSING
Results for MARIPOSA BRUNO (MRN [id number]) as of 3/9/2018 12:52   Ref. Range 3/9/2018 09:01   WBC Latest Ref Range: 3.90 - 12.70 K/uL 24.90 (H)   Informed Caterina Pedraza at Dr. De La Cruz's office.  New orders given to repeat CBC morning of surgery.  Teri Potter

## 2018-03-12 ENCOUNTER — HOSPITAL ENCOUNTER (OUTPATIENT)
Facility: HOSPITAL | Age: 58
Discharge: HOME | End: 2018-03-12
Attending: UROLOGY | Admitting: UROLOGY
Payer: MEDICARE

## 2018-03-12 ENCOUNTER — ANESTHESIA (OUTPATIENT)
Dept: SURGERY | Facility: HOSPITAL | Age: 58
End: 2018-03-12
Payer: MEDICARE

## 2018-03-12 ENCOUNTER — TELEPHONE (OUTPATIENT)
Dept: UROLOGY | Facility: CLINIC | Age: 58
End: 2018-03-12

## 2018-03-12 DIAGNOSIS — N30.10 IC (INTERSTITIAL CYSTITIS): Primary | ICD-10-CM

## 2018-03-12 LAB
BASOPHILS NFR BLD: 0 %
DIFFERENTIAL METHOD: ABNORMAL
EOSINOPHIL NFR BLD: 2 %
ERYTHROCYTE [DISTWIDTH] IN BLOOD BY AUTOMATED COUNT: 13.6 %
HCT VFR BLD AUTO: 40.9 %
HGB BLD-MCNC: 13.6 G/DL
LYMPHOCYTES NFR BLD: 71 %
MCH RBC QN AUTO: 31.4 PG
MCHC RBC AUTO-ENTMCNC: 33.4 G/DL
MCV RBC AUTO: 94 FL
MONOCYTES NFR BLD: 5 %
NEUTROPHILS NFR BLD: 22 %
PLATELET # BLD AUTO: 173 K/UL
PLATELET BLD QL SMEAR: ABNORMAL
PMV BLD AUTO: 7.6 FL
RBC # BLD AUTO: 4.35 M/UL
SMUDGE CELLS BLD QL SMEAR: PRESENT
WBC # BLD AUTO: 25 K/UL

## 2018-03-12 PROCEDURE — 25000003 PHARM REV CODE 250: Performed by: UROLOGY

## 2018-03-12 PROCEDURE — 63600175 PHARM REV CODE 636 W HCPCS: Performed by: UROLOGY

## 2018-03-12 PROCEDURE — 63600175 PHARM REV CODE 636 W HCPCS: Performed by: NURSE ANESTHETIST, CERTIFIED REGISTERED

## 2018-03-12 PROCEDURE — 99900104 DSU ONLY-NO CHARGE-EA ADD'L HR (STAT): Performed by: UROLOGY

## 2018-03-12 PROCEDURE — 71000033 HC RECOVERY, INTIAL HOUR: Performed by: UROLOGY

## 2018-03-12 PROCEDURE — D9220A PRA ANESTHESIA: Mod: ANES,,, | Performed by: ANESTHESIOLOGY

## 2018-03-12 PROCEDURE — 25000003 PHARM REV CODE 250: Performed by: ANESTHESIOLOGY

## 2018-03-12 PROCEDURE — 37000008 HC ANESTHESIA 1ST 15 MINUTES: Performed by: UROLOGY

## 2018-03-12 PROCEDURE — 85007 BL SMEAR W/DIFF WBC COUNT: CPT

## 2018-03-12 PROCEDURE — 36000707: Performed by: UROLOGY

## 2018-03-12 PROCEDURE — 27200651 HC AIRWAY, LMA: Performed by: NURSE ANESTHETIST, CERTIFIED REGISTERED

## 2018-03-12 PROCEDURE — 85027 COMPLETE CBC AUTOMATED: CPT

## 2018-03-12 PROCEDURE — 37000009 HC ANESTHESIA EA ADD 15 MINS: Performed by: UROLOGY

## 2018-03-12 PROCEDURE — 71000015 HC POSTOP RECOV 1ST HR: Performed by: UROLOGY

## 2018-03-12 PROCEDURE — 99900103 DSU ONLY-NO CHARGE-INITIAL HR (STAT): Performed by: UROLOGY

## 2018-03-12 PROCEDURE — 36415 COLL VENOUS BLD VENIPUNCTURE: CPT

## 2018-03-12 PROCEDURE — 52260 CYSTOSCOPY AND TREATMENT: CPT | Mod: ,,, | Performed by: UROLOGY

## 2018-03-12 PROCEDURE — 52287 CYSTOSCOPY CHEMODENERVATION: CPT | Mod: 51,,, | Performed by: UROLOGY

## 2018-03-12 PROCEDURE — D9220A PRA ANESTHESIA: Mod: CRNA,,, | Performed by: NURSE ANESTHETIST, CERTIFIED REGISTERED

## 2018-03-12 PROCEDURE — 36000706: Performed by: UROLOGY

## 2018-03-12 RX ORDER — LIDOCAINE HCL/PF 100 MG/5ML
SYRINGE (ML) INTRAVENOUS
Status: DISCONTINUED | OUTPATIENT
Start: 2018-03-12 | End: 2018-03-12

## 2018-03-12 RX ORDER — HYDROCODONE BITARTRATE AND ACETAMINOPHEN 5; 325 MG/1; MG/1
1 TABLET ORAL
Qty: 20 TABLET | Refills: 0 | Status: SHIPPED | OUTPATIENT
Start: 2018-03-12 | End: 2018-03-27 | Stop reason: ALTCHOICE

## 2018-03-12 RX ORDER — HYDROCODONE BITARTRATE AND ACETAMINOPHEN 5; 325 MG/1; MG/1
1 TABLET ORAL EVERY 4 HOURS PRN
Status: CANCELLED | OUTPATIENT
Start: 2018-03-12

## 2018-03-12 RX ORDER — HYDROMORPHONE HYDROCHLORIDE 2 MG/ML
0.2 INJECTION, SOLUTION INTRAMUSCULAR; INTRAVENOUS; SUBCUTANEOUS EVERY 5 MIN PRN
Status: DISCONTINUED | OUTPATIENT
Start: 2018-03-12 | End: 2018-03-12 | Stop reason: HOSPADM

## 2018-03-12 RX ORDER — MIDAZOLAM HYDROCHLORIDE 1 MG/ML
INJECTION, SOLUTION INTRAMUSCULAR; INTRAVENOUS
Status: DISCONTINUED | OUTPATIENT
Start: 2018-03-12 | End: 2018-03-12

## 2018-03-12 RX ORDER — MEPERIDINE HYDROCHLORIDE 25 MG/ML
12.5 INJECTION INTRAMUSCULAR; INTRAVENOUS; SUBCUTANEOUS ONCE AS NEEDED
Status: DISCONTINUED | OUTPATIENT
Start: 2018-03-12 | End: 2018-03-12 | Stop reason: HOSPADM

## 2018-03-12 RX ORDER — PHENAZOPYRIDINE HYDROCHLORIDE 100 MG/1
200 TABLET, FILM COATED ORAL
Qty: 20 TABLET | Refills: 0 | Status: SHIPPED | OUTPATIENT
Start: 2018-03-12 | End: 2018-03-27 | Stop reason: ALTCHOICE

## 2018-03-12 RX ORDER — DIPHENHYDRAMINE HYDROCHLORIDE 50 MG/ML
25 INJECTION INTRAMUSCULAR; INTRAVENOUS EVERY 6 HOURS PRN
Status: DISCONTINUED | OUTPATIENT
Start: 2018-03-12 | End: 2018-03-12 | Stop reason: HOSPADM

## 2018-03-12 RX ORDER — KETOROLAC TROMETHAMINE 30 MG/ML
INJECTION, SOLUTION INTRAMUSCULAR; INTRAVENOUS
Status: DISCONTINUED | OUTPATIENT
Start: 2018-03-12 | End: 2018-03-12

## 2018-03-12 RX ORDER — SODIUM CHLORIDE, SODIUM LACTATE, POTASSIUM CHLORIDE, CALCIUM CHLORIDE 600; 310; 30; 20 MG/100ML; MG/100ML; MG/100ML; MG/100ML
INJECTION, SOLUTION INTRAVENOUS CONTINUOUS
Status: DISCONTINUED | OUTPATIENT
Start: 2018-03-12 | End: 2018-03-12 | Stop reason: HOSPADM

## 2018-03-12 RX ORDER — FENTANYL CITRATE 50 UG/ML
25 INJECTION, SOLUTION INTRAMUSCULAR; INTRAVENOUS EVERY 5 MIN PRN
Status: DISCONTINUED | OUTPATIENT
Start: 2018-03-12 | End: 2018-03-12 | Stop reason: HOSPADM

## 2018-03-12 RX ORDER — CIPROFLOXACIN 500 MG/1
500 TABLET ORAL EVERY 12 HOURS
Qty: 14 TABLET | Refills: 0 | Status: SHIPPED | OUTPATIENT
Start: 2018-03-12 | End: 2018-03-21 | Stop reason: SDUPTHER

## 2018-03-12 RX ORDER — PROPOFOL 10 MG/ML
VIAL (ML) INTRAVENOUS
Status: DISCONTINUED | OUTPATIENT
Start: 2018-03-12 | End: 2018-03-12

## 2018-03-12 RX ORDER — OXYCODONE HYDROCHLORIDE 5 MG/1
5 TABLET ORAL ONCE AS NEEDED
Status: DISCONTINUED | OUTPATIENT
Start: 2018-03-13 | End: 2018-03-12 | Stop reason: HOSPADM

## 2018-03-12 RX ORDER — SODIUM CHLORIDE 0.9 % (FLUSH) 0.9 %
3 SYRINGE (ML) INJECTION
Status: DISCONTINUED | OUTPATIENT
Start: 2018-03-12 | End: 2018-03-12 | Stop reason: HOSPADM

## 2018-03-12 RX ORDER — LIDOCAINE HYDROCHLORIDE 10 MG/ML
1 INJECTION, SOLUTION EPIDURAL; INFILTRATION; INTRACAUDAL; PERINEURAL ONCE
Status: COMPLETED | OUTPATIENT
Start: 2018-03-12 | End: 2018-03-12

## 2018-03-12 RX ORDER — FENTANYL CITRATE 50 UG/ML
INJECTION, SOLUTION INTRAMUSCULAR; INTRAVENOUS
Status: DISCONTINUED | OUTPATIENT
Start: 2018-03-12 | End: 2018-03-12

## 2018-03-12 RX ORDER — ONDANSETRON 2 MG/ML
4 INJECTION INTRAMUSCULAR; INTRAVENOUS ONCE
Status: DISCONTINUED | OUTPATIENT
Start: 2018-03-12 | End: 2018-03-12 | Stop reason: HOSPADM

## 2018-03-12 RX ORDER — DEXAMETHASONE SODIUM PHOSPHATE 4 MG/ML
INJECTION, SOLUTION INTRA-ARTICULAR; INTRALESIONAL; INTRAMUSCULAR; INTRAVENOUS; SOFT TISSUE
Status: DISCONTINUED | OUTPATIENT
Start: 2018-03-12 | End: 2018-03-12

## 2018-03-12 RX ORDER — CIPROFLOXACIN 2 MG/ML
400 INJECTION, SOLUTION INTRAVENOUS
Status: COMPLETED | OUTPATIENT
Start: 2018-03-12 | End: 2018-03-12

## 2018-03-12 RX ORDER — LIDOCAINE HYDROCHLORIDE 20 MG/ML
JELLY TOPICAL
Status: DISCONTINUED | OUTPATIENT
Start: 2018-03-12 | End: 2018-03-12 | Stop reason: HOSPADM

## 2018-03-12 RX ORDER — PHENAZOPYRIDINE HYDROCHLORIDE 200 MG/1
200 TABLET, FILM COATED ORAL ONCE
Status: COMPLETED | OUTPATIENT
Start: 2018-03-12 | End: 2018-03-12

## 2018-03-12 RX ORDER — ONDANSETRON HYDROCHLORIDE 2 MG/ML
INJECTION, SOLUTION INTRAMUSCULAR; INTRAVENOUS
Status: DISCONTINUED | OUTPATIENT
Start: 2018-03-12 | End: 2018-03-12

## 2018-03-12 RX ADMIN — SODIUM CHLORIDE, SODIUM LACTATE, POTASSIUM CHLORIDE, AND CALCIUM CHLORIDE: .6; .31; .03; .02 INJECTION, SOLUTION INTRAVENOUS at 08:03

## 2018-03-12 RX ADMIN — FENTANYL CITRATE 50 MCG: 50 INJECTION, SOLUTION INTRAMUSCULAR; INTRAVENOUS at 09:03

## 2018-03-12 RX ADMIN — MIDAZOLAM 2 MG: 1 INJECTION INTRAMUSCULAR; INTRAVENOUS at 09:03

## 2018-03-12 RX ADMIN — PROPOFOL 200 MG: 10 INJECTION, EMULSION INTRAVENOUS at 09:03

## 2018-03-12 RX ADMIN — LIDOCAINE HYDROCHLORIDE 10 MG: 10 INJECTION, SOLUTION EPIDURAL; INFILTRATION; INTRACAUDAL; PERINEURAL at 07:03

## 2018-03-12 RX ADMIN — ONDANSETRON 4 MG: 2 INJECTION, SOLUTION INTRAMUSCULAR; INTRAVENOUS at 09:03

## 2018-03-12 RX ADMIN — DEXAMETHASONE SODIUM PHOSPHATE 4 MG: 4 INJECTION, SOLUTION INTRAMUSCULAR; INTRAVENOUS at 09:03

## 2018-03-12 RX ADMIN — PHENAZOPYRIDINE HYDROCHLORIDE 200 MG: 200 TABLET ORAL at 10:03

## 2018-03-12 RX ADMIN — KETOROLAC TROMETHAMINE 30 MG: 30 INJECTION, SOLUTION INTRAMUSCULAR; INTRAVENOUS at 09:03

## 2018-03-12 RX ADMIN — LIDOCAINE HYDROCHLORIDE 50 MG: 20 INJECTION, SOLUTION INTRAVENOUS at 09:03

## 2018-03-12 RX ADMIN — CIPROFLOXACIN 400 MG: 2 INJECTION, SOLUTION INTRAVENOUS at 09:03

## 2018-03-12 RX ADMIN — SODIUM CHLORIDE, SODIUM GLUCONATE, SODIUM ACETATE, POTASSIUM CHLORIDE, MAGNESIUM CHLORIDE, SODIUM PHOSPHATE, DIBASIC, AND POTASSIUM PHOSPHATE: .53; .5; .37; .037; .03; .012; .00082 INJECTION, SOLUTION INTRAVENOUS at 08:03

## 2018-03-12 NOTE — ANESTHESIA POSTPROCEDURE EVALUATION
"Anesthesia Post Evaluation    Patient: Gera Doran    Procedure(s) Performed: Procedure(s) (LRB):  CYSTOSCOPY WITH HYDRODISTENSION (N/A)  INJECTION-BOTOX (N/A)    Final Anesthesia Type: general  Patient location during evaluation: PACU  Patient participation: Yes- Able to Participate  Level of consciousness: awake and alert and oriented  Post-procedure vital signs: reviewed and stable  Pain management: adequate  Airway patency: patent  PONV status at discharge: No PONV  Anesthetic complications: no      Cardiovascular status: blood pressure returned to baseline  Respiratory status: unassisted, spontaneous ventilation and room air  Hydration status: euvolemic  Follow-up not needed.        Visit Vitals  /81 (BP Location: Left arm, Patient Position: Lying)   Pulse 60   Temp 36.5 °C (97.7 °F) (Oral)   Ht 5' 11" (1.803 m)   Wt 88.9 kg (196 lb)   SpO2 100%   BMI 27.34 kg/m²       Pain/Pipo Score: Pain Assessment Performed: Yes (3/12/2018  7:19 AM)  Presence of Pain: complains of pain/discomfort (3/12/2018  7:19 AM)      "

## 2018-03-12 NOTE — TRANSFER OF CARE
"Anesthesia Transfer of Care Note    Patient: Gera Doran    Procedure(s) Performed: Procedure(s) (LRB):  CYSTOSCOPY WITH HYDRODISTENSION (N/A)  INJECTION-BOTOX (N/A)    Patient location: PACU    Anesthesia Type: general    Transport from OR: Transported from OR on 2-3 L/min O2 by NC with adequate spontaneous ventilation    Post pain: adequate analgesia    Post assessment: no apparent anesthetic complications and tolerated procedure well    Post vital signs: stable    Level of consciousness: awake, alert and oriented    Nausea/Vomiting: no nausea/vomiting    Complications: none    Transfer of care protocol was followed      Last vitals:   Visit Vitals  /81 (BP Location: Left arm, Patient Position: Lying)   Pulse 60   Temp 36.5 °C (97.7 °F) (Oral)   Ht 5' 11" (1.803 m)   Wt 88.9 kg (196 lb)   SpO2 100%   BMI 27.34 kg/m²     "

## 2018-03-12 NOTE — PLAN OF CARE
Arrived with spouse ambulatory in no present distress, plan of care was reviewed, warm blankets provided, and valuables per spouse

## 2018-03-12 NOTE — PLAN OF CARE
Dr valderrama released pt from pacu to phase 2  pyridium given  200 mg po as ordered no nausea no co of burning no co Due to void

## 2018-03-12 NOTE — TELEPHONE ENCOUNTER
Patient's wife called, she states the patient had urinated a little before he left the hospital, and then they made a stop and he urinated a small amount. Now he has drank some water and some ice tea and is unable to urinate. Informed to go back to the hospital ER, where the MD will be on call to advise, she verbally understood.

## 2018-03-12 NOTE — PLAN OF CARE
Discharge instructions given to pt and wife who voice understanding.  Taking po fluids without nausea.  Denies pain.  Voiding clear blood tinged urine without difficulty

## 2018-03-12 NOTE — OP NOTE
DATE OF PROCEDURE:  03/12/2018.    PREOPERATIVE DIAGNOSES:  Interstitial cystitis and overactive bladder.    POSTOPERATIVE DIAGNOSES:  Interstitial cystitis and overactive bladder.    PROCEDURES PERFORMED:  Cystourethroscopy, bladder hydrodistention under general   anesthesia and intravesical Botox injections.    SURGEON:  Damian De La Cruz M.D.    ANESTHESIA:  General.    PROCEDURES:  The patient was brought to the Cystoscopy Suite and after adequate   induction of general anesthesia, he was placed in lithotomy position.  Genitalia   were prepped and draped in usual manner.  A 22-Telugu cystoscope sheath with a   foroblique lens and video camera was then inserted under direct vision into the   anterior urethra.  Examination of the anterior urethra was unremarkable.  The   instrument was then advanced towards the prostatic urethra where the   verumontanum was encountered.  There was some moderate enlarged lateral lobes of   the prostate, but no clear evidence of any obstruction and there was also some   elevation of median bar.  The interior of the bladder was then examined.  The   trigone was symmetrically configurated.  Both orifices were slit-like and had   clear efflux.  Examination of the bladder mucosa did reveal some mild   trabeculation, but no diverticula, no cellules, no lesions and no hemorrhagic   sites.  The bladder was then distended with irrigating solution and able to   tolerate a capacity of approximately 500 mL.  It was then drained and   reexamined.  There were no other significant changes encountered.  More   specifically, there was no obvious evidence of any petechial hemorrhagic   glomerulizations that could be seen.  Bladder hydrodistention was carried out a   second time and this time an additional 100 mL of irrigating fluid was   introduced into the bladder for a total of approximately 600 mL  It was then   drained and reexamined and similar findings were again encountered.  A 100 units   of  Botox in 10 mL of sterile saline were then connected to the delivery needle.    It was introduced through the working channel of the cystoscope.   0.5 mL of   the Botox solution was injected with the needle into the bladder wall.  Initially,   beginning on the right lateral wall a row of 10 injections were introduced   across the right lateral to the left lateral wall approximately 1 to 1.5 cm   apart.  A second layer of 0.5 mL injections of the Botox solution was then made   in another additional layer higher up in the bladder wall extending from the   left lateral wall to the right lateral wall for a total of 20 injections and one   final 21st injection was placed into the area of the posterior wall of the   bladder.  The procedure was well tolerated.  The bladder was then drained,   instruments removed.  A digital rectal examination did not palpate any masses or   any abnormalities of the prostate, which had a smooth surface to it and the   patient having tolerated the procedure well, was then transferred to the   Recovery Room in stable condition.      MD/IN  dd: 03/12/2018 10:03:18 (CDT)  td: 03/12/2018 15:49:19 (YAMIL)  Doc ID   #[id number]  Job ID #[id number]    CC:

## 2018-03-12 NOTE — DISCHARGE INSTRUCTIONS
Before Your Procedure:   Prior to cystoscopy, a urinalysis and urine culture must be obtained to ensure there is no infection.   Please let your doctor know if you are on a blood thinner, such as Aspirin, Ecotrin, Aggrenox, Advil, Pletal,  Plavix, Coumadin/Warfarin, Lovenox, Xarelto or Pradaxa because these medications must be stopped prior to the  procedure at least seven days prior to your procedure.   On the day of your procedure, please let the doctor know if you have symptoms of a urinary tract infection such  as increased frequency of urination, burning or pain with urination, cloudy or foul smelling urine or blood in the  urine. If you have a urine infection, we may need to postpone the procedure.      After Your Procedure:   After your procedure, you may be allowed to go about your day. If you've been given sedation or general  anesthesia, you may be asked to remain in a recovery area to let the effects of the medication wear off .   A decrease or absence of bladder overactivity can be seen within 7 days of the injection. This can last up to 6  months after the first injection and reports of up to 12 months after subsequent injections.   Common side effects include: blood in the urine, burning during urination, frequent urination 1-2 days.   Drinking plenty of water may help to reduce bleeding and discomfort after your procedure. Taking a warm bath or  holding a warm, damp washcloth over the urethral opening may also help to relieve your discomfort.   Medication may be prescribed to relieve burning with urination which may cause discoloration of the urine.   An antibiotic may be prescribed to prevent infection.   Contact your doctor immediately if you experience any of the following symptoms: trouble urinating, heavy  bleeding, fever greater        Cystoscopy    Cystoscopy is a procedure that lets your doctor look directly inside your urethra and bladder. It can be used to:  · Help diagnose a  problem with your urethra, bladder, or kidneys.  · Take a sample (biopsy) of bladder or urethral tissue.  · Treat certain problems (such as removing kidney stones).  · Place a stent to bypass an obstruction.  · Take special X-rays of the kidneys.  Based on the findings, your doctor may recommend other tests or treatments.  What is a cystoscope?  A cystoscope is a telescope-like instrument that contains lenses and fiberoptics (small glass wires that make bright light). The cystoscope may be straight and rigid, or flexible to bend around curves in the urethra. The doctor may look directly into the cystoscope, or project the image onto a monitor.  Getting ready  · Ask your doctor if you should stop taking any medicines before the procedure.  · Ask whether you should avoid eating or drinking anything after midnight before the procedure.  · Follow any other instructions your doctor gives you.  Tell your doctor before the exam if you:  · Take any medicines, such as aspirin or blood thinners  · Have allergies to any medicines  · Are pregnant   The procedure  Cystoscopy is done in the doctors office, surgery center, or hospital. The doctor and a nurse are present during the procedure. It takes only a few minutes, longer if a biopsy, X-ray, or treatment needs to be done.  During the procedure:  · You lie on an exam table on your back, knees bent and legs apart. You are covered with a drape.  · Your urethra and the area around it are washed. Anesthetic jelly may be applied to numb the urethra. Other pain medicine is usually not needed. In some cases, you may be offered a mild sedative to help you relax. If a more extensive procedure is to be done, such as a biopsy or kidney stone removal, general anesthesia may be needed.  · The cystoscope is inserted. A sterile fluid is put into the bladder to expand it. You may feel pressure from this fluid.  · When the procedure is done, the cystoscope is removed.  After the procedure  If  you had a sedative, general anesthesia, or spinal anesthesia, you must have someone drive you home. Once youre home:  · Drink plenty of fluids.  · You may have burning or light bleeding when you urinate--this is normal.  · Medicines may be prescribed to ease any discomfort or prevent infection. Take these as directed.  · Call your doctor if you have heavy bleeding or blood clots, burning that lasts more than a day, a fever over 100°F  (38° C), or trouble urinating.  Date Last Reviewed: 1/1/2017 © 2000-2017 [employer]. [address] [place] [address]. All rights reserved. This information is not intended as a substitute for professional medical care. Always follow your healthcare professional's instructions.      General Information:    1.  Do not drink alcoholic beverages including beer for 24 hours or as long as you are on pain medication..  2.  Do not drive a motor vehicle, operate machinery or power tools, or signs legal papers for 24 hours or as long as you are on pain medication.   3.  You may experience light-headedness, dizziness, and sleepiness following surgery. Please do not stay alone. A responsible adult should be with you for this 24 hour period.  4.  Go home and rest.    5. Progress slowly to a normal diet unless instructed.  Otherwise, begin with liquids such as soft drinks, then soup and crackers working up to solid foods. Drink plenty of nonalcoholic fluids.  6.  Certain anesthetics and pain medications produce nausea and vomiting in certain       individuals. If nausea becomes a problem at home, call you doctor.    7. A nurse will be calling you sometime after surgery. Do not be alarmed. This is our way of finding out how you are doing.    8. Several times every hour while you are awake, take 2-3 deep breaths and cough. If you had stomach surgery hold a pillow or rolled towel firmly against your stomach before you cough. This will help with any pain the cough might  "cause.  9. Several times every hour while you are awake, pump and flex your feet 5-6 times and do foot circles. This will help prevent blood clots.    10.Call your doctor for severe pain, bleeding, fever, or signs or symptoms of infection (pain, swelling, redness, foul odor, drainage).      Discharge Instructions: After Your Surgery/Procedure  Youve just had surgery. During surgery you were given medicine called anesthesia to keep you relaxed and free of pain. After surgery you may have some pain or nausea. This is common. Here are some tips for feeling better and getting well after surgery.     Stay on schedule with your medication.   Going home  Your doctor or nurse will show you how to take care of yourself when you go home. He or she will also answer your questions. Have an adult family member or friend drive you home.      For your safety we recommend these precaution for the first 24 hours after your procedure:  · Do not drive or use heavy equipment.  · Do not make important decisions or sign legal papers.  · Do not drink alcohol.  · Have someone stay with you, if needed. He or she can watch for problems and help keep you safe.  · Your concentration, balance, coordination, and judgement may be impaired for many hours after anesthesia.  Use caution when ambulating or standing up.     · You may feel weak and "washed out" after anesthesia and surgery.      Subtle residual effects of general anesthesia or sedation with regional / local anesthesia can last more than 24 hours.  Rest for the remainder of the day or longer if your Doctor/Surgeon has advised you to do so.  Although you may feel normal within the first 24 hours, your reflexes and mental ability may be impaired without you realizing it.  You may feel dizzy, lightheaded or sleepy for 24 hours or longer.      Be sure to go to all follow-up visits with your doctor. And rest after your surgery for as long as your doctor tells you to.  Coping with pain  If " you have pain after surgery, pain medicine will help you feel better. Take it as told, before pain becomes severe. Also, ask your doctor or pharmacist about other ways to control pain. This might be with heat, ice, or relaxation. And follow any other instructions your surgeon or nurse gives you.  Tips for taking pain medicine  To get the best relief possible, remember these points:  · Pain medicines can upset your stomach. Taking them with a little food may help.  · Most pain relievers taken by mouth need at least 20 to 30 minutes to start to work.  · Taking medicine on a schedule can help you remember to take it. Try to time your medicine so that you can take it before starting an activity. This might be before you get dressed, go for a walk, or sit down for dinner.  · Constipation is a common side effect of pain medicines. Call your doctor before taking any medicines such as laxatives or stool softeners to help ease constipation. Also ask if you should skip any foods. Drinking lots of fluids and eating foods such as fruits and vegetables that are high in fiber can also help. Remember, do not take laxatives unless your surgeon has prescribed them.  · Drinking alcohol and taking pain medicine can cause dizziness and slow your breathing. It can even be deadly. Do not drink alcohol while taking pain medicine.  · Pain medicine can make you react more slowly to things. Do not drive or run machinery while taking pain medicine.  Your health care provider may tell you to take acetaminophen to help ease your pain. Ask him or her how much you are supposed to take each day. Acetaminophen or other pain relievers may interact with your prescription medicines or other over-the-counter (OTC) drugs. Some prescription medicines have acetaminophen and other ingredients. Using both prescription and OTC acetaminophen for pain can cause you to overdose. Read the labels on your OTC medicines with care. This will help you to clearly know  the list of ingredients, how much to take, and any warnings. It may also help you not take too much acetaminophen. If you have questions or do not understand the information, ask your pharmacist or health care provider to explain it to you before you take the OTC medicine.  Managing nausea  Some people have an upset stomach after surgery. This is often because of anesthesia, pain, or pain medicine, or the stress of surgery. These tips will help you handle nausea and eat healthy foods as you get better. If you were on a special food plan before surgery, ask your doctor if you should follow it while you get better. These tips may help:  · Do not push yourself to eat. Your body will tell you when to eat and how much.  · Start off with clear liquids and soup. They are easier to digest.  · Next try semi-solid foods, such as mashed potatoes, applesauce, and gelatin, as you feel ready.  · Slowly move to solid foods. Dont eat fatty, rich, or spicy foods at first.  · Do not force yourself to have 3 large meals a day. Instead eat smaller amounts more often.  · Take pain medicines with a small amount of solid food, such as crackers or toast, to avoid nausea.     Call your surgeon if  · You still have pain an hour after taking medicine. The medicine may not be strong enough.  · You feel too sleepy, dizzy, or groggy. The medicine may be too strong.  · You have side effects like nausea, vomiting, or skin changes, such as rash, itching, or hives.       If you have obstructive sleep apnea  You were given anesthesia medicine during surgery to keep you comfortable and free of pain. After surgery, you may have more apnea spells because of this medicine and other medicines you were given. The spells may last longer than usual.   At home:  · Keep using the continuous positive airway pressure (CPAP) device when you sleep. Unless your health care provider tells you not to, use it when you sleep, day or night. CPAP is a common device  used to treat obstructive sleep apnea.  · Talk with your provider before taking any pain medicine, muscle relaxants, or sedatives. Your provider will tell you about the possible dangers of taking these medicines.  © 0882-1928 [employer]. [address] [place] [address], [place] [address]. All rights reserved. This information is not intended as a substitute for professional medical care. Always follow your healthcare professional's instructions.        Using Opioids for Pain Management     Your doctor has given instructions for you to take an opioid.  This is a drug for bad pain.  It helps control pain without causing bleeding and kidney problems.  Common opioid names are morphine, hydromorphone, oxycodone, and methadone. These drugs are called narcotics.    There are several safety concerns you need to know.     · It is against the law to give or sell this drug to another person.  You must keep this medicine safely locked.    · You may have side effects from taking this medication.  These include nausea, itching, sweating, sleepiness, a change in your ability to breathe, and depression.  · Do not take alcohol or sleeping pills opioids.    · Long-term opoid use may no longer giver you relief from pain.  It can cause you stomach pain, mental anxiety, and headaches.  Long-term opoid use can potentially lead to unlawful street drug abuse and reduce your ability to stay employed.    · Your body may become opioid tolerant if you need to take more to get relief.    · You must stop taking opioids if you begin having more pain as a result of the medicine.    · Opioid withdrawal occurs when you have to stop taking the drug.  It can cause you to have nausea, vomiting, diarrhea, stomach pain, anxiety, and dilated pupils in your eyes. This condition means you are opioid dependent.    · Addiction is a drug induced brain disease. It means there are changes in how your brain is working.  Children, teens, and young adults  under 25 years old are more likely to get addicted to opioids.      · Addiction can happen with repeated opioid use.  It does not happen with short-term use of two weeks or less.       For more information, please speak with your doctor or pharmacist.          Post op instructions for prevention of DVT  What is deep vein thrombosis?  Deep vein thrombosis (DVT) is the medical term for blood clots in the deep veins of the leg.  These blood clots can be dangerous.  A DVT can block a blood vessel and keep blood from getting where it needs to go.  Another problem is that the clot can travel to other parts of the body such as the lungs.  A clot that travels to the lungs is called a pulmonary embolus (PE) and can cause serious problems with breathing which can lead to death.  Am I at risk for DVT/PE?  If you are not very active, you are at risk of DVT.  Anyone confined to bed, sitting for long periods of time, recovering from surgery, etc. increases the risk of DVT.  Other risk factors are cancer diagnosis, certain medications, estrogen replacement in any form,older age, obesity, pregnancy, smoking, history of clotting disorders, and dehydration.  How will I know if I have a DVT?   Swelling in the lower leg   Pain   Warmth, redness, hardness or bulging of the vein  If you have any of these symptoms, call your doctors office right away.  Some people will not have any symptoms until the clot moves to the lungs.  What are the symptoms of a PE?   Panting, shortness of breath, or trouble breathing   Sharp, knife-like chest pain when you breathe   Coughing or coughing up blood   Rapid heartbeat  If you have any of these symptoms or get worse quickly, call 911 for emergency treatment.  How can I prevent a DVT?   Avoid long periods of inactivity and dont cross your legs--get up and walk around every hour or so.   Stay active--walking after surgery is highly encouraged.  This means you should get out of the house and  walk in the neighborhood.  Going up and down stairs will not impair healing (unless advised against such activity by your doctor).     Drink plenty of noncaffeinated, nonalcoholic fluids each day to prevent dehydration.   Wear special support stockings, if they have been advised by your doctor.   If you travel, stop at least once an hour and walk around.   Avoid smoking (assistance with stopping is available through your healthcare provider)  Always notify your doctor if you are not able to follow the post operative instructions that are given to you at the time of discharge.  It may be necessary to prescribe one of the medications available to prevent DVT.        We hope your stay was comfortable as you heal now, mend and rest.    For we have enjoyed taking care of you by giving your our best.    And as you get better, by regaining your health and strength;   We count it as a privilege to have served you and hope your time at [facility] was well spent.      Thank  You!!!

## 2018-03-12 NOTE — ANESTHESIA PREPROCEDURE EVALUATION
03/12/2018  Gera Doran is a 58 y.o., male.    Pre-op Assessment    I have reviewed the Patient Summary Reports.     I have reviewed the Nursing Notes.   I have reviewed the Medications.     Review of Systems  Anesthesia Hx:  No problems with previous Anesthesia    Social:  Non-Smoker    Hematology/Oncology:         -- Denies Leukopenia: -- Leukemia: Chronic Lymphocytic Leukemia (CLL)    Pulmonary:   Asthma asymptomatic Sleep Apnea    Hepatic/GI:   GERD, well controlled        Physical Exam  General:  Well nourished    Airway/Jaw/Neck:  Airway Findings: Mouth Opening: Normal Tongue: Normal  General Airway Assessment: Adult, Good  Mallampati: II  TM Distance: Normal, at least 6 cm       Chest/Lungs:  Chest/Lungs Findings: Clear to auscultation, Normal Respiratory Rate     Heart/Vascular:  Heart Findings: Rate: Normal  Rhythm: Regular Rhythm        Mental Status:  Mental Status Findings:  Alert and Oriented, Cooperative         Anesthesia Plan  Type of Anesthesia, risks & benefits discussed:  Anesthesia Type:  general  Patient's Preference:   Intra-op Monitoring Plan: standard ASA monitors  Intra-op Monitoring Plan Comments:   Post Op Pain Control Plan:   Post Op Pain Control Plan Comments:   Induction:   IV  Beta Blocker:  Patient is not currently on a Beta-Blocker (No further documentation required).       Informed Consent: Patient understands risks and agrees with Anesthesia plan.  Questions answered. Anesthesia consent signed with patient.  ASA Score: 3     Day of Surgery Review of History & Physical: I have interviewed and examined the patient. I have reviewed the patient's H&P dated:  There are no significant changes.          Ready For Surgery From Anesthesia Perspective.

## 2018-03-13 ENCOUNTER — TELEPHONE (OUTPATIENT)
Dept: UROLOGY | Facility: CLINIC | Age: 58
End: 2018-03-13

## 2018-03-13 VITALS
HEART RATE: 56 BPM | TEMPERATURE: 98 F | WEIGHT: 196 LBS | BODY MASS INDEX: 27.44 KG/M2 | HEIGHT: 71 IN | DIASTOLIC BLOOD PRESSURE: 93 MMHG | OXYGEN SATURATION: 100 % | RESPIRATION RATE: 14 BRPM | SYSTOLIC BLOOD PRESSURE: 152 MMHG

## 2018-03-13 NOTE — TELEPHONE ENCOUNTER
----- Message from Divina Shabazz sent at 3/13/2018  9:03 AM CDT -----  Contact: Chayito Doran (Spouse)  Chayito Doran (Spouse) calling to speak with the Nurse about the patient having complications yesterday after surgery. He ended up in the emergency room and was told to call Dr De La Cruz in the morning. Please advise. Call to pod. No answer.  Call back   Thanks!

## 2018-03-13 NOTE — TELEPHONE ENCOUNTER
Returned call and spoke to patient's wife, on Monday she is a nurse and will take the catheter out and if no urination, will bring patient to the clinic, she verbally understood.

## 2018-03-14 ENCOUNTER — TELEPHONE (OUTPATIENT)
Dept: UROLOGY | Facility: CLINIC | Age: 58
End: 2018-03-14

## 2018-03-14 NOTE — TELEPHONE ENCOUNTER
Called and spoke with patient, inquired how he was doing, patient states that on yesterday the catheter was bothering him and had his wife remove it. He says some blood clots came out the tip of the catheter and now he is able to urinate and its increasing as the time goes on. Informed to keep follow up appt, patient verbally understood.

## 2018-03-21 ENCOUNTER — OFFICE VISIT (OUTPATIENT)
Dept: PULMONOLOGY | Facility: CLINIC | Age: 58
End: 2018-03-21
Payer: MEDICARE

## 2018-03-21 VITALS
WEIGHT: 200.19 LBS | BODY MASS INDEX: 28.02 KG/M2 | HEIGHT: 71 IN | SYSTOLIC BLOOD PRESSURE: 137 MMHG | DIASTOLIC BLOOD PRESSURE: 77 MMHG | OXYGEN SATURATION: 99 % | HEART RATE: 58 BPM

## 2018-03-21 DIAGNOSIS — J68.3: ICD-10-CM

## 2018-03-21 DIAGNOSIS — D84.9 IMMUNE DEFICIENCY DISORDER: ICD-10-CM

## 2018-03-21 DIAGNOSIS — J82.83 EOSINOPHILIC ASTHMA: Primary | ICD-10-CM

## 2018-03-21 DIAGNOSIS — R32 URINARY INCONTINENCE, UNSPECIFIED TYPE: ICD-10-CM

## 2018-03-21 DIAGNOSIS — R09.89 CHRONIC SINUS COMPLAINTS: ICD-10-CM

## 2018-03-21 PROCEDURE — 99214 OFFICE O/P EST MOD 30 MIN: CPT | Mod: PBBFAC,PO | Performed by: INTERNAL MEDICINE

## 2018-03-21 PROCEDURE — 99999 PR PBB SHADOW E&M-EST. PATIENT-LVL IV: CPT | Mod: PBBFAC,,, | Performed by: INTERNAL MEDICINE

## 2018-03-21 PROCEDURE — 99214 OFFICE O/P EST MOD 30 MIN: CPT | Mod: S$PBB,,, | Performed by: INTERNAL MEDICINE

## 2018-03-21 RX ORDER — AZITHROMYCIN 500 MG/1
500 TABLET, FILM COATED ORAL DAILY
Qty: 3 TABLET | Refills: 5 | Status: SHIPPED | OUTPATIENT
Start: 2018-03-21 | End: 2019-04-03 | Stop reason: ALTCHOICE

## 2018-03-21 RX ORDER — FLUTICASONE FUROATE AND VILANTEROL 200; 25 UG/1; UG/1
1 POWDER RESPIRATORY (INHALATION) DAILY
Qty: 3 EACH | Refills: 3 | Status: SHIPPED | OUTPATIENT
Start: 2018-03-21 | End: 2020-02-11 | Stop reason: SDUPTHER

## 2018-03-21 RX ORDER — CIPROFLOXACIN 500 MG/1
500 TABLET ORAL EVERY 12 HOURS
Qty: 14 TABLET | Refills: 0 | Status: SHIPPED | OUTPATIENT
Start: 2018-03-21 | End: 2018-09-05 | Stop reason: ALTCHOICE

## 2018-03-21 RX ORDER — AZITHROMYCIN 500 MG/1
TABLET, FILM COATED ORAL
COMMUNITY
Start: 2018-02-17 | End: 2018-03-21 | Stop reason: SDUPTHER

## 2018-03-21 RX ORDER — ALBUTEROL SULFATE 90 UG/1
AEROSOL, METERED RESPIRATORY (INHALATION)
Qty: 3 INHALER | Refills: 3 | Status: SHIPPED | OUTPATIENT
Start: 2018-03-21 | End: 2019-12-30 | Stop reason: SDUPTHER

## 2018-03-21 RX ORDER — MONTELUKAST SODIUM 10 MG/1
10 TABLET ORAL NIGHTLY
Qty: 90 TABLET | Refills: 3 | Status: SHIPPED | OUTPATIENT
Start: 2018-03-21 | End: 2019-08-20 | Stop reason: SDUPTHER

## 2018-03-21 NOTE — PATIENT INSTRUCTIONS
External catheter may help?    Immune weakness - use azithromycin - if severe illness use cipro- get checked for bad infection.    If freq infections - may need igg replacement. Would recommend prevnar 13    Use breo and singulair routinely, use prednisone.
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
## 2018-03-27 ENCOUNTER — OFFICE VISIT (OUTPATIENT)
Dept: UROLOGY | Facility: CLINIC | Age: 58
End: 2018-03-27
Payer: MEDICARE

## 2018-03-27 VITALS
DIASTOLIC BLOOD PRESSURE: 86 MMHG | RESPIRATION RATE: 18 BRPM | SYSTOLIC BLOOD PRESSURE: 134 MMHG | TEMPERATURE: 99 F | HEART RATE: 59 BPM | HEIGHT: 71 IN | BODY MASS INDEX: 28.02 KG/M2 | WEIGHT: 200.19 LBS

## 2018-03-27 DIAGNOSIS — R35.0 URINARY FREQUENCY: ICD-10-CM

## 2018-03-27 DIAGNOSIS — N30.10 IC (INTERSTITIAL CYSTITIS): Primary | ICD-10-CM

## 2018-03-27 DIAGNOSIS — R33.9 INCOMPLETE EMPTYING OF BLADDER: ICD-10-CM

## 2018-03-27 DIAGNOSIS — R33.9 URINARY RETENTION: ICD-10-CM

## 2018-03-27 DIAGNOSIS — R35.1 NOCTURIA: ICD-10-CM

## 2018-03-27 PROCEDURE — 99999 PR PBB SHADOW E&M-EST. PATIENT-LVL III: CPT | Mod: PBBFAC,,, | Performed by: UROLOGY

## 2018-03-27 PROCEDURE — 51798 US URINE CAPACITY MEASURE: CPT | Mod: PBBFAC,PN | Performed by: UROLOGY

## 2018-03-27 PROCEDURE — 99213 OFFICE O/P EST LOW 20 MIN: CPT | Mod: 25,S$PBB,, | Performed by: UROLOGY

## 2018-03-27 PROCEDURE — 99213 OFFICE O/P EST LOW 20 MIN: CPT | Mod: PBBFAC,PN,25 | Performed by: UROLOGY

## 2018-03-27 PROCEDURE — 51702 INSERT TEMP BLADDER CATH: CPT | Mod: S$PBB,,, | Performed by: UROLOGY

## 2018-03-27 PROCEDURE — 51703 INSERT BLADDER CATH COMPLEX: CPT | Mod: PBBFAC,PN | Performed by: UROLOGY

## 2018-03-27 RX ORDER — TAMSULOSIN HYDROCHLORIDE 0.4 MG/1
0.4 CAPSULE ORAL DAILY
Qty: 30 CAPSULE | Refills: 11 | Status: SHIPPED | OUTPATIENT
Start: 2018-03-27 | End: 2019-04-22 | Stop reason: SDUPTHER

## 2018-03-27 NOTE — PROGRESS NOTES
OFFICE NOTE    CHIEF COMPLAINT:  Interstitial cystitis with lower urinary tract symptoms,   urinary retention, status post intravesical Botox injection.    HISTORY OF PRESENT ILLNESS:  This 58-year-old male returns for routine recheck.    He had undergone cystoscopy, bladder hydrodistention and intravesical Botox   injections on 03/12/2018 for treatment of his history of interstitial cystitis.    Later the same day following the procedure, the patient was having voiding   difficulty, presented to the Emergency Room and the Mauricio catheter was placed.    Subsequently, the catheter was removed the next day on 03/13/2018 by his wife   who is a nurse and returning now for routine recheck.  He has been able to void,   but he does continue to have significant lower tract symptoms with frequency,   nocturia, which occurs many times during the night.    PHYSICAL EXAMINATION:  ABDOMEN:  Soft.  Tenderness in the suprapubic area.  A bladder scan did reveal   over 200 mL of residual urine and it was thus decided that we will place a Mauricio   catheter, and the Mauricio catheter revealed only approximately 200 mL and it was   left indwelling since it was felt this would help alleviate his lower tract   symptoms.  It must also be noted that the patient has been experiencing some   aching and spasms in his right hamstring area and he states that whenever a   Mauricio catheter is placed that these symptoms resolve and he is yet to follow up   with his neurologist or primary care physician related to this.  I did mention   that I do not have a clear explanation for this as the volume was not excessive   and he stated he understood.    FINAL IMPRESSION:  Postop urinary retention, status post Botox injection and   interstitial cystitis and incomplete bladder emptying.    RECOMMENDATIONS:  Trial of Flomax 0.4 mg p.o. daily, Uribel q.i.d. p.r.n.    Continue with indwelling Maruicio catheter, and the wife who is a nurse will remove   the Mauricio  catheter later this weekend and observe his voiding status.    Otherwise, routine recheck in two weeks.      FARHANA  dd: 03/27/2018 12:54:04 (CDT)  td: 03/28/2018 00:52:39 (CDT)  Doc ID   #[id number]  Job ID #[id number]    CC:

## 2018-03-29 ENCOUNTER — OFFICE VISIT (OUTPATIENT)
Dept: HEMATOLOGY/ONCOLOGY | Facility: CLINIC | Age: 58
End: 2018-03-29
Payer: MEDICARE

## 2018-03-29 ENCOUNTER — LAB VISIT (OUTPATIENT)
Dept: LAB | Facility: HOSPITAL | Age: 58
End: 2018-03-29
Attending: INTERNAL MEDICINE
Payer: MEDICARE

## 2018-03-29 VITALS
HEIGHT: 71 IN | TEMPERATURE: 98 F | SYSTOLIC BLOOD PRESSURE: 132 MMHG | RESPIRATION RATE: 20 BRPM | BODY MASS INDEX: 28.39 KG/M2 | DIASTOLIC BLOOD PRESSURE: 70 MMHG | HEART RATE: 65 BPM | WEIGHT: 202.81 LBS

## 2018-03-29 DIAGNOSIS — N30.10 IC (INTERSTITIAL CYSTITIS): ICD-10-CM

## 2018-03-29 DIAGNOSIS — C91.10 CLL (CHRONIC LYMPHOCYTIC LEUKEMIA): ICD-10-CM

## 2018-03-29 DIAGNOSIS — F41.9 ANXIETY: ICD-10-CM

## 2018-03-29 DIAGNOSIS — C91.10 CLL (CHRONIC LYMPHOCYTIC LEUKEMIA): Primary | ICD-10-CM

## 2018-03-29 DIAGNOSIS — J82.83 EOSINOPHILIC ASTHMA: ICD-10-CM

## 2018-03-29 LAB
BASOPHILS NFR BLD: 1 %
DIFFERENTIAL METHOD: ABNORMAL
EOSINOPHIL NFR BLD: 0 %
ERYTHROCYTE [DISTWIDTH] IN BLOOD BY AUTOMATED COUNT: 13.5 %
HCT VFR BLD AUTO: 40.1 %
HGB BLD-MCNC: 13.1 G/DL
LYMPHOCYTES NFR BLD: 89 %
MCH RBC QN AUTO: 31.2 PG
MCHC RBC AUTO-ENTMCNC: 32.8 G/DL
MCV RBC AUTO: 95 FL
MONOCYTES NFR BLD: 3 %
NEUTROPHILS NFR BLD: 7 %
PLATELET # BLD AUTO: 207 K/UL
PLATELET BLD QL SMEAR: ABNORMAL
PMV BLD AUTO: 7.6 FL
RBC # BLD AUTO: 4.21 M/UL
SMUDGE CELLS BLD QL SMEAR: PRESENT
WBC # BLD AUTO: 25.8 K/UL

## 2018-03-29 PROCEDURE — 99213 OFFICE O/P EST LOW 20 MIN: CPT | Mod: PBBFAC,PO | Performed by: INTERNAL MEDICINE

## 2018-03-29 PROCEDURE — 85027 COMPLETE CBC AUTOMATED: CPT

## 2018-03-29 PROCEDURE — 99214 OFFICE O/P EST MOD 30 MIN: CPT | Mod: S$PBB,,, | Performed by: INTERNAL MEDICINE

## 2018-03-29 PROCEDURE — 99999 PR PBB SHADOW E&M-EST. PATIENT-LVL III: CPT | Mod: PBBFAC,,, | Performed by: INTERNAL MEDICINE

## 2018-03-29 PROCEDURE — 36415 COLL VENOUS BLD VENIPUNCTURE: CPT

## 2018-03-29 PROCEDURE — 85007 BL SMEAR W/DIFF WBC COUNT: CPT

## 2018-03-29 RX ORDER — LORAZEPAM 1 MG/1
1 TABLET ORAL EVERY 12 HOURS PRN
Qty: 90 TABLET | Refills: 0 | Status: SHIPPED | OUTPATIENT
Start: 2018-03-29 | End: 2018-05-30 | Stop reason: SDUPTHER
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
## 2018-03-29 NOTE — PROGRESS NOTES
Pt here for evaluation of leukocytosis and basophilia with smudge cells on smear    Gera Doran is a 58 y.o.    Visit 58-year-old gentleman was found to have smudge cells on a peripheral smear which was noticed upon ordering a routine follow-up CBC.  He was diagnosed with CLL and is on observation for such. He is free of night sweats, no weight loss and nno fever  Here for routine follow up of counts  He reports continued anxiety is much better since it has been  He is seeing urology regularly after requiring Mauricio placement for intervention of urinary retention  He remains with pain due to above    Past Medical History:   Diagnosis Date    Arthritis     Asthma     Chemical exposure    Chemical exposure     TO LUNG    GERD (gastroesophageal reflux disease)     IC (interstitial cystitis)     Leukemia     RLS (restless legs syndrome)     Sleep apnea     NO MACHINE         Current Outpatient Prescriptions:     albuterol 90 mcg/actuation inhaler, 2 puffs every 4 hours as needed for cough, wheeze, or shortness of breath, Disp: 3 Inhaler, Rfl: 3    albuterol-ipratropium 2.5mg-0.5mg/3mL (DUO-NEB) 0.5 mg-3 mg(2.5 mg base)/3 mL nebulizer solution, Take 3 mLs by nebulization every 6 (six) hours as needed. (Patient taking differently: Take 3 mLs by nebulization every 6 (six) hours as needed for Wheezing or Shortness of Breath. ), Disp: 120 vial, Rfl: 11    aspirin-salicylamide-caffeine (BC HEADACHE POWDER) 650-195-32 mg Pack, Take 1 packet by mouth once daily., Disp: , Rfl:     fluticasone (FLONASE) 50 mcg/actuation nasal spray, 2 sprays by Each Nare route as needed. (Patient taking differently: 2 sprays by Each Nare route as needed for Allergies. ), Disp: 1 Bottle, Rfl: 11    fluticasone-vilanterol (BREO ELLIPTA) 200-25 mcg/dose DsDv diskus inhaler, Inhale 1 puff into the lungs once daily. Controller, Disp: 3 each, Rfl: 3    LORazepam (ATIVAN) 1 MG tablet, Take 2 tablets (2 mg total) by mouth every 12  (twelve) hours as needed for Anxiety., Disp: 90 tablet, Rfl: 0    methen-m.blue-s.phos-phsal-hyo (URIBEL) 118-10-40.8-36 mg Cap, Take 1 capsule by mouth 4 (four) times daily as needed., Disp: 20 capsule, Rfl: 3    montelukast (SINGULAIR) 10 mg tablet, Take 1 tablet (10 mg total) by mouth every evening., Disp: 90 tablet, Rfl: 3    pantoprazole (PROTONIX) 40 MG tablet, Take 40 mg by mouth once daily. , Disp: , Rfl:     sertraline (ZOLOFT) 100 MG tablet, Take 50 mg by mouth every evening. 1/2 tablet daily, Disp: , Rfl:     tamsulosin (FLOMAX) 0.4 mg Cp24, Take 1 capsule (0.4 mg total) by mouth once daily., Disp: 30 capsule, Rfl: 11    azithromycin (ZITHROMAX) 500 MG tablet, Take 1 tablet (500 mg total) by mouth once daily., Disp: 3 tablet, Rfl: 5    catheter accessories, external Misc, 1 Device by Misc.(Non-Drug; Combo Route) route 2 (two) times daily as needed., Disp: 5 each, Rfl: 2    ciprofloxacin HCl (CIPRO) 500 MG tablet, Take 1 tablet (500 mg total) by mouth every 12 (twelve) hours., Disp: 14 tablet, Rfl: 0    predniSONE (DELTASONE) 20 MG tablet, 3 for 3 days then 2 for 3 days then one for 3 days and repeat for breathing problems, Disp: 36 tablet, Rfl: 0    CONSTITUTIONAL: No fevers, chills, night sweats, wt. loss  SKIN: no rashes or itching  ENT: No headaches, head trauma, vision changes, or eye pain  LYMPH NODES: palpable cervical nodes , left greater than right , one posterior neck   CV: No chest pain, palpitations.   RESP: No dyspnea on exertion, cough, wheezing, or hemoptysis  GI: No nausea, emesis, diarrhea, constipation, melena, hematochezia, intermittent pain.   : Positive complications being handled by urology  HEME: No easy bruising, bleeding problems  PSYCHIATRIC: No depression, ++anxiety,  nopsychosis, hallucinations.  NEURO: No seizures, memory loss, dizziness or difficulty sleeping  MSK: No arthralgias or joint swelling       /70   Pulse 65   Temp 97.7 °F (36.5 °C) (Oral)   Resp  "20   Ht 5' 11" (1.803 m)   Wt 92 kg (202 lb 13.2 oz)   BMI 28.29 kg/m²   Gen: NAD, A and O x3,   Psych: pleasant affect, normal thought process  Eyes:  EOM intact  Nose: Nares patent  Neck: suppple, no JVD, trachea midline,palpable lymph nodes and submental glands  Chest with no use of accessory muscles  Abd: soft, NTND, + BS, No HSM, no ascites  Extr: No CC RADHA, strength normal, good capillary refill  Neuro: steady gait, CNs grossly intact  Skin: intact, no lesions noted  Rheum: No joint swelling      Pathologist Review Peripheral Smear REVIEWED   Comments: Electronically reviewed and signed by:   Regi Yang M.D.   Signed on 01/23/17 at 13:41   PATHOLOGIST INTERPRETATION   WBC- Leukocytosis with an absolute lymphocytosis.  Lymphocytes are   mature and slightly atypical.  Smudge cells are increased.  The   morphology is suggestive of CLL.  Recommend flow cytometry of   peripheral blood ([id number])for confirmation.   RBC- Mild normocytic anemia without significant morphologic   abnormalities.   PLT- Normal in number and morphology.   Interpreted by Regi Yang M.D.      Resulting Agency [facility]       Lab Results   Component Value Date    WBC 25.80 (H) 03/29/2018    HGB 13.1 (L) 03/29/2018    HCT 40.1 03/29/2018    MCV 95 03/29/2018     03/29/2018         Lcs increased to 15,265    From 13,000    CMP  Sodium   Date Value Ref Range Status   03/09/2018 142 136 - 145 mmol/L Final     Potassium   Date Value Ref Range Status   03/09/2018 4.0 3.5 - 5.1 mmol/L Final     Chloride   Date Value Ref Range Status   03/09/2018 106 95 - 110 mmol/L Final     CO2   Date Value Ref Range Status   03/09/2018 30 (H) 23 - 29 mmol/L Final     Glucose   Date Value Ref Range Status   03/09/2018 76 70 - 110 mg/dL Final     BUN, Bld   Date Value Ref Range Status   03/09/2018 17 6 - 20 mg/dL Final     Creatinine   Date Value Ref Range Status   03/09/2018 1.1 0.5 - 1.4 mg/dL Final     Calcium   Date Value Ref Range Status "   03/09/2018 9.2 8.7 - 10.5 mg/dL Final     Total Protein   Date Value Ref Range Status   03/09/2018 6.1 6.0 - 8.4 g/dL Final     Albumin   Date Value Ref Range Status   03/09/2018 3.4 (L) 3.5 - 5.2 g/dL Final     Total Bilirubin   Date Value Ref Range Status   03/09/2018 0.5 0.1 - 1.0 mg/dL Final     Comment:     For infants and newborns, interpretation of results should be based  on gestational age, weight and in agreement with clinical  observations.  Premature Infant recommended reference ranges:  Up to 24 hours.............<8.0 mg/dL  Up to 48 hours............<12.0 mg/dL  3-5 days..................<15.0 mg/dL  6-29 days.................<15.0 mg/dL       Alkaline Phosphatase   Date Value Ref Range Status   03/09/2018 73 55 - 135 U/L Final     AST   Date Value Ref Range Status   03/09/2018 23 10 - 40 U/L Final     ALT   Date Value Ref Range Status   03/09/2018 41 10 - 44 U/L Final     Anion Gap   Date Value Ref Range Status   03/09/2018 6 (L) 8 - 16 mmol/L Final     eGFR if    Date Value Ref Range Status   03/09/2018 >60 >60 mL/min/1.73 m^2 Final     eGFR if non    Date Value Ref Range Status   03/09/2018 >60 >60 mL/min/1.73 m^2 Final     Comment:     Calculation used to obtain the estimated glomerular filtration  rate (eGFR) is the CKD-EPI equation.        CT Soft Tissue Neck With Contrast 02/09/2017 None Specified          RESULTS:  CT neck, chest, abdomen, and pelvis with contrast     No comparison     Technique: Axial images through the neck, chest, abdomen, and pelvis were acquired following intravenous administration of 100 mL Omnipaque 350. Multiplanar reformatted images were created.     FINDINGS:     CT NECK: There is mild, symmetric bilateral cervical lymphadenopathy, with a right level III lymph node measuring 11 mm short axis representing one of the larger nodes. The airway is midline and patent. The thyroid, submandibular, and parotid glands are unremarkable. Major  vascular structures of the neck are patent. Mild C5-6 degenerative disc changes noted. No suspicious osseous lesions.     CT CHEST: There is a mildly enlarged superior mediastinal lymph node measuring 11 mm located posterior to the trachea just below the thoracic inlet. Otherwise, there are no enlarged hilar, mediastinal, or axillary lymph nodes.     The heart size is normal without pericardial effusion. The thoracic aorta is normal caliber. The lungs are clear. There is no pleural effusion. There are no suspicious osseous lesions.     CT ABDOMEN and PELVIS: There is no venkata abdominal or pelvic lymphadenopathy. There is a prominent 8mm lymph node anterior to the aortic hiatus.     The liver and spleen are normal in size. The gallbladder, pancreas, and adrenal glands are unremarkable.     There are 3 lesions of the right kidney exhibiting density above that to allow for characterization of simple cysts. These measure 2.9, 2.7, and 0.9 cm, respectively. A small cyst of the left kidney is noted.     The small bowel is normal in caliber and appearance. There is moderate diverticulosis coli. Small hiatal hernia is present.     There are no suspicious osseous lesions.  IMPRESSION:      1. Mild cervical lymphadenopathy.  2. Mildly enlarged superior mediastinal lymph node with otherwise no lymphadenopathy within the chest, abdomen, or pelvis.  3. Indeterminant right renal lesions for which further evaluation with renal ultrasound is recommended.           Electronically signed by: Hayden Marshall MD     Pt had retroperitoneal ultrasound that showed renal mass vs complex cyst for which pt has follow up with Dr Lovell  Beta 2 normal  And LDH normal      There are no diagnoses linked to this encounter.  Pt may continue lorazepam for insomnia and anxiety  No need for treatment for his CLL at this time given the stability of his counts  Pt given the diagnosis and staging as a stage 3 CLL due to lymphocytosis and  anemia  Continue follow with Dr. 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 Please fell free to call me with any questions or concerns.    Leilani Rojas MD    This note was dictated with Dragon and briefly proofread. Please excuse any sentences that may be unclear or words misspelled

## 2018-04-12 ENCOUNTER — OFFICE VISIT (OUTPATIENT)
Dept: NEUROLOGY | Facility: CLINIC | Age: 58
End: 2018-04-12
Payer: MEDICARE

## 2018-04-12 VITALS
WEIGHT: 202.38 LBS | HEIGHT: 71 IN | SYSTOLIC BLOOD PRESSURE: 151 MMHG | BODY MASS INDEX: 28.33 KG/M2 | DIASTOLIC BLOOD PRESSURE: 92 MMHG | HEART RATE: 56 BPM

## 2018-04-12 DIAGNOSIS — M53.86 SCIATICA ASSOCIATED WITH DISORDER OF LUMBAR SPINE: Primary | ICD-10-CM

## 2018-04-12 PROCEDURE — 99999 PR PBB SHADOW E&M-EST. PATIENT-LVL III: CPT | Mod: PBBFAC,,, | Performed by: PSYCHIATRY & NEUROLOGY

## 2018-04-12 PROCEDURE — 99213 OFFICE O/P EST LOW 20 MIN: CPT | Mod: PBBFAC,PO | Performed by: PSYCHIATRY & NEUROLOGY

## 2018-04-12 PROCEDURE — 99204 OFFICE O/P NEW MOD 45 MIN: CPT | Mod: S$PBB,,, | Performed by: PSYCHIATRY & NEUROLOGY

## 2018-04-12 RX ORDER — GABAPENTIN 300 MG/1
300 CAPSULE ORAL EVERY 8 HOURS PRN
Qty: 90 CAPSULE | Refills: 11 | Status: SHIPPED | OUTPATIENT
Start: 2018-04-12 | End: 2021-06-23

## 2018-04-12 NOTE — PROGRESS NOTES
[facility] NEUROLOGY  [facility] [place]    Date: 4/12/18  Patient Name: Gera Doran   MRN: [id number]   PCP: Eusebio Jones  Referring Provider:     Assessment:   Gera Doran is a 58 y.o. male presenting for evaluation of chronic low back pain with known lumbar degenerative spinal patient also neurosurgery pain management.  Patient is to complete physical therapy.  Have provided referral for this today.  Additionally, have provided a trial of gabapentin 300 mg TID prn.    Plan:     Problem List Items Addressed This Visit     None      Visit Diagnoses     Sciatica associated with disorder of lumbar spine    -  Primary    Relevant Orders    Ambulatory consult to Physical Therapy        Sina Cary MD  [facility]   Department of Neurology    Patient note was created using Dragon Dictation.  Any errors in syntax or even information may not have been identified and edited on initial review prior to signing this note.  Subjective:        HPI:   Mr. Gera Doran is a 58 y.o. male who presents with a chief complaint of chronic low back pain with sciatica.  He presents today with his wife who contributes to the history.  The patient has known stenosis at L5/S1 (MRI personally reviewed).  He follows with pain management in neurosurgery.  He states that over 10 years ago, he noted pain in his right lower leg followed by lumbago.  Several years later and then later by clear sciatic pain.  He states that he struggles to drive long periods of time and notes that he frequently dries between [place] and [place] to visit a friend.  He states he has never completed physical therapy. Interestingly, the patient reports complete relief of pain when he has a urinary catheter placed.    PAST MEDICAL HISTORY:  Past Medical History:   Diagnosis Date    Arthritis     Asthma     Chemical exposure    Chemical exposure     TO LUNG    GERD (gastroesophageal reflux disease)      IC (interstitial cystitis)     Leukemia     RLS (restless legs syndrome)     Sleep apnea     NO MACHINE     PAST SURGICAL HISTORY:  Past Surgical History:   Procedure Laterality Date    APPENDECTOMY      CYSTOSCOPY      HERNIA REPAIR      SINUS SURGERY      stomach surgery for GERD      TONSILLECTOMY      tonsils       CURRENT MEDS:  Current Outpatient Prescriptions   Medication Sig Dispense Refill    albuterol 90 mcg/actuation inhaler 2 puffs every 4 hours as needed for cough, wheeze, or shortness of breath 3 Inhaler 3    albuterol-ipratropium 2.5mg-0.5mg/3mL (DUO-NEB) 0.5 mg-3 mg(2.5 mg base)/3 mL nebulizer solution Take 3 mLs by nebulization every 6 (six) hours as needed. (Patient taking differently: Take 3 mLs by nebulization every 6 (six) hours as needed for Wheezing or Shortness of Breath. ) 120 vial 11    aspirin-salicylamide-caffeine (BC HEADACHE POWDER) 650-195-32 mg Pack Take 1 packet by mouth once daily.      azithromycin (ZITHROMAX) 500 MG tablet Take 1 tablet (500 mg total) by mouth once daily. 3 tablet 5    fluticasone (FLONASE) 50 mcg/actuation nasal spray 2 sprays by Each Nare route as needed. (Patient taking differently: 2 sprays by Each Nare route as needed for Allergies. ) 1 Bottle 11    fluticasone-vilanterol (BREO ELLIPTA) 200-25 mcg/dose DsDv diskus inhaler Inhale 1 puff into the lungs once daily. Controller 3 each 3    LORazepam (ATIVAN) 1 MG tablet Take 1 tablet (1 mg total) by mouth every 12 (twelve) hours as needed for Anxiety. 90 tablet 0    montelukast (SINGULAIR) 10 mg tablet Take 1 tablet (10 mg total) by mouth every evening. 90 tablet 3    pantoprazole (PROTONIX) 40 MG tablet Take 40 mg by mouth once daily.       sertraline (ZOLOFT) 100 MG tablet Take 50 mg by mouth every evening. 1/2 tablet daily      tamsulosin (FLOMAX) 0.4 mg Cp24 Take 1 capsule (0.4 mg total) by mouth once daily. 30 capsule 11    catheter accessories, external Misc 1 Device by Misc.(Non-Drug;  "Combo Route) route 2 (two) times daily as needed. 5 each 2    ciprofloxacin HCl (CIPRO) 500 MG tablet Take 1 tablet (500 mg total) by mouth every 12 (twelve) hours. 14 tablet 0    gabapentin (NEURONTIN) 300 MG capsule Take 1 capsule (300 mg total) by mouth every 8 (eight) hours as needed. 90 capsule 11    methen-m.blue-s.phos-phsal-hyo (URIBEL) 118-10-40.8-36 mg Cap Take 1 capsule by mouth 4 (four) times daily as needed. 20 capsule 3    predniSONE (DELTASONE) 20 MG tablet 3 for 3 days then 2 for 3 days then one for 3 days and repeat for breathing problems 36 tablet 0     No current facility-administered medications for this visit.      ALLERGIES:  Review of patient's allergies indicates:   Allergen Reactions    Imitrex [sumatriptan succinate] Itching    Sulfa (sulfonamide antibiotics) Itching    Amoxicillin Itching     FAMILY HISTORY:  History reviewed. No pertinent family history.    SOCIAL HISTORY:  Social History   Substance Use Topics    Smoking status: Never Smoker    Smokeless tobacco: Current User     Types: Chew    Alcohol use Yes      Comment: OCC       Review of Systems:  12 review of systems is negative except for the symptoms mentioned in HPI.      Objective:     Vitals:    04/12/18 1314   BP: (!) 151/92   Pulse: (!) 56   Weight: 91.8 kg (202 lb 6.1 oz)   Height: 5' 11" (1.803 m)     General: NAD, well nourished   Eyes: no tearing, discharge, no erythema   ENT: moist mucous membranes of the oral cavity, nares patent    Neck: Supple, full range of motion  Cardiovascular: Warm and well perfused, pulses equal and symmetrical  Lungs: Normal work of breathing, normal chest wall excursions  Skin: No rash, lesions, or breakdown on exposed skin  Psychiatry: Mood and affect are appropriate   Abdomen: soft, non tender, non distended  Extremeties: No cyanosis, clubbing or edema.    Neurological   MENTAL STATUS: Alert and oriented to person, place, and time. Attention and concentration within normal " limits. Speech without dysarthria, able to name and repeat without difficulty. Recent and remote memory within normal limits   CRANIAL NERVES: Visual fields intact. PERRL. EOMI. Facial sensation intact. Face symmetrical. Hearing grossly intact. Full shoulder shrug bilaterally. Tongue protrudes midline   SENSORY: Sensation is intact to light touch throughout.  Positive straight leg raiseMOTOR: Normal bulk and tone. No pronator drift.  5/5 deltoid, biceps, triceps, interosseous, hand  bilaterally. 5/5 iliopsoas, knee extension/flexion, foot dorsi/plantarflexion bilaterally.    REFLEXES: Symmetric and 2+ throughout. Toes down going bilaterally.   CEREBELLAR/COORDINATION/GAIT: Gait steady with normal arm swing and stride length.  Finger to nose intact.

## 2018-04-12 NOTE — LETTER
April 12, 2018      No Recipients           [facility] Neurology  [address]  [place] [address]  Phone: [phone]  Fax: [phone]          Patient: Gera Doran   MR Number: [id number]   YOB: 1960   Date of Visit: 4/12/2018       Dear :    Thank you for referring Gera Doran to me for evaluation. Attached you will find relevant portions of my assessment and plan of care.    If you have questions, please do not hesitate to call me. I look forward to following Gera Doran along with you.    Sincerely,    Sina Cary MD    Enclosure  CC:  No Recipients    If you would like to receive this communication electronically, please contact [email or web address] or [phone] to request more information on [employer] access.    For providers and/or their staff who would like to refer a patient to [facility], please contact us through our one-stop-shop provider referral line, [facility] , at [phone].    If you feel you have received this communication in error or would no longer like to receive these types of communications, please e-mail [email or web address]

## 2018-04-12 NOTE — PATIENT INSTRUCTIONS
Understanding Lumbar Radiculopathy    Lumbar radiculopathy is irritation or inflammation of a nerve root in the low back. It causes symptoms that spread out from the back down one or both legs. To understand this condition, it helps to understand the parts of the spine:  · Vertebrae. These are bones that stack to form the spine. The lumbar spine contains the 5 bottom vertebrae.  · Disks. These are soft pads of tissue between the vertebrae. They act as shock absorbers for the spine.  · Spinal canal. This is a tunnel formed within the stacked vertebrae. In the lumbar spine, nerves run through this canal.  · Nerves. These branch off and leave the spinal canal, traveling out to parts of the body. As they leave the spinal canal, nerves pass through openings between the vertebrae. The nerve root is the part of the nerve that is closest to the spinal canal.  · Sciatic nerve. This is a large nerve formed from several nerve roots in the low back. This nerve extends down the back of the leg to the foot.  With lumbar radiculopathy, nerve roots in the low back become irritated. This leads to pain and symptoms. The sciatic nerve is commonly involved, so the condition is often called sciatica.  What causes lumbar radiculopathy?  Aging, injury, poor posture, extra body weight, and other issues can lead to problems in the low back. These problems may then irritate nerve roots. They include:  · Damage to a disk in the lumbar spine. The damaged disk may then press on nearby nerve roots.  · Degeneration from wear and tear, and aging. This can lead to narrowing (stenosis) of the openings between the vertebrae. The narrowed openings press on nerve roots as they leave the spinal canal.  · Unstable spine. This is when a vertebra slips forward. It can then press on a nerve root.  Other, less common things can put pressure on nerves in the low back. These include diabetes, infection, or a tumor.  Symptoms of lumbar radiculopathy  These  include:  · Pain in the low back  · Pain, numbness, tingling, or weakness that travels into the buttocks, hip, groin, or leg  · Muscle spasms  Treatment for lumbar radiculopathy  In most cases, your healthcare provider will first try treatments that help relieve symptoms. These may include:  · Prescription and over-the-counter pain medicines. These help relieve pain, swelling, and irritation.  · Limits on positions and activities that increase pain. But lying in bed or avoiding all movement is only recommended for a short period of time.  · Physical therapy, including exercises and stretches. This helps decrease pain and increase movement and function.  · Steroid shots into the lower back. This may help relieve symptoms for a time.  · Weight-loss program. If you are overweight, losing extra pounds may help relieve symptoms.  In some cases, you may need surgery to fix the underlying problem. This depends on the cause, the symptoms, and how long the pain has lasted.  Possible complications  Over time, an irritated and inflamed nerve may become damaged. This may lead to long-lasting (permanent) numbness or weakness in your legs and feet. If symptoms change suddenly or get worse, be sure to let your healthcare provider know.  When to call your healthcare provider  Call your healthcare provider right away if you have any of these:  · New pain or pain that gets worse  · New or increasing weakness, tingling, or numbness in your leg or foot  · Problems controlling your bladder or bowel   Date Last Reviewed: 3/10/2016  © 5796-1894 [employer]. [address] [place] [address], [place] [address]. All rights reserved. This information is not intended as a substitute for professional medical care. Always follow your healthcare professional's instructions.

## 2018-05-29 ENCOUNTER — TELEPHONE (OUTPATIENT)
Dept: HEMATOLOGY/ONCOLOGY | Facility: CLINIC | Age: 58
End: 2018-05-29

## 2018-05-29 NOTE — TELEPHONE ENCOUNTER
----- Message from Natalya Rae sent at 5/29/2018  2:01 PM CDT -----  states that he has been feeling fatigued for last 2 wks, needs bloodwork...[phone]

## 2018-05-30 ENCOUNTER — OFFICE VISIT (OUTPATIENT)
Dept: HEMATOLOGY/ONCOLOGY | Facility: CLINIC | Age: 58
End: 2018-05-30
Payer: MEDICARE

## 2018-05-30 ENCOUNTER — LAB VISIT (OUTPATIENT)
Dept: LAB | Facility: HOSPITAL | Age: 58
End: 2018-05-30
Attending: INTERNAL MEDICINE
Payer: MEDICARE

## 2018-05-30 VITALS
HEART RATE: 55 BPM | DIASTOLIC BLOOD PRESSURE: 87 MMHG | HEIGHT: 71 IN | RESPIRATION RATE: 20 BRPM | SYSTOLIC BLOOD PRESSURE: 147 MMHG | TEMPERATURE: 98 F | WEIGHT: 198.44 LBS | BODY MASS INDEX: 27.78 KG/M2

## 2018-05-30 DIAGNOSIS — D72.829 LEUKOCYTOSIS, UNSPECIFIED TYPE: Primary | ICD-10-CM

## 2018-05-30 DIAGNOSIS — D64.9 ANEMIA, UNSPECIFIED TYPE: ICD-10-CM

## 2018-05-30 DIAGNOSIS — R53.1 WEAKNESS: ICD-10-CM

## 2018-05-30 DIAGNOSIS — C91.10 CLL (CHRONIC LYMPHOCYTIC LEUKEMIA): ICD-10-CM

## 2018-05-30 DIAGNOSIS — D72.829 LEUKOCYTOSIS, UNSPECIFIED TYPE: ICD-10-CM

## 2018-05-30 DIAGNOSIS — R06.02 SOB (SHORTNESS OF BREATH): ICD-10-CM

## 2018-05-30 LAB
BASOPHILS # BLD AUTO: ABNORMAL K/UL
BASOPHILS NFR BLD: 0 %
DIFFERENTIAL METHOD: ABNORMAL
EOSINOPHIL # BLD AUTO: ABNORMAL K/UL
EOSINOPHIL NFR BLD: 2 %
ERYTHROCYTE [DISTWIDTH] IN BLOOD BY AUTOMATED COUNT: 13.3 %
HCT VFR BLD AUTO: 40.7 %
HGB BLD-MCNC: 13.6 G/DL
LYMPHOCYTES # BLD AUTO: ABNORMAL K/UL
LYMPHOCYTES NFR BLD: 88 %
MCH RBC QN AUTO: 31.2 PG
MCHC RBC AUTO-ENTMCNC: 33.5 G/DL
MCV RBC AUTO: 93 FL
MONOCYTES # BLD AUTO: ABNORMAL K/UL
MONOCYTES NFR BLD: 0 %
NEUTROPHILS NFR BLD: 10 %
PLATELET # BLD AUTO: 172 K/UL
PLATELET BLD QL SMEAR: ABNORMAL
PMV BLD AUTO: 8 FL
RBC # BLD AUTO: 4.36 M/UL
SMUDGE CELLS BLD QL SMEAR: PRESENT
WBC # BLD AUTO: 23.8 K/UL

## 2018-05-30 PROCEDURE — 99213 OFFICE O/P EST LOW 20 MIN: CPT | Mod: S$PBB,,, | Performed by: INTERNAL MEDICINE

## 2018-05-30 PROCEDURE — 85027 COMPLETE CBC AUTOMATED: CPT

## 2018-05-30 PROCEDURE — 36415 COLL VENOUS BLD VENIPUNCTURE: CPT

## 2018-05-30 PROCEDURE — 99213 OFFICE O/P EST LOW 20 MIN: CPT | Mod: PBBFAC,PO | Performed by: INTERNAL MEDICINE

## 2018-05-30 PROCEDURE — 85007 BL SMEAR W/DIFF WBC COUNT: CPT

## 2018-05-30 PROCEDURE — 99999 PR PBB SHADOW E&M-EST. PATIENT-LVL III: CPT | Mod: PBBFAC,,, | Performed by: INTERNAL MEDICINE

## 2018-05-30 PROCEDURE — 85060 BLOOD SMEAR INTERPRETATION: CPT | Mod: ,,, | Performed by: PATHOLOGY

## 2018-05-30 RX ORDER — LORAZEPAM 1 MG/1
1 TABLET ORAL EVERY 12 HOURS PRN
Qty: 90 TABLET | Refills: 0 | Status: SHIPPED | OUTPATIENT
Start: 2018-05-30 | End: 2021-02-12

## 2018-05-30 NOTE — PROGRESS NOTES
Pt is to be seen sooner because he states that he is having spells to where he feels as if he is going to pass out but he is not actually had a syncopal episode    Gera Doran is a 58 y.o.    Visit 58-year-old gentleman with chronic lymphocytic leukemia on observation alone    He states that intermittent times he feels is overwhelming sensation of exhaustion in weakness and feels as if he is going to pass out but he never loses consciousness he has had no falls.  He is concerned that his counts may be elevated in this could be causing his symptoms.  He reports continued anxiety is much better since he is taking the benzodiazepine  He is seeing urology regularly after requiring Mauricio placement for intervention of urinary retention      Past Medical History:   Diagnosis Date    Arthritis     Asthma     Chemical exposure    Chemical exposure     TO LUNG    GERD (gastroesophageal reflux disease)     IC (interstitial cystitis)     Leukemia     RLS (restless legs syndrome)     Sleep apnea     NO MACHINE         Current Outpatient Prescriptions:     albuterol 90 mcg/actuation inhaler, 2 puffs every 4 hours as needed for cough, wheeze, or shortness of breath, Disp: 3 Inhaler, Rfl: 3    albuterol-ipratropium 2.5mg-0.5mg/3mL (DUO-NEB) 0.5 mg-3 mg(2.5 mg base)/3 mL nebulizer solution, Take 3 mLs by nebulization every 6 (six) hours as needed. (Patient taking differently: Take 3 mLs by nebulization every 6 (six) hours as needed for Wheezing or Shortness of Breath. ), Disp: 120 vial, Rfl: 11    aspirin-salicylamide-caffeine (BC HEADACHE POWDER) 650-195-32 mg Pack, Take 1 packet by mouth once daily., Disp: , Rfl:     azithromycin (ZITHROMAX) 500 MG tablet, Take 1 tablet (500 mg total) by mouth once daily., Disp: 3 tablet, Rfl: 5    catheter accessories, external Misc, 1 Device by Misc.(Non-Drug; Combo Route) route 2 (two) times daily as needed., Disp: 5 each, Rfl: 2    ciprofloxacin HCl (CIPRO) 500 MG tablet,  Take 1 tablet (500 mg total) by mouth every 12 (twelve) hours., Disp: 14 tablet, Rfl: 0    fluticasone (FLONASE) 50 mcg/actuation nasal spray, 2 sprays by Each Nare route as needed. (Patient taking differently: 2 sprays by Each Nare route as needed for Allergies. ), Disp: 1 Bottle, Rfl: 11    fluticasone-vilanterol (BREO ELLIPTA) 200-25 mcg/dose DsDv diskus inhaler, Inhale 1 puff into the lungs once daily. Controller, Disp: 3 each, Rfl: 3    gabapentin (NEURONTIN) 300 MG capsule, Take 1 capsule (300 mg total) by mouth every 8 (eight) hours as needed., Disp: 90 capsule, Rfl: 11    methen-m.blue-s.phos-phsal-hyo (URIBEL) 118-10-40.8-36 mg Cap, Take 1 capsule by mouth 4 (four) times daily as needed., Disp: 20 capsule, Rfl: 3    montelukast (SINGULAIR) 10 mg tablet, Take 1 tablet (10 mg total) by mouth every evening., Disp: 90 tablet, Rfl: 3    pantoprazole (PROTONIX) 40 MG tablet, Take 40 mg by mouth once daily. , Disp: , Rfl:     predniSONE (DELTASONE) 20 MG tablet, 3 for 3 days then 2 for 3 days then one for 3 days and repeat for breathing problems, Disp: 36 tablet, Rfl: 0    sertraline (ZOLOFT) 100 MG tablet, Take 50 mg by mouth every evening. 1/2 tablet daily, Disp: , Rfl:     tamsulosin (FLOMAX) 0.4 mg Cp24, Take 1 capsule (0.4 mg total) by mouth once daily., Disp: 30 capsule, Rfl: 11    LORazepam (ATIVAN) 1 MG tablet, Take 1 tablet (1 mg total) by mouth every 12 (twelve) hours as needed for Anxiety., Disp: 90 tablet, Rfl: 0    CONSTITUTIONAL: No fevers, chills, night sweats, wt. loss  SKIN: no rashes or itching  ENT: No headaches, head trauma, vision changes, or eye pain  LYMPH NODES: palpable cervical nodes   CV: No chest pain, palpitations.   RESP: No dyspnea on exertion, cough, wheezing, or hemoptysis  GI: No nausea, emesis, diarrhea, constipation  : Positive complications being handled by urology  HEME: No easy bruising, bleeding problems  PSYCHIATRIC: No depression, ++anxiety,  nopsychosis,  "hallucinations.  NEURO: No seizures, memory loss + int weakness and dizziness  MSK: No arthralgias or joint swelling       BP (!) 147/87 (BP Location: Left arm, Patient Position: Sitting, BP Method: Medium (Automatic))   Pulse (!) 55   Temp 98.1 °F (36.7 °C) (Oral)   Resp 20   Ht 5' 11" (1.803 m)   Wt 90 kg (198 lb 6.6 oz)   BMI 27.67 kg/m²   Gen: NAD, A and O x3,   Psych: pleasant affect, normal thought process  Eyes:  EOM intact  Nose: Nares patent  Neck: suppple, no JVD  Chest with no use of accessory muscles  Abd: soft, NTND, + BS, No HSM, no ascites  Extr: No CC RADHA, strength normal, good capillary refill  Neuro: CNs grossly intact  Skin: intact, no lesions noted  Rheum: No joint swelling      Pathologist Review Peripheral Smear REVIEWED   Comments: Electronically reviewed and signed by:   Regi Yang M.D.   Signed on 01/23/17 at 13:41   PATHOLOGIST INTERPRETATION   WBC- Leukocytosis with an absolute lymphocytosis.  Lymphocytes are   mature and slightly atypical.  Smudge cells are increased.  The   morphology is suggestive of CLL.  Recommend flow cytometry of   peripheral blood (CKV7000)for confirmation.   RBC- Mild normocytic anemia without significant morphologic   abnormalities.   PLT- Normal in number and morphology.   Interpreted by Regi Yang M.D.      Resulting Agency [facility]       Lab Results   Component Value Date    WBC 25.80 (H) 03/29/2018    HGB 13.1 (L) 03/29/2018    HCT 40.1 03/29/2018    MCV 95 03/29/2018     03/29/2018         Lcs increased to 15,265    From 13,000    CMP  Sodium   Date Value Ref Range Status   03/09/2018 142 136 - 145 mmol/L Final     Potassium   Date Value Ref Range Status   03/09/2018 4.0 3.5 - 5.1 mmol/L Final     Chloride   Date Value Ref Range Status   03/09/2018 106 95 - 110 mmol/L Final     CO2   Date Value Ref Range Status   03/09/2018 30 (H) 23 - 29 mmol/L Final     Glucose   Date Value Ref Range Status   03/09/2018 76 70 - 110 mg/dL Final "     BUN, Bld   Date Value Ref Range Status   03/09/2018 17 6 - 20 mg/dL Final     Creatinine   Date Value Ref Range Status   03/09/2018 1.1 0.5 - 1.4 mg/dL Final     Calcium   Date Value Ref Range Status   03/09/2018 9.2 8.7 - 10.5 mg/dL Final     Total Protein   Date Value Ref Range Status   03/09/2018 6.1 6.0 - 8.4 g/dL Final     Albumin   Date Value Ref Range Status   03/09/2018 3.4 (L) 3.5 - 5.2 g/dL Final     Total Bilirubin   Date Value Ref Range Status   03/09/2018 0.5 0.1 - 1.0 mg/dL Final     Comment:     For infants and newborns, interpretation of results should be based  on gestational age, weight and in agreement with clinical  observations.  Premature Infant recommended reference ranges:  Up to 24 hours.............<8.0 mg/dL  Up to 48 hours............<12.0 mg/dL  3-5 days..................<15.0 mg/dL  6-29 days.................<15.0 mg/dL       Alkaline Phosphatase   Date Value Ref Range Status   03/09/2018 73 55 - 135 U/L Final     AST   Date Value Ref Range Status   03/09/2018 23 10 - 40 U/L Final     ALT   Date Value Ref Range Status   03/09/2018 41 10 - 44 U/L Final     Anion Gap   Date Value Ref Range Status   03/09/2018 6 (L) 8 - 16 mmol/L Final     eGFR if    Date Value Ref Range Status   03/09/2018 >60 >60 mL/min/1.73 m^2 Final     eGFR if non    Date Value Ref Range Status   03/09/2018 >60 >60 mL/min/1.73 m^2 Final     Comment:     Calculation used to obtain the estimated glomerular filtration  rate (eGFR) is the CKD-EPI equation.        CT Soft Tissue Neck With Contrast 02/09/2017 None Specified          RESULTS:  CT neck, chest, abdomen, and pelvis with contrast     No comparison     Technique: Axial images through the neck, chest, abdomen, and pelvis were acquired following intravenous administration of 100 mL Omnipaque 350. Multiplanar reformatted images were created.     FINDINGS:     CT NECK: There is mild, symmetric bilateral cervical lymphadenopathy,  with a right level III lymph node measuring 11 mm short axis representing one of the larger nodes. The airway is midline and patent. The thyroid, submandibular, and parotid glands are unremarkable. Major vascular structures of the neck are patent. Mild C5-6 degenerative disc changes noted. No suspicious osseous lesions.     CT CHEST: There is a mildly enlarged superior mediastinal lymph node measuring 11 mm located posterior to the trachea just below the thoracic inlet. Otherwise, there are no enlarged hilar, mediastinal, or axillary lymph nodes.     The heart size is normal without pericardial effusion. The thoracic aorta is normal caliber. The lungs are clear. There is no pleural effusion. There are no suspicious osseous lesions.     CT ABDOMEN and PELVIS: There is no venkata abdominal or pelvic lymphadenopathy. There is a prominent 8mm lymph node anterior to the aortic hiatus.     The liver and spleen are normal in size. The gallbladder, pancreas, and adrenal glands are unremarkable.     There are 3 lesions of the right kidney exhibiting density above that to allow for characterization of simple cysts. These measure 2.9, 2.7, and 0.9 cm, respectively. A small cyst of the left kidney is noted.     The small bowel is normal in caliber and appearance. There is moderate diverticulosis coli. Small hiatal hernia is present.     There are no suspicious osseous lesions.  IMPRESSION:      1. Mild cervical lymphadenopathy.  2. Mildly enlarged superior mediastinal lymph node with otherwise no lymphadenopathy within the chest, abdomen, or pelvis.  3. Indeterminant right renal lesions for which further evaluation with renal ultrasound is recommended.           Electronically signed by: Hayden Marshall MD     Pt had retroperitoneal ultrasound that showed renal mass vs complex cyst for which pt has follow up with Dr Lovell  Beta 2 normal  And LDH normal      Leukocytosis, unspecified type  -     CBC auto differential; Future;  Expected date: 05/30/2018    CLL (chronic lymphocytic leukemia)  -     LORazepam (ATIVAN) 1 MG tablet; Take 1 tablet (1 mg total) by mouth every 12 (twelve) hours as needed for Anxiety.  Dispense: 90 tablet; Refill: 0    Weakness    Anemia, unspecified type    SOB (shortness of breath)      Pt may continue lorazepam for insomnia and anxiety  Pt having spells of almost passing out affecting his quality of life : he describes this more as weakness  Recheck cbc and diff   He may need treatment for CLL yet he may need an MRA or neurology follow-up to help figure out the etiology of these weak spells if his counts are not elevated   Pt given the diagnosis and staging as a stage 3 CLL due to lymphocytosis and anemia  Continue follow with Dr. 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 Please fell free to call me with any questions or concerns.    GiuseppelyLeilani MD    This note was dictated with Dragon and briefly proofread. Please excuse any sentences that may be unclear or words misspelled

## 2018-05-31 LAB — PATH REV BLD -IMP: NORMAL

## 2018-06-01 ENCOUNTER — TELEPHONE (OUTPATIENT)
Dept: HEMATOLOGY/ONCOLOGY | Facility: CLINIC | Age: 58
End: 2018-06-01

## 2018-06-01 NOTE — TELEPHONE ENCOUNTER
----- Message from Luz Hernandez sent at 6/1/2018  2:34 PM CDT -----  Contact: patient   Patient calling to speak to the Nurse regarding results. Please advise.   Call back   Thanks!

## 2018-06-05 ENCOUNTER — TELEPHONE (OUTPATIENT)
Dept: HEMATOLOGY/ONCOLOGY | Facility: CLINIC | Age: 58
End: 2018-06-05

## 2018-07-09 ENCOUNTER — TELEPHONE (OUTPATIENT)
Dept: HEMATOLOGY/ONCOLOGY | Facility: CLINIC | Age: 58
End: 2018-07-09

## 2018-07-09 NOTE — TELEPHONE ENCOUNTER
----- Message from Eric Corbett sent at 7/9/2018 11:37 AM CDT -----  Contact: Ivory chavez/[facility]   Ivory is calling because pt came in with fatigue and he is trying to get some continued care since [place], pls call back and advise   Call Back#[phone]   Thanks

## 2018-07-27 DIAGNOSIS — R60.9 EDEMA, UNSPECIFIED TYPE: Primary | ICD-10-CM

## 2018-07-27 NOTE — TELEPHONE ENCOUNTER
----- Message from Jason Carnes sent at 7/27/2018 10:16 AM CDT -----  Contact: Patient  Type:  RX Refill Request    Who Called:  Giovanni  Refill or New Rx:  refill  RX Name and Strength:  Hydrochloride 25 mg  How is the patient currently taking it? (ex. 1XDay):  1 tablet once daily  Is this a 30 day or 90 day RX:  30  Preferred Pharmacy with phone number:  [facility] in [place], [place] phone [phone]  Local or Mail Order:  Local  Ordering Provider:  Dr. Alfred Fung Call Back Number:  [phone]  Additional Information:  n/a

## 2018-07-29 RX ORDER — HYDROCHLOROTHIAZIDE 25 MG/1
25 TABLET ORAL DAILY
Qty: 30 TABLET | Refills: 11 | Status: SHIPPED | OUTPATIENT
Start: 2018-07-29 | End: 2019-05-02

## 2018-09-05 ENCOUNTER — OFFICE VISIT (OUTPATIENT)
Dept: UROLOGY | Facility: CLINIC | Age: 58
End: 2018-09-05
Payer: MEDICARE

## 2018-09-05 VITALS
BODY MASS INDEX: 28.05 KG/M2 | HEART RATE: 56 BPM | TEMPERATURE: 98 F | HEIGHT: 71 IN | SYSTOLIC BLOOD PRESSURE: 127 MMHG | DIASTOLIC BLOOD PRESSURE: 80 MMHG | RESPIRATION RATE: 18 BRPM | WEIGHT: 200.38 LBS

## 2018-09-05 DIAGNOSIS — N13.8 ENLARGED PROSTATE WITH URINARY OBSTRUCTION: ICD-10-CM

## 2018-09-05 DIAGNOSIS — N40.1 ENLARGED PROSTATE WITH URINARY OBSTRUCTION: ICD-10-CM

## 2018-09-05 DIAGNOSIS — N30.10 INTERSTITIAL CYSTITIS: Primary | ICD-10-CM

## 2018-09-05 LAB
BILIRUB SERPL-MCNC: NORMAL MG/DL
BLOOD URINE, POC: NORMAL
COLOR, POC UA: YELLOW
GLUCOSE UR QL STRIP: NORMAL
KETONES UR QL STRIP: NORMAL
LEUKOCYTE ESTERASE URINE, POC: NORMAL
NITRITE, POC UA: NORMAL
PH, POC UA: 5
PROTEIN, POC: NORMAL
SPECIFIC GRAVITY, POC UA: 1.01
UROBILINOGEN, POC UA: NORMAL

## 2018-09-05 PROCEDURE — 99213 OFFICE O/P EST LOW 20 MIN: CPT | Mod: PBBFAC,PN,25 | Performed by: UROLOGY

## 2018-09-05 PROCEDURE — 81000 URINALYSIS NONAUTO W/SCOPE: CPT | Mod: PBBFAC,PN | Performed by: UROLOGY

## 2018-09-05 PROCEDURE — 99214 OFFICE O/P EST MOD 30 MIN: CPT | Mod: 25,S$PBB,, | Performed by: UROLOGY

## 2018-09-05 PROCEDURE — 99999 PR PBB SHADOW E&M-EST. PATIENT-LVL III: CPT | Mod: PBBFAC,,, | Performed by: UROLOGY

## 2018-09-05 PROCEDURE — 51798 US URINE CAPACITY MEASURE: CPT | Mod: PBBFAC,PN | Performed by: UROLOGY

## 2018-09-05 PROCEDURE — 81002 URINALYSIS NONAUTO W/O SCOPE: CPT | Mod: PBBFAC,PN | Performed by: UROLOGY

## 2018-09-05 NOTE — PROGRESS NOTES
OFFICE NOTE    CHIEF COMPLAINT:  BPH and interstitial cystitis with lower urinary tract   symptoms.    HISTORY OF PRESENT ILLNESS:  This 58-year-old male returns for routine recheck.    He has undergone cystoscopy, bladder hydrodistention, and intravesical Botox   injection on 03/12/2018 for management of his interstitial cystitis and   overactive bladder.  He also is on Flomax for management of BPH, and he did have   some problems with urinary retention following the above-mentioned procedure,   but he appears to be voiding with a good stream at this point, but his main   complaint also has episodes of increased nocturia, but he does also admit to at   times increased p.o. fluid intake in the evenings.    No other change in general health since his last visit on 03/27/2018.    PHYSICAL EXAMINATION:  ABDOMEN:  Soft, benign, and nontender.  No masses.  No hernias or organomegaly.  EXTERNAL GENITAL:  Normal phallus with adequate meatus.  Testes descended and   feel normal.  No scrotal masses.  RECTAL:  A 30 g smooth prostate.  No nodules.  Normal sphincter tone.    Bladder scan after he voided today revealed only 26 mL of postvoid residual,   which is significant improvement compared to his prior visit.    His last PSA was 3.9 on 01/30/2018.    UA negative with pH 5.0.    FINAL IMPRESSION:  BPH and interstitial cystitis with lower urinary tract   symptoms.    RECOMMENDATIONS:  Continue with Flomax 0.4 mg p.o. daily and the patient is   instructed to take it with his evening meal.  He was also instructed to decrease   his p.m. fluid intake and observe if he has a nocturia response.  It was also   recommended to obtain a 24-48 hour voiding diary and bring it to us at his next   office visit with us and we will plan to be in six weeks and observe his voiding   status.  Further treatment and evaluation other medication may need to be   considered at that time depending on his progress and this was discussed with   the  patient, he stated he understood and agreed.      MD/WILLIE  dd: 09/05/2018 11:34:54 (CDT)  td: 09/06/2018 06:48:45 (CDT)  Doc ID   #[id number]  Job ID #[id number]    CC:

## 2018-10-01 ENCOUNTER — LAB VISIT (OUTPATIENT)
Dept: LAB | Facility: HOSPITAL | Age: 58
End: 2018-10-01
Attending: INTERNAL MEDICINE
Payer: MEDICARE

## 2018-10-01 ENCOUNTER — OFFICE VISIT (OUTPATIENT)
Dept: HEMATOLOGY/ONCOLOGY | Facility: CLINIC | Age: 58
End: 2018-10-01
Payer: MEDICARE

## 2018-10-01 VITALS
BODY MASS INDEX: 28.45 KG/M2 | HEART RATE: 59 BPM | RESPIRATION RATE: 20 BRPM | DIASTOLIC BLOOD PRESSURE: 71 MMHG | WEIGHT: 203.25 LBS | TEMPERATURE: 98 F | HEIGHT: 71 IN | SYSTOLIC BLOOD PRESSURE: 126 MMHG

## 2018-10-01 DIAGNOSIS — D72.829 LEUKOCYTOSIS, UNSPECIFIED TYPE: ICD-10-CM

## 2018-10-01 DIAGNOSIS — F41.9 ANXIETY: ICD-10-CM

## 2018-10-01 DIAGNOSIS — C91.10 CLL (CHRONIC LYMPHOCYTIC LEUKEMIA): Primary | ICD-10-CM

## 2018-10-01 DIAGNOSIS — N30.10 INTERSTITIAL CYSTITIS: ICD-10-CM

## 2018-10-01 DIAGNOSIS — F32.A DEPRESSION, UNSPECIFIED DEPRESSION TYPE: ICD-10-CM

## 2018-10-01 DIAGNOSIS — N28.9 RENAL LESION: ICD-10-CM

## 2018-10-01 DIAGNOSIS — C91.10 CLL (CHRONIC LYMPHOCYTIC LEUKEMIA): ICD-10-CM

## 2018-10-01 DIAGNOSIS — R59.1 LYMPHADENOPATHY: ICD-10-CM

## 2018-10-01 LAB
BASOPHILS NFR BLD: 0 %
DIFFERENTIAL METHOD: ABNORMAL
EOSINOPHIL NFR BLD: 1 %
ERYTHROCYTE [DISTWIDTH] IN BLOOD BY AUTOMATED COUNT: 14 %
HCT VFR BLD AUTO: 40.7 %
HGB BLD-MCNC: 13.3 G/DL
HYPOCHROMIA BLD QL SMEAR: ABNORMAL
LYMPHOCYTES NFR BLD: 82 %
MCH RBC QN AUTO: 30.8 PG
MCHC RBC AUTO-ENTMCNC: 32.7 G/DL
MCV RBC AUTO: 94 FL
MONOCYTES NFR BLD: 3 %
NEUTROPHILS NFR BLD: 14 %
PLATELET # BLD AUTO: 160 K/UL
PLATELET BLD QL SMEAR: ABNORMAL
PMV BLD AUTO: 7.7 FL
RBC # BLD AUTO: 4.32 M/UL
SMUDGE CELLS BLD QL SMEAR: PRESENT
WBC # BLD AUTO: 24.8 K/UL

## 2018-10-01 PROCEDURE — 85027 COMPLETE CBC AUTOMATED: CPT

## 2018-10-01 PROCEDURE — 85060 BLOOD SMEAR INTERPRETATION: CPT | Mod: ,,, | Performed by: PATHOLOGY

## 2018-10-01 PROCEDURE — 99215 OFFICE O/P EST HI 40 MIN: CPT | Mod: S$PBB,,, | Performed by: INTERNAL MEDICINE

## 2018-10-01 PROCEDURE — 36415 COLL VENOUS BLD VENIPUNCTURE: CPT

## 2018-10-01 PROCEDURE — 99999 PR PBB SHADOW E&M-EST. PATIENT-LVL IV: CPT | Mod: PBBFAC,,, | Performed by: INTERNAL MEDICINE

## 2018-10-01 PROCEDURE — G0444 DEPRESSION SCREEN ANNUAL: HCPCS | Mod: PBBFAC,PO | Performed by: INTERNAL MEDICINE

## 2018-10-01 PROCEDURE — 99214 OFFICE O/P EST MOD 30 MIN: CPT | Mod: PBBFAC,PO | Performed by: INTERNAL MEDICINE

## 2018-10-01 PROCEDURE — 85007 BL SMEAR W/DIFF WBC COUNT: CPT

## 2018-10-01 NOTE — PROGRESS NOTES
CC How are my counts:  I think any treatment for leukemia    Gera Doran is a 58 y.o.    Visit 58-year-old gentleman with chronic lymphocytic leukemia on observation alone    Since last visit he has seen his primary care physician is had a pigmented skin lesion removed any is also seen his urologist.  He is not having any drenching night sweats no fever and no weight loss.  He is concerned about his underlying diagnosis of leukemia in his care that he needs treatment for such he continues with depression and anxiety.  He reports continued anxiety is much better since he is taking the benzodiazepine  He is tolerating Flomax for BPH in his symptoms are improving as well.    Reiterated findings last scans including bilateral cervical adenopathy with superior mediastinal lymphadenopathy with maximum dimension of the abnormal lymph node at 11 mm as well as renal cysts.      Past Medical History:   Diagnosis Date    Arthritis     Asthma     Chemical exposure    Chemical exposure     TO LUNG    GERD (gastroesophageal reflux disease)     IC (interstitial cystitis)     Leukemia     RLS (restless legs syndrome)     Sleep apnea     NO MACHINE         Current Outpatient Medications:     albuterol 90 mcg/actuation inhaler, 2 puffs every 4 hours as needed for cough, wheeze, or shortness of breath, Disp: 3 Inhaler, Rfl: 3    albuterol-ipratropium 2.5mg-0.5mg/3mL (DUO-NEB) 0.5 mg-3 mg(2.5 mg base)/3 mL nebulizer solution, Take 3 mLs by nebulization every 6 (six) hours as needed. (Patient taking differently: Take 3 mLs by nebulization every 6 (six) hours as needed for Wheezing or Shortness of Breath. ), Disp: 120 vial, Rfl: 11    aspirin-salicylamide-caffeine (BC HEADACHE POWDER) 650-195-32 mg Pack, Take 1 packet by mouth once daily., Disp: , Rfl:     azithromycin (ZITHROMAX) 500 MG tablet, Take 1 tablet (500 mg total) by mouth once daily., Disp: 3 tablet, Rfl: 5    catheter accessories, external Misc, 1 Device  by Misc.(Non-Drug; Combo Route) route 2 (two) times daily as needed., Disp: 5 each, Rfl: 2    fluticasone (FLONASE) 50 mcg/actuation nasal spray, 2 sprays by Each Nare route as needed. (Patient taking differently: 2 sprays by Each Nare route as needed for Allergies. ), Disp: 1 Bottle, Rfl: 11    fluticasone-vilanterol (BREO ELLIPTA) 200-25 mcg/dose DsDv diskus inhaler, Inhale 1 puff into the lungs once daily. Controller, Disp: 3 each, Rfl: 3    gabapentin (NEURONTIN) 300 MG capsule, Take 1 capsule (300 mg total) by mouth every 8 (eight) hours as needed., Disp: 90 capsule, Rfl: 11    hydroCHLOROthiazide (HYDRODIURIL) 25 MG tablet, Take 1 tablet (25 mg total) by mouth once daily., Disp: 30 tablet, Rfl: 11    LORazepam (ATIVAN) 1 MG tablet, Take 1 tablet (1 mg total) by mouth every 12 (twelve) hours as needed for Anxiety., Disp: 90 tablet, Rfl: 0    montelukast (SINGULAIR) 10 mg tablet, Take 1 tablet (10 mg total) by mouth every evening., Disp: 90 tablet, Rfl: 3    pantoprazole (PROTONIX) 40 MG tablet, Take 40 mg by mouth once daily. , Disp: , Rfl:     predniSONE (DELTASONE) 20 MG tablet, 3 for 3 days then 2 for 3 days then one for 3 days and repeat for breathing problems, Disp: 36 tablet, Rfl: 0    sertraline (ZOLOFT) 100 MG tablet, Take 50 mg by mouth every evening. 1/2 tablet daily, Disp: , Rfl:     tamsulosin (FLOMAX) 0.4 mg Cp24, Take 1 capsule (0.4 mg total) by mouth once daily., Disp: 30 capsule, Rfl: 11    CONSTITUTIONAL: No fevers, chills, night sweats, wt. loss  SKIN: no rashes or itching  ENT: No headaches, head trauma, vision changes, or eye pain  LYMPH NODES: palpable cervical nodes   CV: No chest pain, palpitations.   RESP: No dyspnea on exertion, cough, wheezing, or hemoptysis  GI: No nausea, emesis, diarrhea, constipation  : Positive complications being handled by urology  HEME: No easy bruising, bleeding problems  PSYCHIATRIC: No depression, ++anxiety,  nopsychosis, hallucinations.  NEURO:  "No seizures, memory loss no further  weakness and dizziness  MSK: No arthralgias or joint swelling       /71   Pulse (!) 59   Temp 98.1 °F (36.7 °C)   Resp 20   Ht 5' 11" (1.803 m)   Wt 92.2 kg (203 lb 4.2 oz)   BMI 28.35 kg/m²     Constitutional: oriented to person, place, and time.  well-developed and well-nourished.   HENT:   Head: Normocephalic and atraumatic.   Right Ear: External ear normal.   Left Ear: External ear normal.   Nose: Nose normal.   Mouth/Throat: Oropharynx is clear and moist.   Eyes: Conjunctivae and EOM are normal. Pupils are equal, round, and reactive to light.   Neck: Normal range of motion. Neck supple. No thyromegaly present.   Cardiovascular: Normal rate, regular rhythm, normal heart sounds and intact distal pulses.    No murmur heard.  Pulmonary/Chest: Effort normal and breath sounds normal.  no wheezes.  no rales.   Abdominal: Soft. Bowel sounds are normal.  no mass. There is no tenderness. There is no rebound.   Musculoskeletal: Normal range of motion.  no edema or tenderness.   Lymphadenopathy:    no cervical adenopathy.   Neurological: alert and oriented to person, place, and time. normal reflexes. No cranial nerve deficit.   Skin: Skin is warm and dry. No rash noted.   Psychiatric: flat mood and affect.   behavior is normal.   Vitals reviewed.    Lab Results   Component Value Date    WBC 24.80 (H) 10/01/2018    RBC 4.32 (L) 10/01/2018    HGB 13.3 (L) 10/01/2018    HCT 40.7 10/01/2018    MCV 94 10/01/2018    MCH 30.8 10/01/2018    MCHC 32.7 10/01/2018    RDW 14.0 10/01/2018     10/01/2018    MPV 7.7 (L) 10/01/2018    GRAN 14.0 (L) 10/01/2018    LYMPH 82.0 (H) 10/01/2018    MONO 3.0 (L) 10/01/2018    EOS CANCELED 05/30/2018    BASO CANCELED 05/30/2018    EOSINOPHIL 1.0 10/01/2018    BASOPHIL 0.0 10/01/2018       Ref Range & Units11:25  WBC3.90 - 12.70 K/uL24.80 Abnormally high  RBC4.60 - 6.20 M/uL4.32 Abnormally low  Yhykgvtzdx55.0 - 18.0 g/dL13.3 Abnormally low  " Xureewrxbi04.0 - 54.0 %40.7 MCV82 - 98 fL94 MCH27.0 - 31.0 pg30.8 MCHC32.0 - 36.0 g/dL32.7 RDW11.5 - 14.5 %14.0 Nojjfxxdw485 - 350 K/uL160 MPV9.2 - 12.9 fL7.7 Abnormally low  Gran%38.0 - 73.0 %14.0 Abnormally low  Lymph%18.0 - 48.0 %82.0 Abnormally high  Mono%4.0 - 15.0 %3.0 Abnormally low  Eosinophil%0.0 - 8.0 %1.0 Basophil%0.0 - 1.9 %0.0 Platelet EstimateAppears normal HypoOccasional       Pathologist Review Peripheral Smear REVIEWED   Comments: Electronically reviewed and signed by:   Regi Yang M.D.   Signed on 01/23/17 at 13:41   PATHOLOGIST INTERPRETATION   WBC- Leukocytosis with an absolute lymphocytosis.  Lymphocytes are   mature and slightly atypical.  Smudge cells are increased.  The   morphology is suggestive of CLL.  Recommend flow cytometry of   peripheral blood ([id number])for confirmation.   RBC- Mild normocytic anemia without significant morphologic   abnormalities.   PLT- Normal in number and morphology.   Interpreted by Regi Yang M.D.      Resulting Agency [facility]       Lymphocytes decreased from 25473 to 60450    CMP    CT Soft Tissue Neck With Contrast 02/09/2017 None Specified          RESULTS:  CT neck, chest, abdomen, and pelvis with contrast     No comparison     Technique: Axial images through the neck, chest, abdomen, and pelvis were acquired following intravenous administration of 100 mL Omnipaque 350. Multiplanar reformatted images were created.     FINDINGS:     CT NECK: There is mild, symmetric bilateral cervical lymphadenopathy, with a right level III lymph node measuring 11 mm short axis representing one of the larger nodes. The airway is midline and patent. The thyroid, submandibular, and parotid glands are unremarkable. Major vascular structures of the neck are patent. Mild C5-6 degenerative disc changes noted. No suspicious osseous lesions.     CT CHEST: There is a mildly enlarged superior mediastinal lymph node measuring 11 mm located posterior to the trachea just  below the thoracic inlet. Otherwise, there are no enlarged hilar, mediastinal, or axillary lymph nodes.     The heart size is normal without pericardial effusion. The thoracic aorta is normal caliber. The lungs are clear. There is no pleural effusion. There are no suspicious osseous lesions.     CT ABDOMEN and PELVIS: There is no venkata abdominal or pelvic lymphadenopathy. There is a prominent 8mm lymph node anterior to the aortic hiatus.     The liver and spleen are normal in size. The gallbladder, pancreas, and adrenal glands are unremarkable.     There are 3 lesions of the right kidney exhibiting density above that to allow for characterization of simple cysts. These measure 2.9, 2.7, and 0.9 cm, respectively. A small cyst of the left kidney is noted.     The small bowel is normal in caliber and appearance. There is moderate diverticulosis coli. Small hiatal hernia is present.     There are no suspicious osseous lesions.  IMPRESSION:      1. Mild cervical lymphadenopathy.  2. Mildly enlarged superior mediastinal lymph node with otherwise no lymphadenopathy within the chest, abdomen, or pelvis.  3. Indeterminant right renal lesions for which further evaluation with renal ultrasound is recommended.           Electronically signed by: Hayden Marshall MD     Pt had retroperitoneal ultrasound that showed renal mass vs complex cyst for which pt has follow up with Dr Lovell  Beta 2 normal  And LDH normal      CLL (chronic lymphocytic leukemia)  -     CBC auto differential; Standing    Renal lesion    Depression, unspecified depression type    Anxiety    Lymphadenopathy    Leukocytosis, unspecified type    Interstitial cystitis     I reiterated how this is a chronic disease in most patients do not need treatment for such.  I understand it has difficulty coping when you have been given the diagnosis of leukemia.  Explained the differences between acute and chronic and the differences in progression.  He will need  lifelong monitoring for such.  He has less than a 10% chance of transitioning into an aggressive leukemia.  Every now and then he will need undergo CT scans to check for bulky disease in organ compromise.  For now is to continue observation alone  Pt may continue lorazepam for insomnia and anxiety  His counts are relatively stable not requiring intervention for CLL at this point in time.  I reviewed the data per up-to-date when chronic lymphocytic lymph to patient should undergo therapy he is not having any symptoms of hyper is a viscosity syndrome any is free of constitutional symptoms.  Pt given the diagnosis and staging as a stage 3 CLL due to lymphocytosis and anemia  Continue follow with Dr. 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  Time radiology felt this could possibly be related to his multiple renal cysts.  Recheck in a couple of months.  Patient remains asymptomatic at this time        Thank you for allowing me to evaluate and participate in the care of this pleasant patient. Please fell free to call me with any questions or concerns.    [place],  Leilani Simon MD    This note was dictated with Dragon and briefly proofread. Please excuse any sentences that may be unclear or words misspelled

## 2018-10-02 LAB — PATH REV BLD -IMP: NORMAL

## 2018-10-19 ENCOUNTER — APPOINTMENT (OUTPATIENT)
Dept: LAB | Facility: HOSPITAL | Age: 58
End: 2018-10-19
Attending: UROLOGY
Payer: MEDICARE

## 2018-10-19 ENCOUNTER — OFFICE VISIT (OUTPATIENT)
Dept: UROLOGY | Facility: CLINIC | Age: 58
End: 2018-10-19
Payer: MEDICARE

## 2018-10-19 VITALS
RESPIRATION RATE: 18 BRPM | WEIGHT: 196.19 LBS | DIASTOLIC BLOOD PRESSURE: 81 MMHG | HEIGHT: 71 IN | BODY MASS INDEX: 27.47 KG/M2 | SYSTOLIC BLOOD PRESSURE: 137 MMHG | HEART RATE: 59 BPM | TEMPERATURE: 98 F

## 2018-10-19 DIAGNOSIS — R35.1 NOCTURIA: ICD-10-CM

## 2018-10-19 DIAGNOSIS — R31.29 MICROSCOPIC HEMATURIA: ICD-10-CM

## 2018-10-19 DIAGNOSIS — N30.10 INTERSTITIAL CYSTITIS: Primary | ICD-10-CM

## 2018-10-19 DIAGNOSIS — N40.0 BENIGN PROSTATIC HYPERPLASIA WITHOUT LOWER URINARY TRACT SYMPTOMS: ICD-10-CM

## 2018-10-19 PROCEDURE — 87086 URINE CULTURE/COLONY COUNT: CPT

## 2018-10-19 PROCEDURE — 88112 CYTOPATH CELL ENHANCE TECH: CPT | Performed by: PATHOLOGY

## 2018-10-19 PROCEDURE — 99213 OFFICE O/P EST LOW 20 MIN: CPT | Mod: S$PBB,,, | Performed by: UROLOGY

## 2018-10-19 PROCEDURE — 81002 URINALYSIS NONAUTO W/O SCOPE: CPT | Mod: PBBFAC,PN | Performed by: UROLOGY

## 2018-10-19 PROCEDURE — 51798 US URINE CAPACITY MEASURE: CPT | Mod: PBBFAC,PN | Performed by: UROLOGY

## 2018-10-19 PROCEDURE — 81000 URINALYSIS NONAUTO W/SCOPE: CPT | Mod: PBBFAC,PN | Performed by: UROLOGY

## 2018-10-19 PROCEDURE — 99214 OFFICE O/P EST MOD 30 MIN: CPT | Mod: PBBFAC,PN | Performed by: UROLOGY

## 2018-10-19 PROCEDURE — 99999 PR PBB SHADOW E&M-EST. PATIENT-LVL IV: CPT | Mod: PBBFAC,,, | Performed by: UROLOGY

## 2018-10-19 NOTE — PROGRESS NOTES
OFFICE NOTE    CHIEF COMPLAINT:  BPH, interstitial cystitis.    HISTORY OF PRESENT ILLNESS:  This 58-year-old male returns for routine recheck.    He has a history of BPH for which takes Flomax 0.4 mg p.o. daily with which   overall he has a satisfactory stream, but his symptoms are mostly related to his   history of interstitial cystitis.  He did undergo cystoscopy, bladder   hydrodistention and intravesical Botox injection on 03/12/2018, and since his   last visit he has brought in today a voiding diary.  Says he does have   significant problems with nocturia, which is his main complaint, and the voiding   diary reveals frequent episodes of voiding at night with a maximum volume being   100 mL and most of the volumes were in the 50 mL range.    His last PSA was 3.9 on 01/30/2018.    Bladder scan today revealed only 17 mL of residual urine.    UA did reveal 1+ blood, moderate number of rbc's, and pH 5.0.    FINAL IMPRESSION:  Interstitial cystitis, overactive bladder, nocturia, BPH,   microscopic hematuria.    RECOMMENDATIONS:  Urine for C and S and cytology.  Continue on Flomax 0.4 mg   p.o. daily and we will add Elmiron 100 mg p.o. t.i.d., and he also was placed on   a trial of Myrbetriq 50 mg p.o. at bedtime to observe the response of the   nocturia.  Otherwise, routine recheck in one month.      FARHANA  dd: 10/19/2018 16:24:09 (CDT)  td: 10/20/2018 04:17:17 (CDT)  Doc ID   #[id number]  Job ID #[id number]    CC:

## 2018-10-20 LAB — BACTERIA UR CULT: NO GROWTH

## 2018-10-22 ENCOUNTER — TELEPHONE (OUTPATIENT)
Dept: PULMONOLOGY | Facility: CLINIC | Age: 58
End: 2018-10-22

## 2018-10-22 DIAGNOSIS — J82.83 EOSINOPHILIC ASTHMA: ICD-10-CM

## 2018-10-22 DIAGNOSIS — J68.3: ICD-10-CM

## 2018-10-22 NOTE — TELEPHONE ENCOUNTER
Advised pt that he still had refills on file with the pharmacy. Pt verbalized understanding.     ----- Message from Marce Ritter sent at 10/22/2018  8:43 AM CDT -----  Contact: patient  Type:  RX Refill Request    Who Called:  Patient  Refill or New Rx:  refill  RX Name and Strength:  fluticasone-vilanterol (BREO ELLIPTA) 200-25 mcg/dose DsDv diskus inhaler, and predniSONE (DELTASONE) 20 MG tablet  How is the patient currently taking it? (ex. 1XDay):    Is this a 30 day or 90 day RX:    Preferred Pharmacy with phone number:  [facility]  Local or Mail Order:  local  Ordering Provider:  Dr.Dale Fung Call Back Number:  [phone]  Additional Information:  Requesting a call back when script has been sent

## 2019-03-27 ENCOUNTER — TELEPHONE (OUTPATIENT)
Dept: HEMATOLOGY/ONCOLOGY | Facility: CLINIC | Age: 59
End: 2019-03-27

## 2019-03-27 NOTE — TELEPHONE ENCOUNTER
Called and spoke to pt to reschedule missed apt with Dr. Simon on 3/1. Pt confirmed apt date and time rescheduled and verbalized understanding.

## 2019-04-03 ENCOUNTER — LAB VISIT (OUTPATIENT)
Dept: LAB | Facility: HOSPITAL | Age: 59
End: 2019-04-03
Attending: INTERNAL MEDICINE
Payer: MEDICARE

## 2019-04-03 ENCOUNTER — OFFICE VISIT (OUTPATIENT)
Dept: HEMATOLOGY/ONCOLOGY | Facility: CLINIC | Age: 59
End: 2019-04-03
Payer: MEDICARE

## 2019-04-03 VITALS
WEIGHT: 197 LBS | SYSTOLIC BLOOD PRESSURE: 132 MMHG | TEMPERATURE: 98 F | BODY MASS INDEX: 27.58 KG/M2 | RESPIRATION RATE: 16 BRPM | HEART RATE: 53 BPM | HEIGHT: 71 IN | DIASTOLIC BLOOD PRESSURE: 76 MMHG

## 2019-04-03 DIAGNOSIS — C91.10 CLL (CHRONIC LYMPHOCYTIC LEUKEMIA): ICD-10-CM

## 2019-04-03 DIAGNOSIS — N28.1 RENAL CYST: Primary | ICD-10-CM

## 2019-04-03 DIAGNOSIS — D69.6 THROMBOCYTOPENIA: ICD-10-CM

## 2019-04-03 DIAGNOSIS — D64.9 ANEMIA, UNSPECIFIED TYPE: ICD-10-CM

## 2019-04-03 DIAGNOSIS — G89.4 CHRONIC PAIN SYNDROME: ICD-10-CM

## 2019-04-03 LAB
BASOPHILS NFR BLD: 1 % (ref 0–1.9)
DIFFERENTIAL METHOD: ABNORMAL
EOSINOPHIL NFR BLD: 1 % (ref 0–8)
ERYTHROCYTE [DISTWIDTH] IN BLOOD BY AUTOMATED COUNT: 13.6 % (ref 11.5–14.5)
HCT VFR BLD AUTO: 38.1 % (ref 40–54)
HGB BLD-MCNC: 12.3 G/DL (ref 14–18)
IMM GRANULOCYTES # BLD AUTO: ABNORMAL K/UL (ref 0–0.04)
IMM GRANULOCYTES NFR BLD AUTO: ABNORMAL % (ref 0–0.5)
LYMPHOCYTES NFR BLD: 85 % (ref 18–48)
MCH RBC QN AUTO: 31.3 PG (ref 27–31)
MCHC RBC AUTO-ENTMCNC: 32.3 G/DL (ref 32–36)
MCV RBC AUTO: 97 FL (ref 82–98)
MONOCYTES NFR BLD: 3 % (ref 4–15)
NEUTROPHILS NFR BLD: 10 % (ref 38–73)
NRBC BLD-RTO: 0 /100 WBC
PLATELET # BLD AUTO: 138 K/UL (ref 150–350)
PMV BLD AUTO: 9.8 FL (ref 9.2–12.9)
RBC # BLD AUTO: 3.93 M/UL (ref 4.6–6.2)
SMUDGE CELLS BLD QL SMEAR: PRESENT
WBC # BLD AUTO: 37.77 K/UL (ref 3.9–12.7)

## 2019-04-03 PROCEDURE — 99215 PR OFFICE/OUTPT VISIT, EST, LEVL V, 40-54 MIN: ICD-10-PCS | Mod: S$PBB,,, | Performed by: INTERNAL MEDICINE

## 2019-04-03 PROCEDURE — 99999 PR PBB SHADOW E&M-EST. PATIENT-LVL IV: CPT | Mod: PBBFAC,,, | Performed by: INTERNAL MEDICINE

## 2019-04-03 PROCEDURE — 1125F AMNT PAIN NOTED PAIN PRSNT: CPT | Mod: ,,, | Performed by: INTERNAL MEDICINE

## 2019-04-03 PROCEDURE — 85007 BL SMEAR W/DIFF WBC COUNT: CPT

## 2019-04-03 PROCEDURE — 99215 OFFICE O/P EST HI 40 MIN: CPT | Mod: S$PBB,,, | Performed by: INTERNAL MEDICINE

## 2019-04-03 PROCEDURE — 99214 OFFICE O/P EST MOD 30 MIN: CPT | Mod: PBBFAC | Performed by: INTERNAL MEDICINE

## 2019-04-03 PROCEDURE — 36415 COLL VENOUS BLD VENIPUNCTURE: CPT

## 2019-04-03 PROCEDURE — 99999 PR PBB SHADOW E&M-EST. PATIENT-LVL IV: ICD-10-PCS | Mod: PBBFAC,,, | Performed by: INTERNAL MEDICINE

## 2019-04-03 PROCEDURE — 85060 PATHOLOGIST REVIEW: ICD-10-PCS | Mod: ,,, | Performed by: PATHOLOGY

## 2019-04-03 PROCEDURE — 85060 BLOOD SMEAR INTERPRETATION: CPT | Mod: ,,, | Performed by: PATHOLOGY

## 2019-04-03 PROCEDURE — 85027 COMPLETE CBC AUTOMATED: CPT

## 2019-04-03 PROCEDURE — 1125F PR PAIN SEVERITY QUANTIFIED, PAIN PRESENT: ICD-10-PCS | Mod: ,,, | Performed by: INTERNAL MEDICINE

## 2019-04-03 NOTE — PROGRESS NOTES
CC I feel  ok    Gera Doran is a 59 y.o.    Visit 59-year-old gentleman with chronic lymphocytic leukemia on observation alone    He is not having any drenching night sweats no fever and no weight loss.  He is concerned about his underlying diagnosis of leukemia in his care that he needs treatment for such he continues with depression and anxiety.  He reports continued anxiety is much better since he is taking the benzodiazepine  He is tolerating Flomax for BPH in his symptoms are improving as well.  Reiterated findings last scans including bilateral cervical adenopathy with superior mediastinal lymphadenopathy with maximum dimension of the abnormal lymph node at 11 mm as well as renal cysts.  Pt with chronic 4/10 pain from hip and back. He stopped bc powder because he developed an ulcer in his throat   He reports a headache every day : He believes this is from tension  Pt has been getting steroid injections in the back and hip for pain . His last injection was two days ago     Past Medical History:   Diagnosis Date    Arthritis     Asthma     Chemical exposure    Chemical exposure     TO LUNG    GERD (gastroesophageal reflux disease)     IC (interstitial cystitis)     Leukemia     RLS (restless legs syndrome)     Sleep apnea     NO MACHINE         Current Outpatient Medications:     albuterol 90 mcg/actuation inhaler, 2 puffs every 4 hours as needed for cough, wheeze, or shortness of breath, Disp: 3 Inhaler, Rfl: 3    aspirin-salicylamide-caffeine (BC HEADACHE POWDER) 650-195-32 mg Pack, Take 1 packet by mouth once daily., Disp: , Rfl:     catheter accessories, external Misc, 1 Device by Misc.(Non-Drug; Combo Route) route 2 (two) times daily as needed., Disp: 5 each, Rfl: 2    fluticasone (FLONASE) 50 mcg/actuation nasal spray, 2 sprays by Each Nare route as needed. (Patient taking differently: 2 sprays by Each Nare route as needed for Allergies. ), Disp: 1 Bottle, Rfl: 11     fluticasone-vilanterol (BREO ELLIPTA) 200-25 mcg/dose DsDv diskus inhaler, Inhale 1 puff into the lungs once daily. Controller, Disp: 3 each, Rfl: 3    gabapentin (NEURONTIN) 300 MG capsule, Take 1 capsule (300 mg total) by mouth every 8 (eight) hours as needed., Disp: 90 capsule, Rfl: 11    hydroCHLOROthiazide (HYDRODIURIL) 25 MG tablet, Take 1 tablet (25 mg total) by mouth once daily., Disp: 30 tablet, Rfl: 11    montelukast (SINGULAIR) 10 mg tablet, Take 1 tablet (10 mg total) by mouth every evening., Disp: 90 tablet, Rfl: 3    pantoprazole (PROTONIX) 40 MG tablet, Take 40 mg by mouth once daily. , Disp: , Rfl:     pentosan polysulfate (ELMIRON) 100 mg Cap, Take 1 capsule (100 mg total) by mouth 3 (three) times daily., Disp: 90 capsule, Rfl: 6    predniSONE (DELTASONE) 20 MG tablet, 3 for 3 days then 2 for 3 days then one for 3 days and repeat for breathing problems, Disp: 36 tablet, Rfl: 0    sertraline (ZOLOFT) 100 MG tablet, Take 50 mg by mouth every evening. 1/2 tablet daily, Disp: , Rfl:     albuterol-ipratropium 2.5mg-0.5mg/3mL (DUO-NEB) 0.5 mg-3 mg(2.5 mg base)/3 mL nebulizer solution, Take 3 mLs by nebulization every 6 (six) hours as needed. (Patient taking differently: Take 3 mLs by nebulization every 6 (six) hours as needed for Wheezing or Shortness of Breath. ), Disp: 120 vial, Rfl: 11    LORazepam (ATIVAN) 1 MG tablet, Take 1 tablet (1 mg total) by mouth every 12 (twelve) hours as needed for Anxiety., Disp: 90 tablet, Rfl: 0    tamsulosin (FLOMAX) 0.4 mg Cp24, Take 1 capsule (0.4 mg total) by mouth once daily., Disp: 30 capsule, Rfl: 11    CONSTITUTIONAL: No fevers, chills, night sweats, wt. loss  SKIN: no rashes or itching  ENT:  +  Headaches,no  head trauma, vision changes, or eye pain  LYMPH NODES: palpable cervical nodes   CV: No chest pain, palpitations.   RESP: No dyspnea on exertion, cough, wheezing, or hemoptysis  GI: No nausea, emesis, diarrhea, constipation  : BPH and symptoms  "  HEME: No easy bruising, bleeding problems  PSYCHIATRIC: No depression, No anxiety  nopsychosis, hallucinations.  NEURO: No seizures, memory loss no further  weakness and dizziness  MSK: No arthralgias or joint swelling       /76   Pulse (!) 53   Temp 98 °F (36.7 °C) (Oral)   Resp 16   Ht 5' 11" (1.803 m)   Wt 89.4 kg (197 lb)   BMI 27.48 kg/m²     Constitutional: oriented to person, place, and time.  well-developed and well-nourished.   HENT:   Head: Normocephalic and atraumatic.   Right Ear: External ear normal.Left Ear: External ear normal.   Nose: Nose normal.   Mouth/Throat: Oropharynx is clear and moist.   Eyes: Conjunctivae and EOM are normal. Pupils are equal, round, and reactive to light.   Neck: Normal range of motion. Neck supple. No thyromegaly present.   Cardiovascular: Normal rate, regular rhythm, normal heart sounds and intact distal pulses.    No murmur heard.  Pulmonary/Chest: Effort normal and breath sounds normal.  no wheezes.  no rales.   Abdominal: Soft. Bowel sounds are normal.  no mass. There is no tenderness. There is no rebound.   Musculoskeletal: Normal range of motion.  ++  edema no  tenderness.   Lymphadenopathy:  no cervical adenopathy.   Neurological: alert and oriented to person, place, and time. normal reflexes. No cranial nerve deficit.   Skin: Skin is warm and dry. No rash noted.   Psychiatric: flat mood and affect.   behavior is normal.   Vitals reviewed.    Lab Results   Component Value Date    WBC 37.77 (H) 04/03/2019    RBC 3.93 (L) 04/03/2019    HGB 12.3 (L) 04/03/2019    HCT 38.1 (L) 04/03/2019    MCV 97 04/03/2019    MCH 31.3 (H) 04/03/2019    MCHC 32.3 04/03/2019    RDW 13.6 04/03/2019     (L) 04/03/2019    MPV 9.8 04/03/2019    GRAN 10.0 (L) 04/03/2019    LYMPH 85.0 (H) 04/03/2019    MONO 3.0 (L) 04/03/2019    EOS CANCELED 05/30/2018    BASO CANCELED 05/30/2018    EOSINOPHIL 1.0 04/03/2019    BASOPHIL 1.0 04/03/2019       Ref Range & Units11:25  WBC3.90 " - 12.70 K/uL24.80 Abnormally high  RBC4.60 - 6.20 M/uL4.32 Abnormally low  Pgqfozzxcj97.0 - 18.0 g/dL13.3 Abnormally low  Pauciardci43.0 - 54.0 %40.7 MCV82 - 98 fL94 MCH27.0 - 31.0 pg30.8 MCHC32.0 - 36.0 g/dL32.7 RDW11.5 - 14.5 %14.0 Gupmvbbng749 - 350 K/uL160 MPV9.2 - 12.9 fL7.7 Abnormally low  Gran%38.0 - 73.0 %14.0 Abnormally low  Lymph%18.0 - 48.0 %82.0 Abnormally high  Mono%4.0 - 15.0 %3.0 Abnormally low  Eosinophil%0.0 - 8.0 %1.0 Basophil%0.0 - 1.9 %0.0 Platelet EstimateAppears normal HypoOccasional       Pathologist Review Peripheral Smear REVIEWED   Comments: Electronically reviewed and signed by:   Regi Yang M.D.   Signed on 01/23/17 at 13:41   PATHOLOGIST INTERPRETATION   WBC- Leukocytosis with an absolute lymphocytosis.  Lymphocytes are   mature and slightly atypical.  Smudge cells are increased.  The   morphology is suggestive of CLL.  Recommend flow cytometry of   peripheral blood ([id number])for confirmation.   RBC- Mild normocytic anemia without significant morphologic   abnormalities.   PLT- Normal in number and morphology.   Interpreted by Regi Yang M.D.      Resulting Agency [facility]       Lymphocytes decreased from 56765 to 08838    CMP    CT Soft Tissue Neck With Contrast 02/09/2017 None Specified          RESULTS:  CT neck, chest, abdomen, and pelvis with contrast     No comparison     Technique: Axial images through the neck, chest, abdomen, and pelvis were acquired following intravenous administration of 100 mL Omnipaque 350. Multiplanar reformatted images were created.     FINDINGS:     CT NECK: There is mild, symmetric bilateral cervical lymphadenopathy, with a right level III lymph node measuring 11 mm short axis representing one of the larger nodes. The airway is midline and patent. The thyroid, submandibular, and parotid glands are unremarkable. Major vascular structures of the neck are patent. Mild C5-6 degenerative disc changes noted. No suspicious osseous lesions.     CT  CHEST: There is a mildly enlarged superior mediastinal lymph node measuring 11 mm located posterior to the trachea just below the thoracic inlet. Otherwise, there are no enlarged hilar, mediastinal, or axillary lymph nodes.     The heart size is normal without pericardial effusion. The thoracic aorta is normal caliber. The lungs are clear. There is no pleural effusion. There are no suspicious osseous lesions.     CT ABDOMEN and PELVIS: There is no venkata abdominal or pelvic lymphadenopathy. There is a prominent 8mm lymph node anterior to the aortic hiatus.     The liver and spleen are normal in size. The gallbladder, pancreas, and adrenal glands are unremarkable.     There are 3 lesions of the right kidney exhibiting density above that to allow for characterization of simple cysts. These measure 2.9, 2.7, and 0.9 cm, respectively. A small cyst of the left kidney is noted.     The small bowel is normal in caliber and appearance. There is moderate diverticulosis coli. Small hiatal hernia is present.     There are no suspicious osseous lesions.  IMPRESSION:      1. Mild cervical lymphadenopathy.  2. Mildly enlarged superior mediastinal lymph node with otherwise no lymphadenopathy within the chest, abdomen, or pelvis.  3. Indeterminant right renal lesions for which further evaluation with renal ultrasound is recommended.           Electronically signed by: Hayden Marshall MD     Pt had retroperitoneal ultrasound that showed renal mass vs complex cyst for which pt has follow up with Dr Lovell  Beta 2 normal  And LDH normal      Renal cyst  -     MRI Abdomen-Pelvis w w/o Contrast (XPD); Future; Expected date: 04/03/2019    CLL (chronic lymphocytic leukemia)    Chronic pain syndrome    Thrombocytopenia    Anemia, unspecified type         1 + pitting edema : decrease salt intake   Steroids can cause fluid retention  RTC after scan   He is getting steroid injections that are adding to the lymphocytosis   He is also on  flonase which also adds to lymphocytosis  He should not have injections sooner than every 6 weeks and not more than 3-4 times a year  This increases his risk of osteoporosis   Pt may continue lorazepam for insomnia and anxiety  Lymphocytes are rising possibly due to a combination  Pt given the diagnosis and staging as a stage 3 CLL due to lymphocytosis and anemia  Continue follow with Dr. De La Cruz for BPH  Recommend increasing activity yoga, pilates   4/ 10 pain in back and hips : he is seeing a pain specialist   He does have a history of chemical lung disease 'Pt needs intervention by ortho or neurosurgery to ascertain his options regarding arthritic changes in his back and joints   He will need imaging in the future for reassessment of a questionable indeterminate lesion in the right kidney.  Time radiology felt this could possibly be related to his multiple renal cysts.  Recheck in a couple of months.  Patient remains asymptomatic at this time        Thank you for allowing me to evaluate and participate in the care of this pleasant patient. Please fell free to call me with any questions or concerns.    Warmly,  Leilani Simon MD    This note was dictated with Dragon and briefly proofread. Please excuse any sentences that may be unclear or words misspelled

## 2019-04-04 ENCOUNTER — TELEPHONE (OUTPATIENT)
Dept: HEMATOLOGY/ONCOLOGY | Facility: CLINIC | Age: 59
End: 2019-04-04

## 2019-04-04 DIAGNOSIS — C91.10 CLL (CHRONIC LYMPHOCYTIC LEUKEMIA): Primary | ICD-10-CM

## 2019-04-04 LAB
PATH REV BLD -IMP: NORMAL
PATH REV BLD -IMP: NORMAL

## 2019-04-05 ENCOUNTER — HOSPITAL ENCOUNTER (OUTPATIENT)
Dept: RADIOLOGY | Facility: HOSPITAL | Age: 59
Discharge: HOME | End: 2019-04-05
Attending: INTERNAL MEDICINE
Payer: MEDICARE

## 2019-04-05 DIAGNOSIS — N28.1 RENAL CYST: ICD-10-CM

## 2019-04-05 PROCEDURE — 72197 MRI ABDOMEN-PELVIS W W/O CONTRAST (XPD): ICD-10-PCS | Mod: 26,,, | Performed by: RADIOLOGY

## 2019-04-05 PROCEDURE — 74183 MRI ABD W/O CNTR FLWD CNTR: CPT | Mod: 26,,, | Performed by: RADIOLOGY

## 2019-04-05 PROCEDURE — 72197 MRI PELVIS W/O & W/DYE: CPT | Mod: TC

## 2019-04-05 PROCEDURE — 72197 MRI PELVIS W/O & W/DYE: CPT | Mod: 26,,, | Performed by: RADIOLOGY

## 2019-04-05 PROCEDURE — 25500020 PHARM REV CODE 255: Performed by: INTERNAL MEDICINE

## 2019-04-05 PROCEDURE — A9585 GADOBUTROL INJECTION: HCPCS | Performed by: INTERNAL MEDICINE

## 2019-04-05 PROCEDURE — 74183 MRI ABDOMEN-PELVIS W W/O CONTRAST (XPD): ICD-10-PCS | Mod: 26,,, | Performed by: RADIOLOGY

## 2019-04-05 RX ORDER — GADOBUTROL 604.72 MG/ML
9 INJECTION INTRAVENOUS
Status: COMPLETED | OUTPATIENT
Start: 2019-04-05 | End: 2019-04-05

## 2019-04-05 RX ADMIN — GADOBUTROL 9 ML: 604.72 INJECTION INTRAVENOUS at 02:04

## 2019-04-10 ENCOUNTER — OFFICE VISIT (OUTPATIENT)
Dept: HEMATOLOGY/ONCOLOGY | Facility: CLINIC | Age: 59
End: 2019-04-10
Payer: MEDICARE

## 2019-04-10 VITALS
HEART RATE: 61 BPM | TEMPERATURE: 98 F | DIASTOLIC BLOOD PRESSURE: 77 MMHG | BODY MASS INDEX: 26.46 KG/M2 | WEIGHT: 189 LBS | SYSTOLIC BLOOD PRESSURE: 125 MMHG | HEIGHT: 71 IN

## 2019-04-10 DIAGNOSIS — D64.9 ANEMIA, UNSPECIFIED TYPE: ICD-10-CM

## 2019-04-10 DIAGNOSIS — D69.6 THROMBOCYTOPENIA: ICD-10-CM

## 2019-04-10 DIAGNOSIS — N28.1 RENAL CYST, RIGHT: ICD-10-CM

## 2019-04-10 DIAGNOSIS — C91.10 CLL (CHRONIC LYMPHOCYTIC LEUKEMIA): Primary | ICD-10-CM

## 2019-04-10 PROCEDURE — 99999 PR PBB SHADOW E&M-EST. PATIENT-LVL IV: ICD-10-PCS | Mod: PBBFAC,,, | Performed by: INTERNAL MEDICINE

## 2019-04-10 PROCEDURE — 99214 OFFICE O/P EST MOD 30 MIN: CPT | Mod: S$PBB,,, | Performed by: INTERNAL MEDICINE

## 2019-04-10 PROCEDURE — 99999 PR PBB SHADOW E&M-EST. PATIENT-LVL IV: CPT | Mod: PBBFAC,,, | Performed by: INTERNAL MEDICINE

## 2019-04-10 PROCEDURE — 99214 PR OFFICE/OUTPT VISIT, EST, LEVL IV, 30-39 MIN: ICD-10-PCS | Mod: S$PBB,,, | Performed by: INTERNAL MEDICINE

## 2019-04-10 PROCEDURE — 99214 OFFICE O/P EST MOD 30 MIN: CPT | Mod: PBBFAC | Performed by: INTERNAL MEDICINE

## 2019-04-10 NOTE — PROGRESS NOTES
CC I am hurting in my back     Gera Doran is a 59 y.o.    Visit 59-year-old gentleman with chronic lymphocytic leukemia on observation alone    He is not having any drenching night sweats no fever and no weight loss.  His anxiety is controlled with  benzodiazepines  He is tolerating Flomax for BPH in his symptoms are improving as well.  Abnormal CT scan in past  Revealing bilateral cervical adenopathy with superior mediastinal lymphadenopathy with maximum dimension of the abnormal lymph node at 11 mm as well as renal cysts.  Pt with chronic 4/10 pain from hip and back. He stopped bc powder because he developed an ulcer in his throat       Past Medical History:   Diagnosis Date    Arthritis     Asthma     Chemical exposure    Chemical exposure     TO LUNG    GERD (gastroesophageal reflux disease)     IC (interstitial cystitis)     Leukemia     RLS (restless legs syndrome)     Sleep apnea     NO MACHINE         Current Outpatient Medications:     albuterol 90 mcg/actuation inhaler, 2 puffs every 4 hours as needed for cough, wheeze, or shortness of breath, Disp: 3 Inhaler, Rfl: 3    albuterol-ipratropium 2.5mg-0.5mg/3mL (DUO-NEB) 0.5 mg-3 mg(2.5 mg base)/3 mL nebulizer solution, Take 3 mLs by nebulization every 6 (six) hours as needed. (Patient taking differently: Take 3 mLs by nebulization every 6 (six) hours as needed for Wheezing or Shortness of Breath. ), Disp: 120 vial, Rfl: 11    aspirin-salicylamide-caffeine (BC HEADACHE POWDER) 650-195-32 mg Pack, Take 1 packet by mouth once daily., Disp: , Rfl:     catheter accessories, external Misc, 1 Device by Misc.(Non-Drug; Combo Route) route 2 (two) times daily as needed., Disp: 5 each, Rfl: 2    fluticasone (FLONASE) 50 mcg/actuation nasal spray, 2 sprays by Each Nare route as needed. (Patient taking differently: 2 sprays by Each Nare route as needed for Allergies. ), Disp: 1 Bottle, Rfl: 11    fluticasone-vilanterol (BREO ELLIPTA) 200-25 mcg/dose  "DsDv diskus inhaler, Inhale 1 puff into the lungs once daily. Controller, Disp: 3 each, Rfl: 3    gabapentin (NEURONTIN) 300 MG capsule, Take 1 capsule (300 mg total) by mouth every 8 (eight) hours as needed., Disp: 90 capsule, Rfl: 11    hydroCHLOROthiazide (HYDRODIURIL) 25 MG tablet, Take 1 tablet (25 mg total) by mouth once daily., Disp: 30 tablet, Rfl: 11    LORazepam (ATIVAN) 1 MG tablet, Take 1 tablet (1 mg total) by mouth every 12 (twelve) hours as needed for Anxiety., Disp: 90 tablet, Rfl: 0    montelukast (SINGULAIR) 10 mg tablet, Take 1 tablet (10 mg total) by mouth every evening., Disp: 90 tablet, Rfl: 3    pantoprazole (PROTONIX) 40 MG tablet, Take 40 mg by mouth once daily. , Disp: , Rfl:     pentosan polysulfate (ELMIRON) 100 mg Cap, Take 1 capsule (100 mg total) by mouth 3 (three) times daily., Disp: 90 capsule, Rfl: 6    predniSONE (DELTASONE) 20 MG tablet, 3 for 3 days then 2 for 3 days then one for 3 days and repeat for breathing problems, Disp: 36 tablet, Rfl: 0    sertraline (ZOLOFT) 100 MG tablet, Take 50 mg by mouth every evening. 1/2 tablet daily, Disp: , Rfl:     tamsulosin (FLOMAX) 0.4 mg Cp24, Take 1 capsule (0.4 mg total) by mouth once daily., Disp: 30 capsule, Rfl: 11    CONSTITUTIONAL: No fevers, chills,  wt. loss  SKIN: no rashes or itching  ENT:  +  Headaches,no  head trauma, vision changes, or eye pain  LYMPH NODES: palpable cervical nodes   CV: No chest pain, palpitations.   RESP: No dyspnea on exertion, cough,  hemoptysis  GI: No nausea, emesis, diarrhea, constipation  : BPH and symptoms   HEME: No easy bruising, bleeding problems  PSYCHIATRIC: No depression, No anxiety  nopsychosis, hallucinations.  NEURO: No seizures, memory loss no further  weakness and dizziness  MSK: No arthralgias Positive back pain and he has seen pain specialists for such 4/10   No falls         /77   Pulse 61   Temp 98 °F (36.7 °C) (Oral)   Ht 5' 11" (1.803 m)   Wt 85.7 kg (189 lb)   " BMI 26.36 kg/m²     Constitutional: oriented to person, place, and time.  well-developed and well-nourished.   HENT:   Head: Normocephalic and atraumatic.   Right Ear: External ear normal.Left Ear: External ear normal.   Nose: Nose normal.   Mouth/Throat: Oropharynx is clear and moist.   Eyes: Conjunctivae and EOM are normal. Pupils are equal,and reactive to light.   Neck: Normal range of motion. \ No thyromegaly present.   Cardiovascular: Normal rate, regular rhythm, normal heart sounds   No murmur heard.  Pulmonary/Chest: Effort normal and breath sounds normal.  no wheezes.  no rales.   Abdominal: Soft. Bowel sounds are normal.  no mass. There is no tenderness.   Musculoskeletal: Normal range of motion.  Continued edema   Lymphadenopathy:  no cervical adenopathy.   Neurological: alert and oriented to person, place, and time.   Skin: Skin is warm and dry. No rash noted.   Psychiatric: pleasant mood and affect.   behavior is normal.   Vitals reviewed.    Lab Results   Component Value Date    WBC 37.77 (H) 04/03/2019    RBC 3.93 (L) 04/03/2019    HGB 12.3 (L) 04/03/2019    HCT 38.1 (L) 04/03/2019    MCV 97 04/03/2019    MCH 31.3 (H) 04/03/2019    MCHC 32.3 04/03/2019    RDW 13.6 04/03/2019     (L) 04/03/2019    MPV 9.8 04/03/2019    GRAN 10.0 (L) 04/03/2019    LYMPH 85.0 (H) 04/03/2019    MONO 3.0 (L) 04/03/2019    EOS CANCELED 05/30/2018    BASO CANCELED 05/30/2018    EOSINOPHIL 1.0 04/03/2019    BASOPHIL 1.0 04/03/2019       Ref Range & Units11:25  WBC3.90 - 12.70 K/uL24.80 Abnormally high  RBC4.60 - 6.20 M/uL4.32 Abnormally low  Jvltrecdzl58.0 - 18.0 g/dL13.3 Abnormally low  Uicjvbfceh10.0 - 54.0 %40.7 MCV82 - 98 fL94 MCH27.0 - 31.0 pg30.8 MCHC32.0 - 36.0 g/dL32.7 RDW11.5 - 14.5 %14.0 Xlpyxjiny136 - 350 K/uL160 MPV9.2 - 12.9 fL7.7 Abnormally low  Gran%38.0 - 73.0 %14.0 Abnormally low  Lymph%18.0 - 48.0 %82.0 Abnormally high  Mono%4.0 - 15.0 %3.0 Abnormally low  Eosinophil%0.0 - 8.0 %1.0 Basophil%0.0 - 1.9  %0.0 Platelet EstimateAppears normal HypoOccasional       Pathologist Review Peripheral Smear REVIEWED   Comments: Electronically reviewed and signed by:   Regi Yang M.D.   Signed on 01/23/17 at 13:41   PATHOLOGIST INTERPRETATION   WBC- Leukocytosis with an absolute lymphocytosis.  Lymphocytes are   mature and slightly atypical.  Smudge cells are increased.  The   morphology is suggestive of CLL.  Recommend flow cytometry of   peripheral blood ([id number])for confirmation.   RBC- Mild normocytic anemia without significant morphologic   abnormalities.   PLT- Normal in number and morphology.   Interpreted by Regi Yang M.D.      Resulting Agency [facility]     MRI Abdomen-Pelvis w w/o Contrast (XPD)   Order: [id number]   Status:  Final result   Visible to patient:  Yes (Patient Portal) Next appt:  04/18/2019 at 09:00 AM in Neurosurgery (John Giles MD) Dx:  Renal cyst   Details     Reading Physician Reading Date Result Priority   Hayden Rice MD 4/5/2019 Routine      Narrative     EXAMINATION:  MRI ABDOMEN-PELVIS W W/O CONTRAST (XPD)    CLINICAL HISTORY:  renal right exophytic cysts;  Cyst of kidney, acquired    TECHNIQUE:  Multiplanar, multisequence pre and post-contrast images were obtained through the abdomen and pelvis with the use of 9 cc of intravenous Gadavist.  Patient tolerated procedure without adverse reaction    COMPARISON:  MRI of the abdomen 07/20/2017.  CT abdomen pelvis 02/29/2017.    FINDINGS:  The liver is normal in size and signal intensity.  No focal hepatic lesion.  No perihepatic fluid.  Gallbladder is normally distended.  No intra or extrahepatic biliary ductal dilatation.    The spleen is normal in size and signal intensities.  Small 6 mm cyst of the spleen.  The pancreas is normal in size and signal intensity.  No peripancreatic inflammatory change.  No pancreatic ductal dilatation.  The adrenal glands are unremarkable.    Kidneys are normal in size and signal intensity.  There  are bilateral T2 hyperintense cysts the largest on the left measuring 2.7 cm in diameter.  Within the medial midpole of the right kidney there is a chronic heterogeneous T2 hyperintense complicated cyst measuring 3.8 x 2.3 cm slightly increased in size over the interval (previously measuring 3.2 x 2.1 cm.  This complicated cyst demonstrates mild postcontrast enhancement.  No changes of hydronephrosis.  No perinephric inflammatory change.    Air and stool throughout the loops of colon.  Scattered diverticula within the distal colon.  No MRI evidence to suggest a bowel obstruction.  The bladder is distended.  Prostate is enlarged measuring 3.8 cm AP x 5.2 cm transverse.  Seminal vesicles and rectum unremarkable.    No significant mesenteric or retroperitoneal lymphadenopathy.  No significant ascites.      Impression       1. Small splenic cyst.  2. Chronic bilateral simple renal cysts.  3. Chronic complicated cyst of the right kidney which has slightly increased in size over the interval.  Continued follow-up is recommended.  4. Diverticulosis.  5. Prostatic hypertrophy.      Electronically signed by: Hayden Rice  Date: 04/05/2019             Pt had retroperitoneal ultrasound that showed renal mass vs complex cyst for which pt has follow up with Dr Lovell  Beta 2 normal  And LDH normal      CLL (chronic lymphocytic leukemia)  -     CBC auto differential; Future; Expected date: 04/10/2019  -     Comprehensive metabolic panel; Future; Expected date: 04/10/2019    Renal cyst, right    Anemia, unspecified type    Thrombocytopenia         To Neurosurgeon to discuss options of treatment   Pt may continue lorazepam for insomnia and anxiety  WBC stable , anemia and thrombocytopenia stable  Avoid NSAIDS  For now as this can add to marrow suppression   To his urologist for right renal exophytic cyst   RTC 6 months with labs to watch for progression  He understands he may be immunocompromised due to his CLL which can be  associated with low IGG hence if he ever develops an infection that does not respond well to antibiotics he may need IVIG  Pt given the diagnosis and staging as a stage 3 CLL due to lymphocytosis and anemia      Thank you for allowing me to evaluate and participate in the care of this pleasant patient. Please fell free to call me with any questions or concerns.    Warmly,  Leilani Simon MD    This note was dictated with Dragon and briefly proofread. Please excuse any sentences that may be unclear or words misspelled

## 2019-04-18 ENCOUNTER — OFFICE VISIT (OUTPATIENT)
Dept: NEUROSURGERY | Facility: CLINIC | Age: 59
End: 2019-04-18
Payer: MEDICARE

## 2019-04-18 VITALS
SYSTOLIC BLOOD PRESSURE: 131 MMHG | HEART RATE: 60 BPM | BODY MASS INDEX: 26.62 KG/M2 | DIASTOLIC BLOOD PRESSURE: 79 MMHG | HEIGHT: 71 IN | WEIGHT: 190.13 LBS

## 2019-04-18 DIAGNOSIS — S76.301D HAMSTRING INJURY, RIGHT, SUBSEQUENT ENCOUNTER: Primary | ICD-10-CM

## 2019-04-18 DIAGNOSIS — M47.816 LUMBAR SPONDYLOSIS: ICD-10-CM

## 2019-04-18 PROCEDURE — 99214 OFFICE O/P EST MOD 30 MIN: CPT | Mod: S$GLB,,, | Performed by: STUDENT IN AN ORGANIZED HEALTH CARE EDUCATION/TRAINING PROGRAM

## 2019-04-18 PROCEDURE — 99214 PR OFFICE/OUTPT VISIT, EST, LEVL IV, 30-39 MIN: ICD-10-PCS | Mod: S$GLB,,, | Performed by: STUDENT IN AN ORGANIZED HEALTH CARE EDUCATION/TRAINING PROGRAM

## 2019-04-18 NOTE — LETTER
April 18, 2019      Leilani Simon MD  [address]  [place] [address]           [address]  [place] [address]  Phone: [phone]          Patient: Gera Doran   MR Number: [id number]   YOB: 1960   Date of Visit: 4/18/2019       Dear Dr. Leilani Simon:    Thank you for referring Gera Doran to me for evaluation. Attached you will find relevant portions of my assessment and plan of care.    If you have questions, please do not hesitate to call me. I look forward to following Gera Doran along with you.    Sincerely,    John Giles MD    Enclosure  CC:  No Recipients    If you would like to receive this communication electronically, please contact [email or web address] or [phone] to request more information on [facility] Link access.    For providers and/or their staff who would like to refer a patient to [facility], please contact us through our one-stop-shop provider referral line, [facility], at [phone].    If you feel you have received this communication in error or would no longer like to receive these types of communications, please e-mail [email or web address]

## 2019-04-18 NOTE — PROGRESS NOTES
[place] - Neurosurgery  Clinic Consult     Consult Requested By: Leilani Simon MD  PCP: Eusebio Jones MD    SUBJECTIVE:     Chief Complaint:   Chief Complaint   Patient presents with    Lumbar Spine Pain (L-Spine)     low back pain that radiates into the right hip; denies numbness and tingling; pain 8/10       History of Present Illness:  Gera Doran is a 59 y.o. male with CLL not currently receiving treatment who presents for evaluation of chronic right SI region/buttock pain. Patient reports history of right hamstring injury and sees sports medicine in [place]. Patient complains of pain in the right buttock/ sacral region with radiation into the right buttock and proximal right posterior thigh. Denies numbness, tingling, weakness in legs. Denies bowel/bladder dysfunction. The pain is constant. Worse with sitting, especially while driving, and lying flat. He has received injections with pain management in [place] with only a few days relief. He last attended physical therapy several months ago.  Pain     VAS 8/10  PHQ 14  Oswestry Disability Index 44    Past Medical History:   Diagnosis Date    Arthritis     Asthma     Chemical exposure    Chemical exposure     TO LUNG    GERD (gastroesophageal reflux disease)     IC (interstitial cystitis)     Leukemia     RLS (restless legs syndrome)     Sleep apnea     NO MACHINE     Past Surgical History:   Procedure Laterality Date    APPENDECTOMY      CYSTOSCOPY      CYSTOSCOPY WITH HYDRODISTENSION N/A 3/12/2018    [facility]    CYSTOSCOPY WITH HYDRODISTENSION N/A 1/23/2017    [facility]    CYSTOSCOPY WITH HYDRODISTENSION N/A 2/10/2014    [facility]    ESOPHAGOGASTRODUODENOSCOPY      HERNIA REPAIR      INJECTION-BOTOX N/A 3/12/2018    [facility] OR    SINUS SURGERY      stomach surgery for GERD       TONSILLECTOMY      tonsils       History reviewed. No pertinent family history.  Social History     Tobacco Use    Smoking status: Never Smoker    Smokeless tobacco: Current User     Types: Chew   Substance Use Topics    Alcohol use: Yes     Comment: OCC    Drug use: No      Review of patient's allergies indicates:   Allergen Reactions    Imitrex [sumatriptan succinate] Itching    Sulfa (sulfonamide antibiotics) Itching    Amoxicillin Itching       Current Outpatient Medications:     albuterol 90 mcg/actuation inhaler, 2 puffs every 4 hours as needed for cough, wheeze, or shortness of breath, Disp: 3 Inhaler, Rfl: 3    fluticasone (FLONASE) 50 mcg/actuation nasal spray, 2 sprays by Each Nare route as needed. (Patient taking differently: 2 sprays by Each Nare route as needed for Allergies. ), Disp: 1 Bottle, Rfl: 11    fluticasone-vilanterol (BREO ELLIPTA) 200-25 mcg/dose DsDv diskus inhaler, Inhale 1 puff into the lungs once daily. Controller, Disp: 3 each, Rfl: 3    montelukast (SINGULAIR) 10 mg tablet, Take 1 tablet (10 mg total) by mouth every evening., Disp: 90 tablet, Rfl: 3    pantoprazole (PROTONIX) 40 MG tablet, Take 40 mg by mouth once daily. , Disp: , Rfl:     predniSONE (DELTASONE) 20 MG tablet, 3 for 3 days then 2 for 3 days then one for 3 days and repeat for breathing problems, Disp: 36 tablet, Rfl: 0    albuterol-ipratropium 2.5mg-0.5mg/3mL (DUO-NEB) 0.5 mg-3 mg(2.5 mg base)/3 mL nebulizer solution, Take 3 mLs by nebulization every 6 (six) hours as needed. (Patient taking differently: Take 3 mLs by nebulization every 6 (six) hours as needed for Wheezing or Shortness of Breath. ), Disp: 120 vial, Rfl: 11    catheter accessories, external Misc, 1 Device by Misc.(Non-Drug; Combo Route) route 2 (two) times daily as needed., Disp: 5 each, Rfl: 2    gabapentin (NEURONTIN) 300 MG capsule, Take 1 capsule (300 mg total) by mouth every 8 (eight) hours as needed., Disp: 90 capsule, Rfl: 11     hydroCHLOROthiazide (HYDRODIURIL) 25 MG tablet, Take 1 tablet (25 mg total) by mouth once daily., Disp: 30 tablet, Rfl: 11    LORazepam (ATIVAN) 1 MG tablet, Take 1 tablet (1 mg total) by mouth every 12 (twelve) hours as needed for Anxiety., Disp: 90 tablet, Rfl: 0    pentosan polysulfate (ELMIRON) 100 mg Cap, Take 1 capsule (100 mg total) by mouth 3 (three) times daily., Disp: 90 capsule, Rfl: 6    sertraline (ZOLOFT) 100 MG tablet, Take 50 mg by mouth every evening. 1/2 tablet daily, Disp: , Rfl:     tamsulosin (FLOMAX) 0.4 mg Cp24, Take 1 capsule (0.4 mg total) by mouth once daily., Disp: 30 capsule, Rfl: 11    Review of Systems:   Constitutional: no fever, chills or night sweats. No changes in weight   Eyes: no visual changes   ENT: no nasal congestion or sore throat   Respiratory: no cough or shortness of breath   Cardiovascular: no chest pain or palpitations   Gastrointestinal: no nausea or vomiting   Genitourinary: no hematuria or dysuria   Integument/Breast: no rash or pruritis   Hematologic/Lymphatic: CLL   Musculoskeletal: +myalgias   Neurological: no seizures or tremors   Behavioral/Psych: no auditory or visual hallucinations   Endocrine: no heat or cold intolerance       OBJECTIVE:     Vital Signs (Most Recent):  Pulse: 60 (04/18/19 0916)  BP: 131/79 (04/18/19 0916)    Physical Exam:   General: well developed, well nourished, no distress.   Neurologic: Alert and oriented. Thought content appropriate. GCS 15.   Head: normocephalic, atraumatic  Eyes: EOMI  Neck: trachea midline, no JVD   Cardiovascular: no LE edema  Pulmonary: normal respirations, no signs of respiratory distress  Abdomen: non-distended  Sensory: intact to light touch throughout  Skin: Skin is warm, dry and intact.    Motor Strength: Moves all extremities spontaneously with good tone. No abnormal movements seen.     Strength  Deltoids Triceps Biceps Wrist Extension Wrist Flexion Hand  Interossei    Upper: R 5/5 5/5 5/5 5/5 5/5 5/5  "5/5     L 5/5 5/5 5/5 5/5 5/5 5/5 5/5      Iliopsoas Quadriceps Knee  Flexion Tibialis  anterior Gastro- cnemius EHL Foot Inversion Foot Eversion   Lower: R 5/5 5/5 5/5 5/5 5/5 5/5 5/5 5/5    L 5/5 5/5 5/5 5/5 5/5 5/5 5/5 5/5     DTR's: 2 + and symmetric in UE and LE  Clonus: absent    Gait: normal    Tandem Gait: No difficulty           Able to walk on heels & toes   Lumbar Spine: full ROM, no TTP  Straight leg raise: negative bilaterally   SI Joint tenderness: positive right  + TTP at ischium attachment  CHAU: Negative bilaterally          Diagnostic Results:  I have independently reviewed the following imaging:xrays and  MRI: Reviewed  2017, xrays mild lumbar spondylosis without disc herniation, deformity or severe neural compression    ASSESSMENT/PLAN:     Hamstring injury, right, subsequent encounter    Lumbar spondylosis        Gera Doran is a 59 y.o. male  With history right hamstring tendon injury with transient response to injections; without benefit of intervention to "break up scar tissue"  Pain with pressure, sitting;   He is most likely symptomatic from his chronic hamstring injury  Possible right SI joint involvement  Less likely Lumbar S1 radiculopathy, His 2017 Lumbar MRI is very is unremarkable and he has had zero benefit from Lumbar ANDREEA    Due to the severity and frequency of symptoms he is indicated for additional workup  MRI ordered will call with results   He will need to f/u with sports medicine/ortho for management if Lumbar MRI is unchanged  Consider right SI joint injection for diagnostic/therapuetic puroposes      Patient verbalized understanding of plan. Encouraged to call with any questions or concerns.     This note was partially dictated using voice recognition software, so please excuse any errors that were not corrected.    "

## 2019-04-22 ENCOUNTER — OFFICE VISIT (OUTPATIENT)
Dept: UROLOGY | Facility: CLINIC | Age: 59
End: 2019-04-22
Payer: MEDICARE

## 2019-04-22 ENCOUNTER — LAB VISIT (OUTPATIENT)
Dept: LAB | Facility: HOSPITAL | Age: 59
End: 2019-04-22
Attending: UROLOGY
Payer: MEDICARE

## 2019-04-22 VITALS
DIASTOLIC BLOOD PRESSURE: 79 MMHG | HEART RATE: 68 BPM | TEMPERATURE: 98 F | WEIGHT: 193.31 LBS | SYSTOLIC BLOOD PRESSURE: 126 MMHG | BODY MASS INDEX: 27.06 KG/M2 | RESPIRATION RATE: 18 BRPM | HEIGHT: 71 IN

## 2019-04-22 DIAGNOSIS — N40.0 BENIGN PROSTATIC HYPERPLASIA WITHOUT LOWER URINARY TRACT SYMPTOMS: ICD-10-CM

## 2019-04-22 DIAGNOSIS — N28.1 COMPLEX RENAL CYST: ICD-10-CM

## 2019-04-22 DIAGNOSIS — R35.0 URINARY FREQUENCY: Primary | ICD-10-CM

## 2019-04-22 DIAGNOSIS — R39.12 WEAK URINE STREAM: ICD-10-CM

## 2019-04-22 LAB
BILIRUB SERPL-MCNC: ABNORMAL MG/DL
BLOOD URINE, POC: ABNORMAL
COLOR, POC UA: YELLOW
COMPLEXED PSA SERPL-MCNC: 3.1 NG/ML (ref 0–4)
GLUCOSE UR QL STRIP: ABNORMAL
KETONES UR QL STRIP: ABNORMAL
LEUKOCYTE ESTERASE URINE, POC: ABNORMAL
NITRITE, POC UA: ABNORMAL
PH, POC UA: 5.5
POC RESIDUAL URINE VOLUME: 51 ML (ref 0–100)
PROTEIN, POC: ABNORMAL
SPECIFIC GRAVITY, POC UA: 1.02
UROBILINOGEN, POC UA: 0.2

## 2019-04-22 PROCEDURE — 36415 COLL VENOUS BLD VENIPUNCTURE: CPT

## 2019-04-22 PROCEDURE — 99215 OFFICE O/P EST HI 40 MIN: CPT | Mod: PBBFAC,PN | Performed by: NURSE PRACTITIONER

## 2019-04-22 PROCEDURE — 84153 ASSAY OF PSA TOTAL: CPT

## 2019-04-22 PROCEDURE — 81002 URINALYSIS NONAUTO W/O SCOPE: CPT | Mod: PBBFAC,PN | Performed by: NURSE PRACTITIONER

## 2019-04-22 PROCEDURE — 51798 US URINE CAPACITY MEASURE: CPT | Mod: PBBFAC,PN | Performed by: NURSE PRACTITIONER

## 2019-04-22 PROCEDURE — 99214 PR OFFICE/OUTPT VISIT, EST, LEVL IV, 30-39 MIN: ICD-10-PCS | Mod: S$PBB,,, | Performed by: NURSE PRACTITIONER

## 2019-04-22 PROCEDURE — 99999 PR PBB SHADOW E&M-EST. PATIENT-LVL V: CPT | Mod: PBBFAC,,, | Performed by: NURSE PRACTITIONER

## 2019-04-22 PROCEDURE — 99999 PR PBB SHADOW E&M-EST. PATIENT-LVL V: ICD-10-PCS | Mod: PBBFAC,,, | Performed by: NURSE PRACTITIONER

## 2019-04-22 PROCEDURE — 99214 OFFICE O/P EST MOD 30 MIN: CPT | Mod: S$PBB,,, | Performed by: NURSE PRACTITIONER

## 2019-04-22 RX ORDER — OXYBUTYNIN CHLORIDE 10 MG/1
10 TABLET, EXTENDED RELEASE ORAL DAILY
Qty: 30 TABLET | Refills: 12 | Status: SHIPPED | OUTPATIENT
Start: 2019-04-22 | End: 2020-10-20

## 2019-04-22 RX ORDER — TAMSULOSIN HYDROCHLORIDE 0.4 MG/1
0.8 CAPSULE ORAL DAILY
Qty: 60 CAPSULE | Refills: 11 | Status: SHIPPED | OUTPATIENT
Start: 2019-04-22 | End: 2020-02-11 | Stop reason: SDUPTHER

## 2019-04-22 NOTE — PROGRESS NOTES
[facility] Note  Staff: RITU Landa    PCP: Eusebio Jones    Chief Complaint: Urinary frequency and weak urine stream    Subjective:        HPI: Gera Doran is a 59 y.o. male presents with c/o urinary frequency and weak urine stream.    HISTORY OF PRESENT ILLNESS:  This 59-year-old male returns for routine recheck. He has a history of BPH for which takes Flomax 0.4 mg p.o. daily with which overall he has a satisfactory stream, but his symptoms are mostly related to his history of interstitial cystitis.  He did undergo cystoscopy, bladder   hydrodistention and intravesical Botox injection on 03/12/2018, and since his   last visit he has brought in today a voiding diary.  Says he does have   significant problems with nocturia, which is his main complaint, and the voiding   diary reveals frequent episodes of voiding at night with a maximum volume being   100 mL and most of the volumes were in the 50 mL range.     His last PSA was 3.9 on 01/30/2018.      Current  Meds:    Flomax 0.4 mg one tablet daily.  **The pt is no longer taking Elmiron 100 mg p.o. Tid or Myrbetriq 50 mg at bedtime due to no improvement in his symptoms.    REVIEW OF SYSTEMS:  A comprehensive 10 system review was performed and is negative except as noted above in HPI    PMHx:  Past Medical History:   Diagnosis Date    Arthritis     Asthma     Chemical exposure    Chemical exposure     TO LUNG    GERD (gastroesophageal reflux disease)     IC (interstitial cystitis)     Leukemia     RLS (restless legs syndrome)     Sleep apnea     NO MACHINE     PSHx:  Past Surgical History:   Procedure Laterality Date    APPENDECTOMY      CYSTOSCOPY      CYSTOSCOPY WITH HYDRODISTENSION N/A 3/12/2018    [facility]    CYSTOSCOPY WITH HYDRODISTENSION N/A 1/23/2017    [facility]    CYSTOSCOPY WITH HYDRODISTENSION N/A 2/10/2014    [facility]  [facility]    ESOPHAGOGASTRODUODENOSCOPY      HERNIA REPAIR      INJECTION-BOTOX N/A 3/12/2018    [facility]    SINUS SURGERY      stomach surgery for GERD      TONSILLECTOMY      tonsils       Allergies:  Imitrex [sumatriptan succinate]; Sulfa (sulfonamide antibiotics); and Amoxicillin    Medications: reviewed   Objective:     Vitals:    04/22/19 1441   BP: 126/79   Pulse: 68   Resp: 18   Temp: 98.4 °F (36.9 °C)     General:WDWN in NAD  Eyes: PERRLA, normal conjunctiva  Respiratory: no increased work on breathing, clear to auscultation  Cardiovascular: regular rate and rhythm. No obvious extremity edema.  GI: palpation of masses. No tenderness. No hepatosplenomegaly to palpation.  Musculoskeletal: normal range of motion of bilateral upper extremities. Normal muscle strength and tone.  Skin: no obvious rashes or lesions. No tightening of skin noted.  Neurologic: CN grossly normal. Normal sensation.   Psychiatric: awake, alert and oriented x 3. Mood and affect normal. Cooperative.    PVR by bladder scan:  51 mL  LABS REVIEW:  UA today:  Color:Clear, Yellow  Spec. Grav.  1.020  PH  5.5  Trace of Blood  Cr:   Lab Results   Component Value Date    CREATININE 1.1 04/05/2019     Assessment:       1. Urinary frequency    2. Weak urine stream    3. Benign prostatic hyperplasia without lower urinary tract symptoms    4. Complex renal cyst          Plan:   BPH, OAB, IC:  1.  Draw PSA level, BMP today.  2.  Flomax 0.8 mg daily for LUTS.  3.  Oxybutynin XL 10 mg daily in the am for his daytime frequency.  4.  Renal US to review complex renal cyst    F/u TBD--I offered pt an appt with Dr. De La Cruz in 1-2 months, but pt stated today he would call back because he is going to [place] for 1-2 months.    [facility]: Active    RITU Leonard

## 2019-04-23 ENCOUNTER — TELEPHONE (OUTPATIENT)
Dept: UROLOGY | Facility: CLINIC | Age: 59
End: 2019-04-23

## 2019-04-23 NOTE — TELEPHONE ENCOUNTER
----- Message from Damian De La Cruz MD sent at 4/22/2019  8:05 PM CDT -----  PSA - 3.1    Rec: Keep appt

## 2019-04-25 ENCOUNTER — HOSPITAL ENCOUNTER (OUTPATIENT)
Dept: RADIOLOGY | Facility: HOSPITAL | Age: 59
Discharge: HOME | End: 2019-04-25
Attending: NURSE PRACTITIONER
Payer: MEDICARE

## 2019-04-25 DIAGNOSIS — R35.0 URINARY FREQUENCY: ICD-10-CM

## 2019-04-25 DIAGNOSIS — R39.12 WEAK URINE STREAM: ICD-10-CM

## 2019-04-25 DIAGNOSIS — N28.1 COMPLEX RENAL CYST: ICD-10-CM

## 2019-04-25 DIAGNOSIS — N40.0 BENIGN PROSTATIC HYPERPLASIA WITHOUT LOWER URINARY TRACT SYMPTOMS: ICD-10-CM

## 2019-04-25 DIAGNOSIS — S76.301D HAMSTRING INJURY, RIGHT, SUBSEQUENT ENCOUNTER: Primary | ICD-10-CM

## 2019-04-25 DIAGNOSIS — M47.816 LUMBAR SPONDYLOSIS: ICD-10-CM

## 2019-04-25 PROCEDURE — 76770 US RETROPERITONEAL COMPLETE: ICD-10-PCS | Mod: 26,,, | Performed by: RADIOLOGY

## 2019-04-25 PROCEDURE — 76770 US EXAM ABDO BACK WALL COMP: CPT | Mod: 26,,, | Performed by: RADIOLOGY

## 2019-04-25 PROCEDURE — 76770 US EXAM ABDO BACK WALL COMP: CPT | Mod: TC

## 2019-04-29 ENCOUNTER — HOSPITAL ENCOUNTER (OUTPATIENT)
Dept: RADIOLOGY | Facility: HOSPITAL | Age: 59
Discharge: HOME | End: 2019-04-29
Attending: STUDENT IN AN ORGANIZED HEALTH CARE EDUCATION/TRAINING PROGRAM
Payer: MEDICARE

## 2019-04-29 DIAGNOSIS — S76.301D HAMSTRING INJURY, RIGHT, SUBSEQUENT ENCOUNTER: ICD-10-CM

## 2019-04-29 DIAGNOSIS — M47.816 LUMBAR SPONDYLOSIS: ICD-10-CM

## 2019-04-29 PROCEDURE — 73721 MRI JNT OF LWR EXTRE W/O DYE: CPT | Mod: 26,RT,, | Performed by: RADIOLOGY

## 2019-04-29 PROCEDURE — 72148 MRI LUMBAR SPINE W/O DYE: CPT | Mod: 26,,, | Performed by: RADIOLOGY

## 2019-04-29 PROCEDURE — 73721 MRI JNT OF LWR EXTRE W/O DYE: CPT | Mod: TC,RT

## 2019-04-29 PROCEDURE — 72148 MRI LUMBAR SPINE WITHOUT CONTRAST: ICD-10-PCS | Mod: 26,,, | Performed by: RADIOLOGY

## 2019-04-29 PROCEDURE — 72148 MRI LUMBAR SPINE W/O DYE: CPT | Mod: TC

## 2019-04-29 PROCEDURE — 73721 MRI HIP WITHOUT CONTRAST RIGHT: ICD-10-PCS | Mod: 26,RT,, | Performed by: RADIOLOGY

## 2019-04-30 DIAGNOSIS — M25.551 RIGHT HIP PAIN: Primary | ICD-10-CM

## 2019-05-02 ENCOUNTER — OFFICE VISIT (OUTPATIENT)
Dept: ORTHOPEDICS | Facility: CLINIC | Age: 59
End: 2019-05-02
Payer: MEDICARE

## 2019-05-02 ENCOUNTER — HOSPITAL ENCOUNTER (OUTPATIENT)
Dept: RADIOLOGY | Facility: HOSPITAL | Age: 59
Discharge: HOME | End: 2019-05-02
Attending: ORTHOPAEDIC SURGERY
Payer: MEDICARE

## 2019-05-02 VITALS
BODY MASS INDEX: 27.02 KG/M2 | DIASTOLIC BLOOD PRESSURE: 75 MMHG | HEIGHT: 71 IN | WEIGHT: 193 LBS | HEART RATE: 61 BPM | SYSTOLIC BLOOD PRESSURE: 135 MMHG

## 2019-05-02 DIAGNOSIS — S76.311A RIGHT HAMSTRING STRAIN, INITIAL ENCOUNTER: Primary | ICD-10-CM

## 2019-05-02 DIAGNOSIS — M89.8X5 PAIN IN RIGHT FEMUR: Primary | ICD-10-CM

## 2019-05-02 DIAGNOSIS — M25.551 RIGHT HIP PAIN: ICD-10-CM

## 2019-05-02 PROCEDURE — 99213 OFFICE O/P EST LOW 20 MIN: CPT | Mod: PBBFAC,25,PN | Performed by: ORTHOPAEDIC SURGERY

## 2019-05-02 PROCEDURE — 99999 PR PBB SHADOW E&M-EST. PATIENT-LVL III: CPT | Mod: PBBFAC,,, | Performed by: ORTHOPAEDIC SURGERY

## 2019-05-02 PROCEDURE — 99999 PR PBB SHADOW E&M-EST. PATIENT-LVL III: ICD-10-PCS | Mod: PBBFAC,,, | Performed by: ORTHOPAEDIC SURGERY

## 2019-05-02 PROCEDURE — 73502 X-RAY EXAM HIP UNI 2-3 VIEWS: CPT | Mod: 26,RT,, | Performed by: RADIOLOGY

## 2019-05-02 PROCEDURE — 73502 XR HIP 2 VIEW RIGHT: ICD-10-PCS | Mod: 26,RT,, | Performed by: RADIOLOGY

## 2019-05-02 PROCEDURE — 73502 X-RAY EXAM HIP UNI 2-3 VIEWS: CPT | Mod: TC,PN,RT

## 2019-05-02 PROCEDURE — 99203 PR OFFICE/OUTPT VISIT, NEW, LEVL III, 30-44 MIN: ICD-10-PCS | Mod: S$PBB,,, | Performed by: ORTHOPAEDIC SURGERY

## 2019-05-02 PROCEDURE — 99203 OFFICE O/P NEW LOW 30 MIN: CPT | Mod: S$PBB,,, | Performed by: ORTHOPAEDIC SURGERY

## 2019-05-02 NOTE — LETTER
May 3, 2019      John Giles MD  [address] [facility] [address]  [facility] [address]           [address] [address]  Phone: [phone]          Patient: Gera Doran   MR Number: [id number]   YOB: 1960   Date of Visit: 5/2/2019       Dear Dr. John Giles:    Thank you for referring Gera Doran to me for evaluation. Attached you will find relevant portions of my assessment and plan of care.    If you have questions, please do not hesitate to call me. I look forward to following Gera Doran along with you.    Sincerely,    Prabhu Hernandez MD    Enclosure  CC:  No Recipients    If you would like to receive this communication electronically, please contact [email or web address] or [phone] to request more information on [employer] access.    For providers and/or their staff who would like to refer a patient to [facility], please contact us through our one-stop-shop provider referral line, Adis Murillo, at [phone].    If you feel you have received this communication in error or would no longer like to receive these types of communications, please e-mail [email or web address]

## 2019-05-03 NOTE — PROGRESS NOTES
Past Medical History:   Diagnosis Date    Arthritis     Asthma     Chemical exposure    Chemical exposure     TO LUNG    GERD (gastroesophageal reflux disease)     IC (interstitial cystitis)     Leukemia     RLS (restless legs syndrome)     Sleep apnea     NO MACHINE       Past Surgical History:   Procedure Laterality Date    APPENDECTOMY      CYSTOSCOPY      CYSTOSCOPY WITH HYDRODISTENSION N/A 3/12/2018    [facility]    CYSTOSCOPY WITH HYDRODISTENSION N/A 1/23/2017    [facility]    CYSTOSCOPY WITH HYDRODISTENSION N/A 2/10/2014    [facility] OR    ESOPHAGOGASTRODUODENOSCOPY      HERNIA REPAIR      INJECTION-BOTOX N/A 3/12/2018    [facility] OR    SINUS SURGERY      stomach surgery for GERD      TONSILLECTOMY      tonsils         Current Outpatient Medications   Medication Sig    albuterol 90 mcg/actuation inhaler 2 puffs every 4 hours as needed for cough, wheeze, or shortness of breath    catheter accessories, external Misc 1 Device by Misc.(Non-Drug; Combo Route) route 2 (two) times daily as needed.    fluticasone (FLONASE) 50 mcg/actuation nasal spray 2 sprays by Each Nare route as needed. (Patient taking differently: 2 sprays by Each Nare route as needed for Allergies. )    fluticasone-vilanterol (BREO ELLIPTA) 200-25 mcg/dose DsDv diskus inhaler Inhale 1 puff into the lungs once daily. Controller    montelukast (SINGULAIR) 10 mg tablet Take 1 tablet (10 mg total) by mouth every evening.    oxybutynin (DITROPAN-XL) 10 MG 24 hr tablet Take 1 tablet (10 mg total) by mouth once daily.    pantoprazole (PROTONIX) 40 MG tablet Take 40 mg by mouth once daily.     tamsulosin (FLOMAX) 0.4 mg Cap Take 2 capsules (0.8 mg total) by mouth once daily.    albuterol-ipratropium 2.5mg-0.5mg/3mL (DUO-NEB) 0.5 mg-3 mg(2.5 mg base)/3 mL nebulizer solution Take 3 mLs by  nebulization every 6 (six) hours as needed. (Patient taking differently: Take 3 mLs by nebulization every 6 (six) hours as needed for Wheezing or Shortness of Breath. )    gabapentin (NEURONTIN) 300 MG capsule Take 1 capsule (300 mg total) by mouth every 8 (eight) hours as needed.    LORazepam (ATIVAN) 1 MG tablet Take 1 tablet (1 mg total) by mouth every 12 (twelve) hours as needed for Anxiety.    sertraline (ZOLOFT) 100 MG tablet Take 50 mg by mouth every evening. 1/2 tablet daily     No current facility-administered medications for this visit.        Review of patient's allergies indicates:   Allergen Reactions    Imitrex [sumatriptan succinate] Itching    Sulfa (sulfonamide antibiotics) Itching    Amoxicillin Itching       History reviewed. No pertinent family history.    Social History     Socioeconomic History    Marital status:      Spouse name: Not on file    Number of children: Not on file    Years of education: Not on file    Highest education level: Not on file   Occupational History    Not on file   Social Needs    Financial resource strain: Not on file    Food insecurity:     Worry: Not on file     Inability: Not on file    Transportation needs:     Medical: Not on file     Non-medical: Not on file   Tobacco Use    Smoking status: Never Smoker    Smokeless tobacco: Current User     Types: Chew   Substance and Sexual Activity    Alcohol use: Yes     Comment: OCC    Drug use: No    Sexual activity: Not on file   Lifestyle    Physical activity:     Days per week: Not on file     Minutes per session: Not on file    Stress: Not on file   Relationships    Social connections:     Talks on phone: Not on file     Gets together: Not on file     Attends Zoroastrianism service: Not on file     Active member of club or organization: Not on file     Attends meetings of clubs or organizations: Not on file     Relationship status: Not on file   Other Topics Concern    Not on file   Social  History Narrative    Not on file       Chief Complaint:   Chief Complaint   Patient presents with    Hip Pain     right hip pain       History of present illness:  This is a 59-year-old male with a complicated history of right posterior thigh and buttock pain.  Patient has been evaluated by Neurosurgery as well as pain management.  He has seen physicians both here at [facility] as well as at [facility] and [facility].  Patient has MRIs both of his lumbar spine in his right hip.  Patient states that it started over a decade ago after driving to [place].  Now has pain in the posterior buttocks and thigh.  Does go down to his knee at times.  He has had cortisone injections in his head hamstring muscle previously without great success.  He has tried epidural steroid injections without great success.  His most substantial relief came after having a urinary catheter and as well as being on Flomax and treating urologic issues.  Pain returned after catheter came out.  Pain is a 7/10.  Symptoms are worsening and severe.  Has never had an MRI of his right thigh or hamstring muscle itself.      Review of Systems:    Constitution: Negative for chills, fever, and sweats.  Negative for unexplained weight loss.    HENT:  Negative for headaches and positive for blurry vision.    Cardiovascular:Negative for chest pain or irregular heart beat. Negative for hypertension.    Respiratory:  Negative for cough and shortness of breath.    Gastrointestinal: Negative for abdominal pain, heartburn, melena, nausea, and vomitting.    Genitourinary:  Positive for urologic issues.    Musculoskeletal:  See HPI    Neurological: Negative for numbness.    Psychiatric/Behavioral: Negative for depression.  The patient is not nervous/anxious.      Endocrine: Negative for polyuria    Hematologic/Lymphatic: Negative for bleeding problem.  Does not bruise/bleed easily.    Skin: Negative for poor would healing and rash      Physical  Examination:    Vital Signs:    Vitals:    05/02/19 1437   BP: 135/75   Pulse: 61       Body mass index is 26.92 kg/m².    This a well-developed, well nourished patient in no acute distress.  They are alert and oriented and cooperative to examination.  Pt. walks without an antalgic gait.      Examination of the patient's bilateral hips shows full range of motion with flexion to 160°, extension to 0, external rotation to 50°, internal rotation of 15°, abduction of 50°, adduction of 15°. Skin has no rashes or bruising. Patient has negative Stinchfield exam. Patient has negative straight leg raise.Negative internal impingement test. Negative CHAU test. Negative Tg's test. Patient has no pain with hip range of motion. Nontender to palpation over the greater trochanteric bursa. Patient is 5 out of 5 motor strength, palpable distal pulses, and intact light touch sensation.       X-rays:  X-ray of his right hip is ordered and reviewed which shows no abnormalities.    MRI of the right hip:There is no abnormal marrow signal suggesting acute fracture or osseous destruction.  There is no abnormal joint effusion. No tendon or muscular tear seen.. Visualized pelvic structures suggest diffuse bladder wall thickening although extension of by the incomplete distention and recommend correlation clinically if there is clinical consideration for cystitis     Assessment::  Possible right hamstring muscular injury    Plan:  I recommended an MRI of his right thigh and hamstring itself.  I also recommended getting back in with Dr. De La Cruz to evaluate his urologic condition.  Follow-up after the MRI is completed.    This note was created using [employer]. voice recognition software that occasionally misinterpreted phrases or words.    Consult note is delivered via Epic messaging service.

## 2019-05-07 ENCOUNTER — HOSPITAL ENCOUNTER (OUTPATIENT)
Dept: RADIOLOGY | Facility: HOSPITAL | Age: 59
Discharge: HOME | End: 2019-05-07
Attending: ORTHOPAEDIC SURGERY
Payer: MEDICARE

## 2019-05-07 DIAGNOSIS — M89.8X5 PAIN IN RIGHT FEMUR: ICD-10-CM

## 2019-05-07 PROCEDURE — 73718 MRI FEMUR WITHOUT CONTRAST RIGHT: ICD-10-PCS | Mod: 26,RT,, | Performed by: RADIOLOGY

## 2019-05-07 PROCEDURE — 73718 MRI LOWER EXTREMITY W/O DYE: CPT | Mod: 26,RT,, | Performed by: RADIOLOGY

## 2019-05-07 PROCEDURE — 73718 MRI LOWER EXTREMITY W/O DYE: CPT | Mod: TC,RT

## 2019-05-07 NOTE — PROGRESS NOTES
Results noted. No findings in the muscle or thigh. Would get back in with Dr De La Cruz to discuss further urologic treatment

## 2019-08-20 DIAGNOSIS — R09.89 CHRONIC SINUS COMPLAINTS: ICD-10-CM

## 2019-08-20 DIAGNOSIS — J68.3: ICD-10-CM

## 2019-08-20 DIAGNOSIS — J82.83 EOSINOPHILIC ASTHMA: ICD-10-CM

## 2019-08-20 RX ORDER — MONTELUKAST SODIUM 10 MG/1
10 TABLET ORAL NIGHTLY
Qty: 90 TABLET | Refills: 3 | Status: SHIPPED | OUTPATIENT
Start: 2019-08-20 | End: 2020-02-11 | Stop reason: SDUPTHER

## 2019-10-03 ENCOUNTER — PATIENT MESSAGE (OUTPATIENT)
Dept: HEMATOLOGY/ONCOLOGY | Facility: CLINIC | Age: 59
End: 2019-10-03

## 2019-10-29 ENCOUNTER — LAB VISIT (OUTPATIENT)
Dept: LAB | Facility: HOSPITAL | Age: 59
End: 2019-10-29
Attending: INTERNAL MEDICINE
Payer: MEDICARE

## 2019-10-29 DIAGNOSIS — C91.10 CLL (CHRONIC LYMPHOCYTIC LEUKEMIA): ICD-10-CM

## 2019-10-29 LAB
ALBUMIN SERPL BCP-MCNC: 3.8 G/DL (ref 3.5–5.2)
ALP SERPL-CCNC: 74 U/L (ref 55–135)
ALT SERPL W/O P-5'-P-CCNC: 37 U/L (ref 10–44)
ANION GAP SERPL CALC-SCNC: 12 MMOL/L (ref 8–16)
AST SERPL-CCNC: 23 U/L (ref 10–40)
BASOPHILS NFR BLD: 0 % (ref 0–1.9)
BILIRUB SERPL-MCNC: 0.8 MG/DL (ref 0.1–1)
BUN SERPL-MCNC: 20 MG/DL (ref 6–20)
CALCIUM SERPL-MCNC: 8.7 MG/DL (ref 8.7–10.5)
CHLORIDE SERPL-SCNC: 103 MMOL/L (ref 95–110)
CO2 SERPL-SCNC: 27 MMOL/L (ref 23–29)
CREAT SERPL-MCNC: 1.2 MG/DL (ref 0.5–1.4)
DIFFERENTIAL METHOD: ABNORMAL
EOSINOPHIL NFR BLD: 0 % (ref 0–8)
ERYTHROCYTE [DISTWIDTH] IN BLOOD BY AUTOMATED COUNT: 13.4 % (ref 11.5–14.5)
EST. GFR  (AFRICAN AMERICAN): >60 ML/MIN/1.73 M^2
EST. GFR  (NON AFRICAN AMERICAN): >60 ML/MIN/1.73 M^2
GLUCOSE SERPL-MCNC: 86 MG/DL (ref 70–110)
HCT VFR BLD AUTO: 43.2 % (ref 40–54)
HGB BLD-MCNC: 13.7 G/DL (ref 14–18)
IMM GRANULOCYTES # BLD AUTO: ABNORMAL K/UL (ref 0–0.04)
IMM GRANULOCYTES NFR BLD AUTO: ABNORMAL % (ref 0–0.5)
LYMPHOCYTES NFR BLD: 0 % (ref 18–48)
MCH RBC QN AUTO: 31.4 PG (ref 27–31)
MCHC RBC AUTO-ENTMCNC: 31.7 G/DL (ref 32–36)
MCV RBC AUTO: 99 FL (ref 82–98)
MONOCYTES NFR BLD: 4 % (ref 4–15)
NEUTROPHILS NFR BLD: 4 % (ref 38–73)
NEUTS BAND NFR BLD MANUAL: 92 %
NRBC BLD-RTO: 0 /100 WBC
PLATELET # BLD AUTO: 149 K/UL (ref 150–350)
PMV BLD AUTO: 9.1 FL (ref 9.2–12.9)
POTASSIUM SERPL-SCNC: 4 MMOL/L (ref 3.5–5.1)
PROT SERPL-MCNC: 6 G/DL (ref 6–8.4)
RBC # BLD AUTO: 4.36 M/UL (ref 4.6–6.2)
SODIUM SERPL-SCNC: 142 MMOL/L (ref 136–145)
WBC # BLD AUTO: 46.49 K/UL (ref 3.9–12.7)

## 2019-10-29 PROCEDURE — 85060 BLOOD SMEAR INTERPRETATION: CPT | Mod: ,,, | Performed by: PATHOLOGY

## 2019-10-29 PROCEDURE — 85007 BL SMEAR W/DIFF WBC COUNT: CPT

## 2019-10-29 PROCEDURE — 36415 COLL VENOUS BLD VENIPUNCTURE: CPT

## 2019-10-29 PROCEDURE — 85060 PATHOLOGIST REVIEW: ICD-10-PCS | Mod: ,,, | Performed by: PATHOLOGY

## 2019-10-29 PROCEDURE — 85027 COMPLETE CBC AUTOMATED: CPT

## 2019-10-29 PROCEDURE — 80053 COMPREHEN METABOLIC PANEL: CPT

## 2019-10-30 ENCOUNTER — OFFICE VISIT (OUTPATIENT)
Dept: HEMATOLOGY/ONCOLOGY | Facility: CLINIC | Age: 59
End: 2019-10-30
Payer: MEDICARE

## 2019-10-30 VITALS
HEIGHT: 71 IN | SYSTOLIC BLOOD PRESSURE: 99 MMHG | BODY MASS INDEX: 26.74 KG/M2 | TEMPERATURE: 98 F | OXYGEN SATURATION: 97 % | DIASTOLIC BLOOD PRESSURE: 65 MMHG | WEIGHT: 191 LBS | RESPIRATION RATE: 16 BRPM | HEART RATE: 58 BPM

## 2019-10-30 DIAGNOSIS — C91.10 CLL (CHRONIC LYMPHOCYTIC LEUKEMIA): Primary | ICD-10-CM

## 2019-10-30 LAB
PATH REV BLD -IMP: NORMAL
PATH REV BLD -IMP: NORMAL

## 2019-10-30 PROCEDURE — 99214 OFFICE O/P EST MOD 30 MIN: CPT | Mod: S$PBB,,, | Performed by: INTERNAL MEDICINE

## 2019-10-30 PROCEDURE — 99213 OFFICE O/P EST LOW 20 MIN: CPT | Mod: PBBFAC | Performed by: INTERNAL MEDICINE

## 2019-10-30 PROCEDURE — 99214 PR OFFICE/OUTPT VISIT, EST, LEVL IV, 30-39 MIN: ICD-10-PCS | Mod: S$PBB,,, | Performed by: INTERNAL MEDICINE

## 2019-10-30 PROCEDURE — 99999 PR PBB SHADOW E&M-EST. PATIENT-LVL III: CPT | Mod: PBBFAC,,, | Performed by: INTERNAL MEDICINE

## 2019-10-30 PROCEDURE — 99999 PR PBB SHADOW E&M-EST. PATIENT-LVL III: ICD-10-PCS | Mod: PBBFAC,,, | Performed by: INTERNAL MEDICINE

## 2019-10-30 RX ORDER — HYDROCHLOROTHIAZIDE 25 MG/1
25 TABLET ORAL DAILY
COMMUNITY
End: 2019-11-19 | Stop reason: ALTCHOICE

## 2019-10-30 RX ORDER — CLOTRIMAZOLE AND BETAMETHASONE DIPROPIONATE 10; .64 MG/G; MG/G
CREAM TOPICAL
COMMUNITY
Start: 2019-08-22 | End: 2019-10-30

## 2019-10-30 NOTE — PROGRESS NOTES
CCI am still in pain    Gera Doran is a 59 y.o.    Visit 59-year-old gentleman with chronic lymphocytic leukemia on observation alone    He is not having any drenching night sweats no fever and no weight loss.  His anxiety is controlled with  benzodiazepines  He is tolerating Flomax for BPH in his symptoms are improving as well.  Abnormal CT scan in past  Revealing bilateral cervical adenopathy with superior mediastinal lymphadenopathy with maximum dimension of the abnormal lymph node at 11 mm as well as renal cysts.  Pt with chronic 4/10 pain from hip and back which is chronic and he sees another physician for such.  Is here to check his labs for her CLL stability  He sees Urology regularly for symptoms related to BPH    Past Medical History:   Diagnosis Date    Arthritis     Asthma     Chemical exposure    Chemical exposure     TO LUNG    GERD (gastroesophageal reflux disease)     IC (interstitial cystitis)     Leukemia     RLS (restless legs syndrome)     Sleep apnea     NO MACHINE         Current Outpatient Medications:     albuterol 90 mcg/actuation inhaler, 2 puffs every 4 hours as needed for cough, wheeze, or shortness of breath, Disp: 3 Inhaler, Rfl: 3    albuterol-ipratropium 2.5mg-0.5mg/3mL (DUO-NEB) 0.5 mg-3 mg(2.5 mg base)/3 mL nebulizer solution, Take 3 mLs by nebulization every 6 (six) hours as needed. (Patient taking differently: Take 3 mLs by nebulization every 6 (six) hours as needed for Wheezing or Shortness of Breath. ), Disp: 120 vial, Rfl: 11    catheter accessories, external Misc, 1 Device by Misc.(Non-Drug; Combo Route) route 2 (two) times daily as needed., Disp: 5 each, Rfl: 2    fluticasone (FLONASE) 50 mcg/actuation nasal spray, 2 sprays by Each Nare route as needed. (Patient taking differently: 2 sprays by Each Nare route as needed for Allergies. ), Disp: 1 Bottle, Rfl: 11    fluticasone-vilanterol (BREO ELLIPTA) 200-25 mcg/dose DsDv diskus inhaler, Inhale 1 puff into  "the lungs once daily. Controller, Disp: 3 each, Rfl: 3    gabapentin (NEURONTIN) 300 MG capsule, Take 1 capsule (300 mg total) by mouth every 8 (eight) hours as needed., Disp: 90 capsule, Rfl: 11    LORazepam (ATIVAN) 1 MG tablet, Take 1 tablet (1 mg total) by mouth every 12 (twelve) hours as needed for Anxiety., Disp: 90 tablet, Rfl: 0    montelukast (SINGULAIR) 10 mg tablet, Take 1 tablet (10 mg total) by mouth every evening., Disp: 90 tablet, Rfl: 3    oxybutynin (DITROPAN-XL) 10 MG 24 hr tablet, Take 1 tablet (10 mg total) by mouth once daily., Disp: 30 tablet, Rfl: 12    pantoprazole (PROTONIX) 40 MG tablet, Take 40 mg by mouth once daily. , Disp: , Rfl:     sertraline (ZOLOFT) 100 MG tablet, Take 50 mg by mouth every evening. 1/2 tablet daily, Disp: , Rfl:     tamsulosin (FLOMAX) 0.4 mg Cap, Take 2 capsules (0.8 mg total) by mouth once daily., Disp: 60 capsule, Rfl: 11    CONSTITUTIONAL: No fevers, chills,  wt. loss  SKIN: no rashes or itching  ENT:  +  Headaches,no  head trauma, vision changes, or eye pain  LYMPH NODES: palpable cervical nodes   CV: No chest pain, palpitations.   RESP: No dyspnea on exertion, cough,  hemoptysis  GI: No nausea, emesis, diarrhea, constipation  : BPH and symptoms   HEME: No easy bruising, bleeding problems  PSYCHIATRIC: No depression, No anxiety  nopsychosis, hallucinations.  NEURO: No seizures, memory loss no further  weakness and dizziness  MSK: No arthralgias Positive back pain and he has seen pain specialists for such 4/10   No falls         BP 99/65   Pulse (!) 58   Temp 97.9 °F (36.6 °C) (Oral)   Resp 16   Ht 5' 11" (1.803 m)   Wt 86.6 kg (191 lb)   SpO2 97%   BMI 26.64 kg/m²     Constitutional: oriented to person, place, and time.  well-developed and well-nourished.   HENT:   Head: Normocephalic and atraumatic.   Right Ear: External ear normal.Left Ear: External ear normal.   Nose: Nose normal.   Mouth/Throat: Oropharynx is clear and moist.   Eyes: " Conjunctivae and EOM are normal. Pupils are equal,and reactive to light.   Neck: Normal range of motion. \ No thyromegaly present.   Cardiovascular: Normal rate, regular rhythm, normal heart sounds   No murmur heard.  Pulmonary/Chest: Effort normal and breath sounds normal.  no wheezes.  no rales.   Abdominal: Soft. Bowel sounds are normal.  no mass. There is no tenderness.   Musculoskeletal: Normal range of motion.  Continued edema   Lymphadenopathy:  no cervical adenopathy.   Neurological: alert and oriented to person, place, and time.   Skin: Skin is warm and dry. No rash noted.   Psychiatric: pleasant mood and affect.   behavior is normal.   Vitals reviewed.    Lab Results   Component Value Date    WBC 46.49 (H) 10/29/2019    RBC 4.36 (L) 10/29/2019    HGB 13.7 (L) 10/29/2019    HCT 43.2 10/29/2019    MCV 99 (H) 10/29/2019    MCH 31.4 (H) 10/29/2019    MCHC 31.7 (L) 10/29/2019    RDW 13.4 10/29/2019     (L) 10/29/2019    MPV 9.1 (L) 10/29/2019    GRAN 4.0 (L) 10/29/2019    LYMPH 0.0 (L) 10/29/2019    MONO 4.0 10/29/2019    EOS CANCELED 05/30/2018    BASO CANCELED 05/30/2018    EOSINOPHIL 0.0 10/29/2019    BASOPHIL 0.0 10/29/2019       Ref Range & Units11:25  WBC3.90 - 12.70 K/uL24.80 Abnormally high  RBC4.60 - 6.20 M/uL4.32 Abnormally low  Dfpozauhqg05.0 - 18.0 g/dL13.3 Abnormally low  Ibwtcqubib81.0 - 54.0 %40.7 MCV82 - 98 fL94 MCH27.0 - 31.0 pg30.8 MCHC32.0 - 36.0 g/dL32.7 RDW11.5 - 14.5 %14.0 Nombhunym091 - 350 K/uL160 MPV9.2 - 12.9 fL7.7 Abnormally low  Gran%38.0 - 73.0 %14.0 Abnormally low  Lymph%18.0 - 48.0 %82.0 Abnormally high  Mono%4.0 - 15.0 %3.0 Abnormally low  Eosinophil%0.0 - 8.0 %1.0 Basophil%0.0 - 1.9 %0.0 Platelet EstimateAppears normal HypoOccasional       Pathologist Review Peripheral Smear REVIEWED   Comments: Electronically reviewed and signed by:   Regi Yang M.D.   Signed on 01/23/17 at 13:41   PATHOLOGIST INTERPRETATION   WBC- Leukocytosis with an absolute lymphocytosis.   Lymphocytes are   mature and slightly atypical.  Smudge cells are increased.  The   morphology is suggestive of CLL.  Recommend flow cytometry of   peripheral blood ([id number])for confirmation.   RBC- Mild normocytic anemia without significant morphologic   abnormalities.   PLT- Normal in number and morphology.   Interpreted by Regi Yang M.D.      Resulting Agency [facility]     MRI Abdomen-Pelvis w w/o Contrast (XPD)   Order: [id number]   Status:  Final result   Visible to patient:  Yes (Patient Portal) Next appt:  04/18/2019 at 09:00 AM in Neurosurgery (John Giles MD) Dx:  Renal cyst   Details     Reading Physician Reading Date Result Priority   Hayden Rice MD 4/5/2019 Routine      Narrative     EXAMINATION:  MRI ABDOMEN-PELVIS W W/O CONTRAST (XPD)    CLINICAL HISTORY:  renal right exophytic cysts;  Cyst of kidney, acquired    TECHNIQUE:  Multiplanar, multisequence pre and post-contrast images were obtained through the abdomen and pelvis with the use of 9 cc of intravenous Gadavist.  Patient tolerated procedure without adverse reaction    COMPARISON:  MRI of the abdomen 07/20/2017.  CT abdomen pelvis 02/29/2017.    FINDINGS:  The liver is normal in size and signal intensity.  No focal hepatic lesion.  No perihepatic fluid.  Gallbladder is normally distended.  No intra or extrahepatic biliary ductal dilatation.    The spleen is normal in size and signal intensities.  Small 6 mm cyst of the spleen.  The pancreas is normal in size and signal intensity.  No peripancreatic inflammatory change.  No pancreatic ductal dilatation.  The adrenal glands are unremarkable.    Kidneys are normal in size and signal intensity.  There are bilateral T2 hyperintense cysts the largest on the left measuring 2.7 cm in diameter.  Within the medial midpole of the right kidney there is a chronic heterogeneous T2 hyperintense complicated cyst measuring 3.8 x 2.3 cm slightly increased in size over the interval (previously  measuring 3.2 x 2.1 cm.  This complicated cyst demonstrates mild postcontrast enhancement.  No changes of hydronephrosis.  No perinephric inflammatory change.    Air and stool throughout the loops of colon.  Scattered diverticula within the distal colon.  No MRI evidence to suggest a bowel obstruction.  The bladder is distended.  Prostate is enlarged measuring 3.8 cm AP x 5.2 cm transverse.  Seminal vesicles and rectum unremarkable.    No significant mesenteric or retroperitoneal lymphadenopathy.  No significant ascites.      Impression       1. Small splenic cyst.  2. Chronic bilateral simple renal cysts.  3. Chronic complicated cyst of the right kidney which has slightly increased in size over the interval.  Continued follow-up is recommended.  4. Diverticulosis.  5. Prostatic hypertrophy.      Electronically signed by: Hayden Rice  Date: 04/05/2019             Pt had retroperitoneal ultrasound that showed renal mass vs complex cyst for which pt has follow up with Dr Lovell  Beta 2 normal  And LDH normal      CLL (chronic lymphocytic leukemia)  -     Pathologist Interpretation Differential; Future; Expected date: 10/30/2019  -     CBC auto differential; Future; Expected date: 10/30/2019         To Neurosurgeon to discuss options of treatment for back pain patient still has not seen 1  Pt may continue lorazepam for insomnia and anxiety; he should get these prescriptions from his primary care physician if they deem that he is a candidate for such I will not refill these medications for him  WBC stable , anemia and thrombocytopenia stable  Avoid NSAIDS  as this can worsen thrombocytopenia in cause an increased risk of bleeding  To his urologist for continual observation  RTC 6 to 8 months with labs to watch for progression  He could have his counts watch by his primary care physician if he likes her return to clinic if his doubling time progresses  Reiterated the possible treatments for his cancer event he  develops organ compromise from such  Pt given the diagnosis and staging as a stage 3 CLL due to lymphocytosis and anemia      Thank you for allowing me to evaluate and participate in the care of this pleasant patient. Please fell free to call me with any questions or concerns.    Warmly,  Leilani Simon MD    This note was dictated with Dragon and briefly proofread. Please excuse any sentences that may be unclear or words misspelled

## 2019-10-30 NOTE — LETTER
November 5, 2019      Eusebio Jones MD  [address] [facility] [place] [address]           [facility]  [address] [place] [address]  Phone: [phone]          Patient: Gera Doran   MR Number: [id number]   YOB: 1960   Date of Visit: 10/30/2019       Dear Dr. Eusebio Jones:    Thank you for referring Gera Doran to me for evaluation. Attached you will find relevant portions of my assessment and plan of care.    If you have questions, please do not hesitate to call me. I look forward to following Gera Doran along with you.    Sincerely,    Miles Durant  CC:  No Recipients    If you would like to receive this communication electronically, please contact [email or web address] or [phone] to request more information on [facility] [employer] access.    For providers and/or their staff who would like to refer a patient to [facility], please contact us through our one-stop-shop provider referral line, [facility], at [phone].    If you feel you have received this communication in error or would no longer like to receive these types of communications, please e-mail [email or web address]

## 2019-11-08 DIAGNOSIS — J68.3: ICD-10-CM

## 2019-11-08 DIAGNOSIS — J82.83 EOSINOPHILIC ASTHMA: ICD-10-CM

## 2019-11-08 RX ORDER — PREDNISONE 20 MG/1
TABLET ORAL
Qty: 36 TABLET | Refills: 0 | Status: SHIPPED | OUTPATIENT
Start: 2019-11-08 | End: 2020-02-11 | Stop reason: SDUPTHER

## 2019-11-08 RX ORDER — AZITHROMYCIN 500 MG/1
TABLET, FILM COATED ORAL
Qty: 3 TABLET | Refills: 3 | Status: SHIPPED | OUTPATIENT
Start: 2019-11-08 | End: 2019-11-27

## 2019-11-08 NOTE — TELEPHONE ENCOUNTER
----- Message from Brittany Madrigal sent at 11/8/2019 11:42 AM CST -----  Contact: self  Type:  RX Refill Request    Who Called:  Patient   Refill or New Rx:  refill  RX Name and Strength:  Azithromycin 500 MG, and Prednisone 20 MG  How is the patient currently taking it? (ex. 1XDay):  PRN  Is this a 30 day or 90 day RX:  30  Preferred Pharmacy with phone number:    [facility] - [place], [place] - [address]  [address]  [place] [address]  Phone: [phone] Fax: [phone]  Local or Mail Order:  local  Ordering Provider:  Dr Louis Fung Call Back Number:  [phone] (home)   Additional Information:  pato

## 2019-11-19 ENCOUNTER — OFFICE VISIT (OUTPATIENT)
Dept: UROLOGY | Facility: CLINIC | Age: 59
End: 2019-11-19
Payer: MEDICARE

## 2019-11-19 ENCOUNTER — LAB VISIT (OUTPATIENT)
Dept: LAB | Facility: HOSPITAL | Age: 59
End: 2019-11-19
Attending: INTERNAL MEDICINE
Payer: MEDICARE

## 2019-11-19 VITALS
RESPIRATION RATE: 18 BRPM | BODY MASS INDEX: 27.04 KG/M2 | DIASTOLIC BLOOD PRESSURE: 75 MMHG | WEIGHT: 193.13 LBS | SYSTOLIC BLOOD PRESSURE: 121 MMHG | HEART RATE: 69 BPM | TEMPERATURE: 98 F | HEIGHT: 71 IN

## 2019-11-19 DIAGNOSIS — R31.29 MICROSCOPIC HEMATURIA: Primary | ICD-10-CM

## 2019-11-19 DIAGNOSIS — N40.1 BENIGN PROSTATIC HYPERPLASIA WITH LOWER URINARY TRACT SYMPTOMS, SYMPTOM DETAILS UNSPECIFIED: ICD-10-CM

## 2019-11-19 DIAGNOSIS — N30.10 INTERSTITIAL CYSTITIS: ICD-10-CM

## 2019-11-19 DIAGNOSIS — R35.0 URINARY FREQUENCY: ICD-10-CM

## 2019-11-19 DIAGNOSIS — C91.10 CLL (CHRONIC LYMPHOCYTIC LEUKEMIA): ICD-10-CM

## 2019-11-19 LAB
BASOPHILS NFR BLD: 0 % (ref 0–1.9)
BILIRUB SERPL-MCNC: ABNORMAL MG/DL
BLOOD URINE, POC: ABNORMAL
COLOR, POC UA: YELLOW
DIFFERENTIAL METHOD: ABNORMAL
EOSINOPHIL NFR BLD: 0 % (ref 0–8)
ERYTHROCYTE [DISTWIDTH] IN BLOOD BY AUTOMATED COUNT: 13.6 % (ref 11.5–14.5)
GLUCOSE UR QL STRIP: ABNORMAL
HCT VFR BLD AUTO: 40.1 % (ref 40–54)
HGB BLD-MCNC: 12.9 G/DL (ref 14–18)
IMM GRANULOCYTES # BLD AUTO: ABNORMAL K/UL (ref 0–0.04)
KETONES UR QL STRIP: ABNORMAL
LEUKOCYTE ESTERASE URINE, POC: ABNORMAL
LYMPHOCYTES NFR BLD: 94 % (ref 18–48)
MCH RBC QN AUTO: 31.8 PG (ref 27–31)
MCHC RBC AUTO-ENTMCNC: 32.2 G/DL (ref 32–36)
MCV RBC AUTO: 99 FL (ref 82–98)
MONOCYTES NFR BLD: 1 % (ref 4–15)
NEUTROPHILS NFR BLD: 5 % (ref 38–73)
NITRITE, POC UA: ABNORMAL
NRBC BLD-RTO: 0 /100 WBC
PATH REV BLD -IMP: NORMAL
PH, POC UA: 6
PLATELET # BLD AUTO: 140 K/UL (ref 150–350)
PLATELET BLD QL SMEAR: ABNORMAL
PMV BLD AUTO: 9.2 FL (ref 9.2–12.9)
PROTEIN, POC: ABNORMAL
RBC # BLD AUTO: 4.06 M/UL (ref 4.6–6.2)
SPECIFIC GRAVITY, POC UA: 1.01
UROBILINOGEN, POC UA: 0.2
WBC # BLD AUTO: 63.46 K/UL (ref 3.9–12.7)

## 2019-11-19 PROCEDURE — 99999 PR PBB SHADOW E&M-EST. PATIENT-LVL IV: CPT | Mod: PBBFAC,,, | Performed by: UROLOGY

## 2019-11-19 PROCEDURE — 85060 BLOOD SMEAR INTERPRETATION: CPT | Mod: ,,, | Performed by: PATHOLOGY

## 2019-11-19 PROCEDURE — 99214 OFFICE O/P EST MOD 30 MIN: CPT | Mod: PBBFAC,PN | Performed by: UROLOGY

## 2019-11-19 PROCEDURE — 85027 COMPLETE CBC AUTOMATED: CPT

## 2019-11-19 PROCEDURE — 81000 URINALYSIS NONAUTO W/SCOPE: CPT | Mod: PBBFAC,PN | Performed by: UROLOGY

## 2019-11-19 PROCEDURE — 85060 PATHOLOGIST REVIEW: ICD-10-PCS | Mod: ,,, | Performed by: PATHOLOGY

## 2019-11-19 PROCEDURE — 99214 OFFICE O/P EST MOD 30 MIN: CPT | Mod: 25,S$PBB,, | Performed by: UROLOGY

## 2019-11-19 PROCEDURE — 87086 URINE CULTURE/COLONY COUNT: CPT

## 2019-11-19 PROCEDURE — 36415 COLL VENOUS BLD VENIPUNCTURE: CPT

## 2019-11-19 PROCEDURE — 88112 PR  CYTOPATH, CELL ENHANCE TECH: ICD-10-PCS | Mod: 26,,, | Performed by: PATHOLOGY

## 2019-11-19 PROCEDURE — 81002 URINALYSIS NONAUTO W/O SCOPE: CPT | Mod: PBBFAC,PN | Performed by: UROLOGY

## 2019-11-19 PROCEDURE — 85007 BL SMEAR W/DIFF WBC COUNT: CPT

## 2019-11-19 PROCEDURE — 88112 CYTOPATH CELL ENHANCE TECH: CPT | Mod: 26,,, | Performed by: PATHOLOGY

## 2019-11-19 PROCEDURE — 99999 PR PBB SHADOW E&M-EST. PATIENT-LVL IV: ICD-10-PCS | Mod: PBBFAC,,, | Performed by: UROLOGY

## 2019-11-19 PROCEDURE — 88112 CYTOPATH CELL ENHANCE TECH: CPT | Performed by: PATHOLOGY

## 2019-11-19 PROCEDURE — 99214 PR OFFICE/OUTPT VISIT, EST, LEVL IV, 30-39 MIN: ICD-10-PCS | Mod: 25,S$PBB,, | Performed by: UROLOGY

## 2019-11-19 PROCEDURE — 51798 US URINE CAPACITY MEASURE: CPT | Mod: PBBFAC,PN | Performed by: UROLOGY

## 2019-11-19 RX ORDER — ALFUZOSIN HYDROCHLORIDE 10 MG/1
10 TABLET, EXTENDED RELEASE ORAL
Qty: 30 TABLET | Refills: 11 | Status: SHIPPED | OUTPATIENT
Start: 2019-11-19 | End: 2020-10-20

## 2019-11-19 NOTE — PROGRESS NOTES
OFFICE NOTE  [unfilled]  [id number]  11/19/2019       CHIEF COMPLAINT:   BPH, interstitial cystitis     HISTORY OF PRESENT ILLNESS:   this 59-year-old male returns for recheck.  He has a history of BPH for which he takes Flomax which he now takes b.i.d. but he states that is not very effective.  He also has history interstitial cystitis and underwent cystoscopy bladder hydrodistention and intravesical Botox injection on 03/12/2018.  He had been taking Ditropan XL 10 mg which has not been very effective.  He does have renal cyst which is stable.  He does complain of pain and discomfort along the urinary tract.    Medical history update reveals he underwent dilatation of esophageal stricture 2-3 weeks ago.    Physical exam:  Abdomen - soft benign nontender no masses no hernias no organomegaly                             External genitalia - normal phallus with adequate meatus testes descended and feel normal no scrotal mass                             Rectal - 25-30 g smooth prostate no nodules normal sphincter tone    Bladder scan - 67 mL     PSA  - 3.1 on 04/22/2019     UA - trace of blood pH 6.0     FINAL IMPRESSION:  BPH, interstitial cystitis, microscopic hematuria       RECOMMENDATIONS:   urine for culture and cytology.  Will change from Flomax to Uroxatral 10 mg p.o. Q.d..  Which change from Ditropan to Toviaz 4 mg p.o. Q.d..  Patient will notify us of the response to the treatment change and will contact him with urine test results.

## 2019-11-20 ENCOUNTER — PATIENT MESSAGE (OUTPATIENT)
Dept: HEMATOLOGY/ONCOLOGY | Facility: CLINIC | Age: 59
End: 2019-11-20

## 2019-11-21 ENCOUNTER — TELEPHONE (OUTPATIENT)
Dept: HEMATOLOGY/ONCOLOGY | Facility: CLINIC | Age: 59
End: 2019-11-21

## 2019-11-21 DIAGNOSIS — C91.10 CLL (CHRONIC LYMPHOCYTIC LEUKEMIA): Primary | ICD-10-CM

## 2019-11-21 LAB — BACTERIA UR CULT: NO GROWTH

## 2019-11-21 NOTE — TELEPHONE ENCOUNTER
----- Message from Swetha Collins sent at 11/21/2019  4:11 PM CST -----  Contact: [phone]/ self   Patient called in returning your call. Please advise.

## 2019-11-21 NOTE — PROGRESS NOTES
Rapid doubling time   ? Was having back procedure possibly ? Steroid injections  Needs ct of chest abdomen and pelvis to assess further

## 2019-11-22 ENCOUNTER — HOSPITAL ENCOUNTER (OUTPATIENT)
Dept: RADIOLOGY | Facility: HOSPITAL | Age: 59
Discharge: HOME | End: 2019-11-22
Attending: INTERNAL MEDICINE
Payer: MEDICARE

## 2019-11-22 DIAGNOSIS — C91.10 CLL (CHRONIC LYMPHOCYTIC LEUKEMIA): ICD-10-CM

## 2019-11-22 LAB
FINAL PATHOLOGIC DIAGNOSIS: NORMAL
PATH REV BLD -IMP: NORMAL

## 2019-11-22 PROCEDURE — 74177 CT ABD & PELVIS W/CONTRAST: CPT | Mod: 26,,, | Performed by: RADIOLOGY

## 2019-11-22 PROCEDURE — 25500020 PHARM REV CODE 255: Performed by: INTERNAL MEDICINE

## 2019-11-22 PROCEDURE — 74177 CT CHEST ABDOMEN PELVIS WITH CONTRAST (XPD): ICD-10-PCS | Mod: 26,,, | Performed by: RADIOLOGY

## 2019-11-22 PROCEDURE — 74177 CT ABD & PELVIS W/CONTRAST: CPT | Mod: TC

## 2019-11-22 PROCEDURE — 71260 CT THORAX DX C+: CPT | Mod: 26,,, | Performed by: RADIOLOGY

## 2019-11-22 PROCEDURE — 71260 CT CHEST ABDOMEN PELVIS WITH CONTRAST (XPD): ICD-10-PCS | Mod: 26,,, | Performed by: RADIOLOGY

## 2019-11-22 RX ADMIN — IOHEXOL 15 ML: 300 INJECTION, SOLUTION INTRAVENOUS at 10:11

## 2019-11-22 RX ADMIN — IOHEXOL 15 ML: 300 INJECTION, SOLUTION INTRAVENOUS at 12:11

## 2019-11-22 RX ADMIN — IOHEXOL 100 ML: 350 INJECTION, SOLUTION INTRAVENOUS at 12:11

## 2019-11-27 ENCOUNTER — OFFICE VISIT (OUTPATIENT)
Dept: HEMATOLOGY/ONCOLOGY | Facility: CLINIC | Age: 59
End: 2019-11-27
Payer: MEDICARE

## 2019-11-27 VITALS
BODY MASS INDEX: 26.98 KG/M2 | HEART RATE: 79 BPM | RESPIRATION RATE: 12 BRPM | DIASTOLIC BLOOD PRESSURE: 70 MMHG | OXYGEN SATURATION: 96 % | HEIGHT: 71 IN | TEMPERATURE: 99 F | WEIGHT: 192.69 LBS | SYSTOLIC BLOOD PRESSURE: 122 MMHG

## 2019-11-27 DIAGNOSIS — C91.10 CLL (CHRONIC LYMPHOCYTIC LEUKEMIA): Primary | ICD-10-CM

## 2019-11-27 PROCEDURE — 99999 PR PBB SHADOW E&M-EST. PATIENT-LVL IV: ICD-10-PCS | Mod: PBBFAC,,, | Performed by: INTERNAL MEDICINE

## 2019-11-27 PROCEDURE — 99215 OFFICE O/P EST HI 40 MIN: CPT | Mod: S$PBB,,, | Performed by: INTERNAL MEDICINE

## 2019-11-27 PROCEDURE — 99999 PR PBB SHADOW E&M-EST. PATIENT-LVL IV: CPT | Mod: PBBFAC,,, | Performed by: INTERNAL MEDICINE

## 2019-11-27 PROCEDURE — G0444 DEPRESSION SCREEN ANNUAL: HCPCS | Mod: PBBFAC,PO | Performed by: INTERNAL MEDICINE

## 2019-11-27 PROCEDURE — 99214 OFFICE O/P EST MOD 30 MIN: CPT | Mod: PBBFAC,PO | Performed by: INTERNAL MEDICINE

## 2019-11-27 PROCEDURE — 99215 PR OFFICE/OUTPT VISIT, EST, LEVL V, 40-54 MIN: ICD-10-PCS | Mod: S$PBB,,, | Performed by: INTERNAL MEDICINE
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
## 2019-12-30 DIAGNOSIS — J82.83 EOSINOPHILIC ASTHMA: ICD-10-CM

## 2019-12-30 RX ORDER — ALBUTEROL SULFATE 90 UG/1
AEROSOL, METERED RESPIRATORY (INHALATION)
Qty: 3 INHALER | Refills: 3 | Status: SHIPPED | OUTPATIENT
Start: 2019-12-30 | End: 2020-02-11 | Stop reason: SDUPTHER

## 2019-12-30 NOTE — TELEPHONE ENCOUNTER
Pt did not answer, left message that refill request was placed per MD    ----- Message from Princess WHIT Cary sent at 12/30/2019  4:08 PM CST -----  Contact: Chayito Doran (Spouse  Type:  RX Refill Request    Who Called:  Chayito Doran (Spouse  Refill or New Rx:  Refill  RX Name and Strength:  Proair  Requesting a call back in regards to spouse states patient received Proair from Dr. Myers.Patient also has appt in February to see the provider. Please call to advise the patient. .   Call back

## 2020-01-07 ENCOUNTER — TELEPHONE (OUTPATIENT)
Dept: PULMONOLOGY | Facility: CLINIC | Age: 60
End: 2020-01-07

## 2020-01-07 NOTE — TELEPHONE ENCOUNTER
No answer     ----- Message from Autumn Darling sent at 1/7/2020  4:10 PM CST -----  Contact: Ivory from [facility]  Type: Needs Medical Advice    Who Called: Ivory from [facility]    Best Call Back Number: [phone]  Additional Information:Ivory from [facility] need advise on is it okay to fill genericbrand of albuterol (PROVENTIL/VENTOLIN HFA) 90 mcg/actuation inhaler. Please call Ivory  and advise.

## 2020-01-09 ENCOUNTER — TELEPHONE (OUTPATIENT)
Dept: PULMONOLOGY | Facility: CLINIC | Age: 60
End: 2020-01-09

## 2020-01-09 NOTE — TELEPHONE ENCOUNTER
No answer.     ----- Message from Gayla Rodriguez sent at 1/9/2020  3:32 PM CST -----  Contact: Ivory with [facility]  Type:  Pharmacy Calling to Clarify an RX    Name of Caller:  Ivory   Pharmacy Name:   [facility]  Prescription Name:  albuterol (PROVENTIL/VENTOLIN HFA) 90 mcg/actuation inhaler  What do they need to clarify?:  If generic can be used  Best Call Back Number:    Additional Information:  Calling to verify they can fill the generic of this medication.

## 2020-01-10 ENCOUNTER — TELEPHONE (OUTPATIENT)
Dept: PULMONOLOGY | Facility: CLINIC | Age: 60
End: 2020-01-10

## 2020-01-10 NOTE — TELEPHONE ENCOUNTER
No answer. Called number 3 times, unsuccessful each time.    ----- Message from Cintia Morgan sent at 1/10/2020  1:10 PM CST -----  Contact: [facility]  [facility] calling about Rx that was called in needing to get an okay to fill a generic brand. States that had called yesterday...  [facility] - [place], [place] - [address]  [address]  [place] [address]  Phone: [phone] Fax: [phone]

## 2020-01-13 ENCOUNTER — TELEPHONE (OUTPATIENT)
Dept: PULMONOLOGY | Facility: CLINIC | Age: 60
End: 2020-01-13

## 2020-01-13 NOTE — TELEPHONE ENCOUNTER
Gave verbal to ling per NP approval to fill Proventil as albuterol. No further action needed.     ----- Message from Mainor Marie sent at 1/13/2020 10:06 AM CST -----  Contact: Ling with [facility]  Type:  Pharmacy Calling to Clarify an RX    Name of Caller:  Ling  Pharmacy Name:  [facility]  Prescription Name:  ventolin  What do they need to clarify?:  Need ok to fill generic  Best Call Back Number:  [phone]  Additional Information:  Has called several times last week to fill for pt.

## 2020-02-11 ENCOUNTER — OFFICE VISIT (OUTPATIENT)
Dept: PULMONOLOGY | Facility: CLINIC | Age: 60
End: 2020-02-11
Payer: MEDICARE

## 2020-02-11 VITALS
OXYGEN SATURATION: 99 % | HEIGHT: 71 IN | DIASTOLIC BLOOD PRESSURE: 78 MMHG | WEIGHT: 201.25 LBS | RESPIRATION RATE: 18 BRPM | HEART RATE: 60 BPM | SYSTOLIC BLOOD PRESSURE: 127 MMHG | BODY MASS INDEX: 28.18 KG/M2

## 2020-02-11 DIAGNOSIS — J82.83 EOSINOPHILIC ASTHMA: Primary | ICD-10-CM

## 2020-02-11 DIAGNOSIS — D84.9 IMMUNE DEFICIENCY DISORDER: ICD-10-CM

## 2020-02-11 DIAGNOSIS — N13.8 BPH WITH OBSTRUCTION/LOWER URINARY TRACT SYMPTOMS: ICD-10-CM

## 2020-02-11 DIAGNOSIS — R09.89 CHRONIC SINUS COMPLAINTS: ICD-10-CM

## 2020-02-11 DIAGNOSIS — N40.1 BPH WITH OBSTRUCTION/LOWER URINARY TRACT SYMPTOMS: ICD-10-CM

## 2020-02-11 DIAGNOSIS — J45.50 SEVERE PERSISTENT ASTHMA WITHOUT COMPLICATION: ICD-10-CM

## 2020-02-11 DIAGNOSIS — J68.3: ICD-10-CM

## 2020-02-11 PROCEDURE — 99214 PR OFFICE/OUTPT VISIT, EST, LEVL IV, 30-39 MIN: ICD-10-PCS | Mod: S$PBB,,, | Performed by: INTERNAL MEDICINE

## 2020-02-11 PROCEDURE — 99999 PR PBB SHADOW E&M-EST. PATIENT-LVL III: ICD-10-PCS | Mod: PBBFAC,,, | Performed by: INTERNAL MEDICINE

## 2020-02-11 PROCEDURE — 99999 PR PBB SHADOW E&M-EST. PATIENT-LVL III: CPT | Mod: PBBFAC,,, | Performed by: INTERNAL MEDICINE

## 2020-02-11 PROCEDURE — 99213 OFFICE O/P EST LOW 20 MIN: CPT | Mod: PBBFAC,PO | Performed by: INTERNAL MEDICINE

## 2020-02-11 PROCEDURE — 99214 OFFICE O/P EST MOD 30 MIN: CPT | Mod: S$PBB,,, | Performed by: INTERNAL MEDICINE

## 2020-02-11 RX ORDER — MONTELUKAST SODIUM 10 MG/1
10 TABLET ORAL NIGHTLY
Qty: 90 TABLET | Refills: 3 | Status: SHIPPED | OUTPATIENT
Start: 2020-02-11 | End: 2021-03-31 | Stop reason: SDUPTHER

## 2020-02-11 RX ORDER — TAMSULOSIN HYDROCHLORIDE 0.4 MG/1
0.8 CAPSULE ORAL DAILY
Qty: 60 CAPSULE | Refills: 11 | Status: SHIPPED | OUTPATIENT
Start: 2020-02-11 | End: 2020-05-18 | Stop reason: SDUPTHER

## 2020-02-11 RX ORDER — FLUTICASONE FUROATE AND VILANTEROL 200; 25 UG/1; UG/1
1 POWDER RESPIRATORY (INHALATION) DAILY
Qty: 3 EACH | Refills: 3 | Status: SHIPPED | OUTPATIENT
Start: 2020-02-11 | End: 2020-09-23

## 2020-02-11 RX ORDER — AZITHROMYCIN 500 MG/1
TABLET, FILM COATED ORAL
Qty: 5 TABLET | Refills: 10 | Status: SHIPPED | OUTPATIENT
Start: 2020-02-11 | End: 2021-05-25 | Stop reason: SDUPTHER

## 2020-02-11 RX ORDER — PREDNISONE 20 MG/1
TABLET ORAL
Qty: 36 TABLET | Refills: 1 | Status: SHIPPED | OUTPATIENT
Start: 2020-02-11 | End: 2020-09-23 | Stop reason: SDUPTHER

## 2020-02-11 RX ORDER — IPRATROPIUM BROMIDE AND ALBUTEROL SULFATE 2.5; .5 MG/3ML; MG/3ML
3 SOLUTION RESPIRATORY (INHALATION) EVERY 6 HOURS PRN
Qty: 120 VIAL | Refills: 11 | Status: SHIPPED | OUTPATIENT
Start: 2020-02-11 | End: 2021-06-23 | Stop reason: SDUPTHER

## 2020-02-11 RX ORDER — FLUTICASONE PROPIONATE 50 MCG
2 SPRAY, SUSPENSION (ML) NASAL
Qty: 15.8 ML | Refills: 11 | Status: SHIPPED | OUTPATIENT
Start: 2020-02-11 | End: 2021-05-25 | Stop reason: SDUPTHER

## 2020-02-11 RX ORDER — ALBUTEROL SULFATE 90 UG/1
AEROSOL, METERED RESPIRATORY (INHALATION)
Qty: 18 G | Refills: 11 | Status: SHIPPED | OUTPATIENT
Start: 2020-02-11 | End: 2020-02-28 | Stop reason: CLARIF

## 2020-02-11 NOTE — PATIENT INSTRUCTIONS
You had childhood asthma, and severe irritant exposure (reactive airway diseases syndrome) , and immune deficiency.    immune weakness may be playing role?  Could use azithromycin weekly to try to suppress infection?  May help overall if immune weakness playing role- may not help.  Dose azithromycin daily for 3 days.        Prednisone 20/d for 3 days may be adequate for mild flares, up to full dose if needed.      Asthma looks to be severe.    breo and singulair to prevent, also control sinuses.    Rescue with albuterol     Biologic therapy for asthma may help.
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
## 2020-02-12 ENCOUNTER — TELEPHONE (OUTPATIENT)
Dept: PULMONOLOGY | Facility: CLINIC | Age: 60
End: 2020-02-12

## 2020-02-12 NOTE — TELEPHONE ENCOUNTER
Gave the approval for the pharmacy to give the generic Ventolin.      ----- Message from Lisy Zuniga sent at 2/12/2020  2:55 PM CST -----  Contact: Deyanira Mendez  Type: Needs Medical Advice    Who Called:  Pt  Best Call Back Number: [phone]  Additional Information: Requesting a call back regarding pt inhaler. Pharmacy stated that they need to speak with nurse before dispensing an generic   Please Advise ---Thank you

## 2020-02-28 RX ORDER — ALBUTEROL SULFATE 90 UG/1
2 AEROSOL, METERED RESPIRATORY (INHALATION) EVERY 6 HOURS PRN
Qty: 18 G | Refills: 0 | Status: SHIPPED | OUTPATIENT
Start: 2020-02-28 | End: 2021-05-25 | Stop reason: SDUPTHER

## 2020-03-05 ENCOUNTER — LAB VISIT (OUTPATIENT)
Dept: LAB | Facility: HOSPITAL | Age: 60
End: 2020-03-05
Attending: INTERNAL MEDICINE
Payer: MEDICARE

## 2020-03-05 DIAGNOSIS — C91.10 CLL (CHRONIC LYMPHOCYTIC LEUKEMIA): ICD-10-CM

## 2020-03-05 LAB
BASOPHILS NFR BLD: 0 % (ref 0–1.9)
DIFFERENTIAL METHOD: ABNORMAL
EOSINOPHIL NFR BLD: 0 % (ref 0–8)
ERYTHROCYTE [DISTWIDTH] IN BLOOD BY AUTOMATED COUNT: 13.5 % (ref 11.5–14.5)
HCT VFR BLD AUTO: 43.1 % (ref 40–54)
HGB BLD-MCNC: 13.5 G/DL (ref 14–18)
IMM GRANULOCYTES # BLD AUTO: ABNORMAL K/UL
LYMPHOCYTES NFR BLD: 89 % (ref 18–48)
MCH RBC QN AUTO: 31.9 PG (ref 27–31)
MCHC RBC AUTO-ENTMCNC: 31.3 G/DL (ref 32–36)
MCV RBC AUTO: 102 FL (ref 82–98)
MONOCYTES NFR BLD: 4 % (ref 4–15)
NEUTROPHILS NFR BLD: 7 % (ref 38–73)
NRBC BLD-RTO: 0 /100 WBC
PLATELET # BLD AUTO: 144 K/UL (ref 150–350)
PLATELET BLD QL SMEAR: ABNORMAL
PMV BLD AUTO: 9.6 FL (ref 9.2–12.9)
RBC # BLD AUTO: 4.23 M/UL (ref 4.6–6.2)
WBC # BLD AUTO: 63.53 K/UL (ref 3.9–12.7)

## 2020-03-05 PROCEDURE — 85060 PATHOLOGIST REVIEW: ICD-10-PCS | Mod: ,,, | Performed by: PATHOLOGY

## 2020-03-05 PROCEDURE — 85027 COMPLETE CBC AUTOMATED: CPT

## 2020-03-05 PROCEDURE — 85060 BLOOD SMEAR INTERPRETATION: CPT | Mod: ,,, | Performed by: PATHOLOGY

## 2020-03-05 PROCEDURE — 85007 BL SMEAR W/DIFF WBC COUNT: CPT

## 2020-03-05 PROCEDURE — 36415 COLL VENOUS BLD VENIPUNCTURE: CPT

## 2020-03-06 ENCOUNTER — PATIENT MESSAGE (OUTPATIENT)
Dept: HEMATOLOGY/ONCOLOGY | Facility: CLINIC | Age: 60
End: 2020-03-06

## 2020-03-09 LAB — PATH REV BLD -IMP: NORMAL

## 2020-04-05 ENCOUNTER — PATIENT MESSAGE (OUTPATIENT)
Dept: PULMONOLOGY | Facility: CLINIC | Age: 60
End: 2020-04-05

## 2020-05-17 DIAGNOSIS — N40.1 BPH WITH OBSTRUCTION/LOWER URINARY TRACT SYMPTOMS: ICD-10-CM

## 2020-05-17 DIAGNOSIS — N13.8 BPH WITH OBSTRUCTION/LOWER URINARY TRACT SYMPTOMS: ICD-10-CM

## 2020-05-18 DIAGNOSIS — N40.1 BPH WITH OBSTRUCTION/LOWER URINARY TRACT SYMPTOMS: ICD-10-CM

## 2020-05-18 DIAGNOSIS — N13.8 BPH WITH OBSTRUCTION/LOWER URINARY TRACT SYMPTOMS: ICD-10-CM

## 2020-05-18 RX ORDER — TAMSULOSIN HYDROCHLORIDE 0.4 MG/1
0.8 CAPSULE ORAL DAILY
Qty: 60 CAPSULE | Refills: 11 | Status: SHIPPED | OUTPATIENT
Start: 2020-05-18 | End: 2021-06-23

## 2020-05-18 RX ORDER — TAMSULOSIN HYDROCHLORIDE 0.4 MG/1
CAPSULE ORAL
Qty: 60 CAPSULE | Refills: 0 | OUTPATIENT
Start: 2020-05-18

## 2020-09-23 ENCOUNTER — OFFICE VISIT (OUTPATIENT)
Dept: PULMONOLOGY | Facility: CLINIC | Age: 60
End: 2020-09-23
Payer: MEDICARE

## 2020-09-23 ENCOUNTER — TELEPHONE (OUTPATIENT)
Dept: HEMATOLOGY/ONCOLOGY | Facility: CLINIC | Age: 60
End: 2020-09-23

## 2020-09-23 ENCOUNTER — PATIENT MESSAGE (OUTPATIENT)
Dept: HEMATOLOGY/ONCOLOGY | Facility: CLINIC | Age: 60
End: 2020-09-23

## 2020-09-23 VITALS
OXYGEN SATURATION: 98 % | SYSTOLIC BLOOD PRESSURE: 125 MMHG | BODY MASS INDEX: 27.06 KG/M2 | WEIGHT: 193.31 LBS | HEART RATE: 55 BPM | DIASTOLIC BLOOD PRESSURE: 73 MMHG | HEIGHT: 71 IN

## 2020-09-23 DIAGNOSIS — J82.83 EOSINOPHILIC ASTHMA: Primary | ICD-10-CM

## 2020-09-23 DIAGNOSIS — R16.1 SPLENOMEGALY: ICD-10-CM

## 2020-09-23 DIAGNOSIS — D69.6 THROMBOCYTOPENIA: ICD-10-CM

## 2020-09-23 DIAGNOSIS — J68.3: ICD-10-CM

## 2020-09-23 DIAGNOSIS — C91.10 CLL (CHRONIC LYMPHOCYTIC LEUKEMIA): ICD-10-CM

## 2020-09-23 DIAGNOSIS — C91.10 CLL (CHRONIC LYMPHOCYTIC LEUKEMIA): Primary | ICD-10-CM

## 2020-09-23 DIAGNOSIS — J45.50 SEVERE PERSISTENT ASTHMA WITHOUT COMPLICATION: ICD-10-CM

## 2020-09-23 PROCEDURE — 99999 PR PBB SHADOW E&M-EST. PATIENT-LVL IV: ICD-10-PCS | Mod: PBBFAC,,, | Performed by: INTERNAL MEDICINE

## 2020-09-23 PROCEDURE — 99214 OFFICE O/P EST MOD 30 MIN: CPT | Mod: S$PBB,,, | Performed by: INTERNAL MEDICINE

## 2020-09-23 PROCEDURE — 99214 OFFICE O/P EST MOD 30 MIN: CPT | Mod: PBBFAC,PO | Performed by: INTERNAL MEDICINE

## 2020-09-23 PROCEDURE — 99999 PR PBB SHADOW E&M-EST. PATIENT-LVL IV: CPT | Mod: PBBFAC,,, | Performed by: INTERNAL MEDICINE

## 2020-09-23 PROCEDURE — 99214 PR OFFICE/OUTPT VISIT, EST, LEVL IV, 30-39 MIN: ICD-10-PCS | Mod: S$PBB,,, | Performed by: INTERNAL MEDICINE

## 2020-09-23 RX ORDER — PREDNISONE 20 MG/1
TABLET ORAL
Qty: 36 TABLET | Refills: 1 | Status: SHIPPED | OUTPATIENT
Start: 2020-09-23 | End: 2021-05-25 | Stop reason: SDUPTHER

## 2020-09-23 RX ORDER — SERTRALINE HYDROCHLORIDE 100 MG/1
50 TABLET, FILM COATED ORAL DAILY
COMMUNITY
Start: 2020-03-04 | End: 2021-03-04

## 2020-09-23 RX ORDER — BUDESONIDE AND FORMOTEROL FUMARATE DIHYDRATE 160; 4.5 UG/1; UG/1
2 AEROSOL RESPIRATORY (INHALATION) EVERY 12 HOURS
Qty: 1 INHALER | Refills: 11 | Status: SHIPPED | OUTPATIENT
Start: 2020-09-23 | End: 2021-02-12

## 2020-09-23 NOTE — PATIENT INSTRUCTIONS
Flare up recently would have been avoided if using inhaled steroids.    Cramps from inhaled steroids unusual - absorption should not occur without liver disease to any significant degree.  Would check hep c antibody.    Would try symbicort - use full dose and stop prednisone. Taper down symbicort dose from 2 twice daily to 1 bedtime to control asthma.    Stop breo.  Rest same .    Would need to taper off prednisone slowly (call) if on over a month.
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
## 2020-09-26 ENCOUNTER — PATIENT MESSAGE (OUTPATIENT)
Dept: UROLOGY | Facility: CLINIC | Age: 60
End: 2020-09-26

## 2020-09-29 ENCOUNTER — PATIENT MESSAGE (OUTPATIENT)
Dept: UROLOGY | Facility: CLINIC | Age: 60
End: 2020-09-29

## 2020-10-16 ENCOUNTER — TELEPHONE (OUTPATIENT)
Dept: HEMATOLOGY/ONCOLOGY | Facility: CLINIC | Age: 60
End: 2020-10-16

## 2020-10-19 ENCOUNTER — TELEPHONE (OUTPATIENT)
Dept: HEMATOLOGY/ONCOLOGY | Facility: CLINIC | Age: 60
End: 2020-10-19

## 2020-10-19 NOTE — TELEPHONE ENCOUNTER
Spoke to patient and informed Dr. Simon will be out of office tomorrow.  Patient's appt r/s to 0900 Thursday 10/22.    After handing up phone, this nurse realized Dr. Simon has a meeting from 9-11 AM that morning.  LM encouraging patient call back to r/s to later time/date.

## 2020-10-20 ENCOUNTER — TELEPHONE (OUTPATIENT)
Dept: HEMATOLOGY/ONCOLOGY | Facility: CLINIC | Age: 60
End: 2020-10-20

## 2020-10-20 ENCOUNTER — DOCUMENTATION ONLY (OUTPATIENT)
Dept: HEMATOLOGY/ONCOLOGY | Facility: CLINIC | Age: 60
End: 2020-10-20

## 2020-10-20 ENCOUNTER — OFFICE VISIT (OUTPATIENT)
Dept: UROLOGY | Facility: CLINIC | Age: 60
End: 2020-10-20
Payer: MEDICARE

## 2020-10-20 ENCOUNTER — LAB VISIT (OUTPATIENT)
Dept: LAB | Facility: HOSPITAL | Age: 60
End: 2020-10-20
Attending: INTERNAL MEDICINE
Payer: MEDICARE

## 2020-10-20 VITALS
TEMPERATURE: 98 F | HEIGHT: 71 IN | DIASTOLIC BLOOD PRESSURE: 76 MMHG | WEIGHT: 197.56 LBS | HEART RATE: 62 BPM | RESPIRATION RATE: 18 BRPM | SYSTOLIC BLOOD PRESSURE: 123 MMHG | BODY MASS INDEX: 27.66 KG/M2

## 2020-10-20 DIAGNOSIS — C91.10 CLL (CHRONIC LYMPHOCYTIC LEUKEMIA): ICD-10-CM

## 2020-10-20 DIAGNOSIS — N30.10 INTERSTITIAL CYSTITIS: ICD-10-CM

## 2020-10-20 DIAGNOSIS — N40.1 BENIGN PROSTATIC HYPERPLASIA WITH URINARY FREQUENCY: Primary | ICD-10-CM

## 2020-10-20 DIAGNOSIS — R35.0 BENIGN PROSTATIC HYPERPLASIA WITH URINARY FREQUENCY: Primary | ICD-10-CM

## 2020-10-20 DIAGNOSIS — R31.29 MICROSCOPIC HEMATURIA: ICD-10-CM

## 2020-10-20 LAB
ALBUMIN SERPL BCP-MCNC: 3.4 G/DL (ref 3.5–5.2)
ALP SERPL-CCNC: 88 U/L (ref 55–135)
ALT SERPL W/O P-5'-P-CCNC: 24 U/L (ref 10–44)
ANION GAP SERPL CALC-SCNC: 9 MMOL/L (ref 8–16)
AST SERPL-CCNC: 16 U/L (ref 10–40)
BASOPHILS NFR BLD: 0 % (ref 0–1.9)
BILIRUB SERPL-MCNC: 0.4 MG/DL (ref 0.1–1)
BUN SERPL-MCNC: 14 MG/DL (ref 6–20)
CALCIUM SERPL-MCNC: 9 MG/DL (ref 8.7–10.5)
CHLORIDE SERPL-SCNC: 105 MMOL/L (ref 95–110)
CO2 SERPL-SCNC: 28 MMOL/L (ref 23–29)
CREAT SERPL-MCNC: 1.2 MG/DL (ref 0.5–1.4)
DIFFERENTIAL METHOD: ABNORMAL
EOSINOPHIL NFR BLD: 0 % (ref 0–8)
ERYTHROCYTE [DISTWIDTH] IN BLOOD BY AUTOMATED COUNT: 14.3 % (ref 11.5–14.5)
EST. GFR  (AFRICAN AMERICAN): >60 ML/MIN/1.73 M^2
EST. GFR  (NON AFRICAN AMERICAN): >60 ML/MIN/1.73 M^2
GLUCOSE SERPL-MCNC: 97 MG/DL (ref 70–110)
HCT VFR BLD AUTO: 39.8 % (ref 40–54)
HGB BLD-MCNC: 12.6 G/DL (ref 14–18)
IMM GRANULOCYTES # BLD AUTO: ABNORMAL K/UL
IMM GRANULOCYTES NFR BLD AUTO: ABNORMAL %
LYMPHOCYTES NFR BLD: 98 % (ref 18–48)
MCH RBC QN AUTO: 31.7 PG (ref 27–31)
MCHC RBC AUTO-ENTMCNC: 31.7 G/DL (ref 32–36)
MCV RBC AUTO: 100 FL (ref 82–98)
MONOCYTES NFR BLD: 1 % (ref 4–15)
NEUTROPHILS NFR BLD: 0 % (ref 38–73)
NEUTS BAND NFR BLD MANUAL: 1 %
NRBC BLD-RTO: 0 /100 WBC
PLATELET # BLD AUTO: 143 K/UL (ref 150–350)
PMV BLD AUTO: 9.3 FL (ref 9.2–12.9)
POTASSIUM SERPL-SCNC: 4.5 MMOL/L (ref 3.5–5.1)
PROT SERPL-MCNC: 5.9 G/DL (ref 6–8.4)
RBC # BLD AUTO: 3.97 M/UL (ref 4.6–6.2)
SMUDGE CELLS BLD QL SMEAR: PRESENT
SODIUM SERPL-SCNC: 142 MMOL/L (ref 136–145)
WBC # BLD AUTO: 69.63 K/UL (ref 3.9–12.7)

## 2020-10-20 PROCEDURE — 88112 PR  CYTOPATH, CELL ENHANCE TECH: ICD-10-PCS | Mod: 26,,, | Performed by: STUDENT IN AN ORGANIZED HEALTH CARE EDUCATION/TRAINING PROGRAM

## 2020-10-20 PROCEDURE — 85060 PATHOLOGIST REVIEW: ICD-10-PCS | Mod: ,,, | Performed by: STUDENT IN AN ORGANIZED HEALTH CARE EDUCATION/TRAINING PROGRAM

## 2020-10-20 PROCEDURE — 99214 OFFICE O/P EST MOD 30 MIN: CPT | Mod: 25,S$PBB,, | Performed by: UROLOGY

## 2020-10-20 PROCEDURE — 85060 BLOOD SMEAR INTERPRETATION: CPT | Mod: ,,, | Performed by: STUDENT IN AN ORGANIZED HEALTH CARE EDUCATION/TRAINING PROGRAM

## 2020-10-20 PROCEDURE — 99214 PR OFFICE/OUTPT VISIT, EST, LEVL IV, 30-39 MIN: ICD-10-PCS | Mod: 25,S$PBB,, | Performed by: UROLOGY

## 2020-10-20 PROCEDURE — 36415 COLL VENOUS BLD VENIPUNCTURE: CPT

## 2020-10-20 PROCEDURE — 88112 CYTOPATH CELL ENHANCE TECH: CPT | Performed by: STUDENT IN AN ORGANIZED HEALTH CARE EDUCATION/TRAINING PROGRAM

## 2020-10-20 PROCEDURE — 81000 URINALYSIS NONAUTO W/SCOPE: CPT | Mod: PBBFAC,PN | Performed by: UROLOGY

## 2020-10-20 PROCEDURE — 87086 URINE CULTURE/COLONY COUNT: CPT

## 2020-10-20 PROCEDURE — 99999 PR PBB SHADOW E&M-EST. PATIENT-LVL III: CPT | Mod: PBBFAC,,, | Performed by: UROLOGY

## 2020-10-20 PROCEDURE — 88112 CYTOPATH CELL ENHANCE TECH: CPT | Mod: 26,,, | Performed by: STUDENT IN AN ORGANIZED HEALTH CARE EDUCATION/TRAINING PROGRAM

## 2020-10-20 PROCEDURE — 80053 COMPREHEN METABOLIC PANEL: CPT

## 2020-10-20 PROCEDURE — 99999 PR PBB SHADOW E&M-EST. PATIENT-LVL III: ICD-10-PCS | Mod: PBBFAC,,, | Performed by: UROLOGY

## 2020-10-20 PROCEDURE — 85007 BL SMEAR W/DIFF WBC COUNT: CPT

## 2020-10-20 PROCEDURE — 99213 OFFICE O/P EST LOW 20 MIN: CPT | Mod: PBBFAC,PN | Performed by: UROLOGY

## 2020-10-20 PROCEDURE — 85027 COMPLETE CBC AUTOMATED: CPT

## 2020-10-20 NOTE — PROGRESS NOTES
OFFICE NOTE  [unfilled]  [id number]  10/20/2020       CHIEF COMPLAINT:   BPH, history of interstitial cystitis     HISTORY OF PRESENT ILLNESS:   this 60-year-old male returns routine recheck.  He has history BPH for which continues to take Flomax  0.4 mg p.o. b.i.d..  He also has history of interstitial cystitis for which he had undergone cystoscopy and bladder hydrodistention and intravesical Botox injection on 03/12/2018.  He had tried Ditropan XL 10 mg which was not very effective and he did try Toviaz that was not very effective.  His main complaint today is nocturia and frequency    Continues to be evaluated and treated by Dr. Simon for his diagnosis of chronic leukemia for which he has not yet required any treatment.    Physical exam:  Abdomen - soft benign nontender no masses no hernias no organomegaly                             External genitalia - normal phallus with adequate meatus testes descended and feel normal no scrotal mass                             Rectal - 25-30 g smooth prostate no nodules normal sphincter tone         PSA  - 3.1 on 04/22/2019     UA - trace of blood pH 7.0     FINAL IMPRESSION:  BPH, interstitial cystitis, microscopic hematuria       RECOMMENDATIONS:   PSA, urine for culture and cytology.  Continue on Flomax 0 point g p.o. b.i.d..  Consider further follow-up  workup to include a renal ultrasound and again cystoscopy bladder hydrodistention.

## 2020-10-20 NOTE — TELEPHONE ENCOUNTER
LM encouraging call back to clinic to get back on Dr. Simon's schedule.  Appt for 10/22 @ 0900 cancelled, as Dr. Simon will be in meeting at that time. This nurse awaiting call back to r/s to date/time convenient for patient.    ----- Message from Breezy Schultz sent at 10/20/2020 10:01 AM CDT -----  Type:  Patient Returning Call    Who Called:  Patient  Who Left Message for Patient:  NA  Does the patient know what this is regarding?:  Rescheduling  Best Call Back Number:  [phone]  Additional Information:

## 2020-10-21 ENCOUNTER — HOSPITAL ENCOUNTER (OUTPATIENT)
Dept: RADIOLOGY | Facility: HOSPITAL | Age: 60
Discharge: HOME | End: 2020-10-21
Attending: UROLOGY
Payer: MEDICARE

## 2020-10-21 DIAGNOSIS — R31.29 MICROSCOPIC HEMATURIA: ICD-10-CM

## 2020-10-21 DIAGNOSIS — N40.1 BENIGN PROSTATIC HYPERPLASIA WITH URINARY FREQUENCY: ICD-10-CM

## 2020-10-21 DIAGNOSIS — R35.0 BENIGN PROSTATIC HYPERPLASIA WITH URINARY FREQUENCY: ICD-10-CM

## 2020-10-21 LAB
BACTERIA UR CULT: NO GROWTH
PATH REV BLD -IMP: NORMAL

## 2020-10-21 PROCEDURE — 76770 US EXAM ABDO BACK WALL COMP: CPT | Mod: 26,,, | Performed by: RADIOLOGY

## 2020-10-21 PROCEDURE — 76770 US RETROPERITONEAL COMPLETE: ICD-10-PCS | Mod: 26,,, | Performed by: RADIOLOGY

## 2020-10-21 PROCEDURE — 76770 US EXAM ABDO BACK WALL COMP: CPT | Mod: TC

## 2020-10-23 LAB — FINAL PATHOLOGIC DIAGNOSIS: NORMAL

## 2020-10-28 ENCOUNTER — CLINICAL SUPPORT (OUTPATIENT)
Dept: UROLOGY | Facility: CLINIC | Age: 60
End: 2020-10-28
Payer: MEDICARE

## 2020-10-28 DIAGNOSIS — R31.29 MICROSCOPIC HEMATURIA: Primary | ICD-10-CM

## 2020-10-28 LAB
BILIRUB SERPL-MCNC: NEGATIVE MG/DL
BLOOD URINE, POC: ABNORMAL
CLARITY, POC UA: CLEAR
COLOR, POC UA: YELLOW
GLUCOSE UR QL STRIP: NEGATIVE
KETONES UR QL STRIP: NEGATIVE
LEUKOCYTE ESTERASE URINE, POC: NEGATIVE
NITRITE, POC UA: NEGATIVE
PH, POC UA: 6
PROTEIN, POC: NEGATIVE
SPECIFIC GRAVITY, POC UA: 1.03
UROBILINOGEN, POC UA: 0.2

## 2020-10-28 PROCEDURE — 81002 URINALYSIS NONAUTO W/O SCOPE: CPT | Mod: PBBFAC,PN

## 2020-10-28 PROCEDURE — 99499 NO LOS: ICD-10-PCS | Mod: S$PBB,,, | Performed by: UROLOGY

## 2020-10-28 PROCEDURE — 99499 UNLISTED E&M SERVICE: CPT | Mod: S$PBB,,, | Performed by: UROLOGY

## 2020-10-28 NOTE — PROGRESS NOTES
Patient arrived to clinic to give urine sample for UA, given clean catch, POCT done, await MD review for advisement.

## 2020-12-16 ENCOUNTER — OFFICE VISIT (OUTPATIENT)
Dept: UROLOGY | Facility: CLINIC | Age: 60
End: 2020-12-16
Payer: MEDICARE

## 2020-12-16 VITALS
DIASTOLIC BLOOD PRESSURE: 81 MMHG | BODY MASS INDEX: 27.66 KG/M2 | SYSTOLIC BLOOD PRESSURE: 138 MMHG | WEIGHT: 197.56 LBS | RESPIRATION RATE: 18 BRPM | TEMPERATURE: 98 F | HEIGHT: 71 IN | HEART RATE: 61 BPM

## 2020-12-16 DIAGNOSIS — R35.0 BENIGN PROSTATIC HYPERPLASIA WITH URINARY FREQUENCY: ICD-10-CM

## 2020-12-16 DIAGNOSIS — N40.1 BENIGN PROSTATIC HYPERPLASIA WITH URINARY FREQUENCY: ICD-10-CM

## 2020-12-16 DIAGNOSIS — N28.1 COMPLEX RENAL CYST: ICD-10-CM

## 2020-12-16 DIAGNOSIS — R31.29 MICROSCOPIC HEMATURIA: ICD-10-CM

## 2020-12-16 DIAGNOSIS — N50.82 SCROTAL PAIN: Primary | ICD-10-CM

## 2020-12-16 DIAGNOSIS — N30.10 INTERSTITIAL CYSTITIS: ICD-10-CM

## 2020-12-16 PROCEDURE — 99214 OFFICE O/P EST MOD 30 MIN: CPT | Mod: 25,S$PBB,, | Performed by: UROLOGY

## 2020-12-16 PROCEDURE — 99213 OFFICE O/P EST LOW 20 MIN: CPT | Mod: PBBFAC,PN | Performed by: UROLOGY

## 2020-12-16 PROCEDURE — 99999 PR PBB SHADOW E&M-EST. PATIENT-LVL III: ICD-10-PCS | Mod: PBBFAC,,, | Performed by: UROLOGY

## 2020-12-16 PROCEDURE — 99999 PR PBB SHADOW E&M-EST. PATIENT-LVL III: CPT | Mod: PBBFAC,,, | Performed by: UROLOGY

## 2020-12-16 PROCEDURE — 99214 PR OFFICE/OUTPT VISIT, EST, LEVL IV, 30-39 MIN: ICD-10-PCS | Mod: 25,S$PBB,, | Performed by: UROLOGY

## 2020-12-16 PROCEDURE — 81000 URINALYSIS NONAUTO W/SCOPE: CPT | Mod: PBBFAC,PN | Performed by: UROLOGY

## 2020-12-16 RX ORDER — ALFUZOSIN HYDROCHLORIDE 10 MG/1
10 TABLET, EXTENDED RELEASE ORAL
Qty: 30 TABLET | Refills: 11 | Status: SHIPPED | OUTPATIENT
Start: 2020-12-16 | End: 2021-02-12

## 2020-12-16 NOTE — PROGRESS NOTES
OFFICE NOTE  [unfilled]  [id number]  12/16/2020       CHIEF COMPLAINT:   BPH, history of hematuria, history of interstitial cystitis     HISTORY OF PRESENT ILLNESS:   this 60-year-old male returns routine recheck.  Has history BPH which he takes Flomax which he states has become less effective.  He has been experiences also some perineal pain.  He does have a history of interstitial cystitis for which he had undergone cystoscopy bladder hydrodistention and intravesical Botox injection on 03/2018 and at his last visit on 10/20/2020 we had discussed the possibility of repeating the procedure in view of the recurrence of his symptoms.  Renal ultrasound on 10/21/2020 revealed bilateral renal cysts.    Physical exam:  Abdomen - soft benign nontender no masses no hernias no organomegaly                             External genitalia - normal phallus with adequate meatus testes descended and feel normal no scrotal mass                             Rectal - 25-30 g smooth prostate no nodules normal sphincter tone         PSA  - 3.1 on 10/21/2020     UA - trace of blood pH 7.0 and must be noted recent urine culture cytology were negative     FINAL IMPRESSION:  BPH, hematuria, interstitial cystitis, perineal discomfort, renal cysts.       RECOMMENDATIONS:  Scrotal ultrasound.  Will place on Uroxatral in place of Flomax and observe the response.  Subsequently consider possible cystoscopy and repeat bladder hydrodistention and Botox injection pending response to the treatment.

## 2020-12-18 ENCOUNTER — HOSPITAL ENCOUNTER (OUTPATIENT)
Dept: RADIOLOGY | Facility: HOSPITAL | Age: 60
Discharge: HOME | End: 2020-12-18
Attending: UROLOGY
Payer: MEDICARE

## 2020-12-18 DIAGNOSIS — N50.82 SCROTAL PAIN: ICD-10-CM

## 2020-12-18 PROCEDURE — 76870 US SCROTUM AND TESTICLES: ICD-10-PCS | Mod: 26,,, | Performed by: RADIOLOGY

## 2020-12-18 PROCEDURE — 76870 US EXAM SCROTUM: CPT | Mod: TC

## 2020-12-18 PROCEDURE — 76870 US EXAM SCROTUM: CPT | Mod: 26,,, | Performed by: RADIOLOGY

## 2021-01-20 ENCOUNTER — PATIENT MESSAGE (OUTPATIENT)
Dept: HEMATOLOGY/ONCOLOGY | Facility: CLINIC | Age: 61
End: 2021-01-20

## 2021-01-20 ENCOUNTER — PATIENT MESSAGE (OUTPATIENT)
Dept: PULMONOLOGY | Facility: CLINIC | Age: 61
End: 2021-01-20

## 2021-01-20 DIAGNOSIS — C91.10 CLL (CHRONIC LYMPHOCYTIC LEUKEMIA): Primary | ICD-10-CM

## 2021-02-04 ENCOUNTER — OFFICE VISIT (OUTPATIENT)
Dept: UROLOGY | Facility: CLINIC | Age: 61
End: 2021-02-04
Payer: MEDICARE

## 2021-02-04 VITALS
HEART RATE: 56 BPM | SYSTOLIC BLOOD PRESSURE: 146 MMHG | RESPIRATION RATE: 18 BRPM | DIASTOLIC BLOOD PRESSURE: 77 MMHG | HEIGHT: 71 IN | BODY MASS INDEX: 27.66 KG/M2 | WEIGHT: 197.56 LBS

## 2021-02-04 DIAGNOSIS — N30.10 INTERSTITIAL CYSTITIS: Primary | ICD-10-CM

## 2021-02-04 DIAGNOSIS — Z01.818 PRE-OP TESTING: ICD-10-CM

## 2021-02-04 DIAGNOSIS — N40.1 BENIGN PROSTATIC HYPERPLASIA WITH LOWER URINARY TRACT SYMPTOMS, SYMPTOM DETAILS UNSPECIFIED: ICD-10-CM

## 2021-02-04 PROCEDURE — 99213 OFFICE O/P EST LOW 20 MIN: CPT | Mod: PBBFAC,PN | Performed by: UROLOGY

## 2021-02-04 PROCEDURE — 99214 OFFICE O/P EST MOD 30 MIN: CPT | Mod: 25,S$PBB,, | Performed by: UROLOGY

## 2021-02-04 PROCEDURE — 99999 PR PBB SHADOW E&M-EST. PATIENT-LVL III: ICD-10-PCS | Mod: PBBFAC,,, | Performed by: UROLOGY

## 2021-02-04 PROCEDURE — 99214 PR OFFICE/OUTPT VISIT, EST, LEVL IV, 30-39 MIN: ICD-10-PCS | Mod: 25,S$PBB,, | Performed by: UROLOGY

## 2021-02-04 PROCEDURE — 99999 PR PBB SHADOW E&M-EST. PATIENT-LVL III: CPT | Mod: PBBFAC,,, | Performed by: UROLOGY

## 2021-02-04 PROCEDURE — 81000 URINALYSIS NONAUTO W/SCOPE: CPT | Mod: PBBFAC,PN | Performed by: UROLOGY

## 2021-02-08 DIAGNOSIS — N30.10 INTERSTITIAL CYSTITIS: Primary | ICD-10-CM

## 2021-02-08 DIAGNOSIS — R31.9 HEMATURIA, UNSPECIFIED TYPE: ICD-10-CM

## 2021-02-08 RX ORDER — CIPROFLOXACIN 2 MG/ML
400 INJECTION, SOLUTION INTRAVENOUS
Status: CANCELLED | OUTPATIENT
Start: 2021-02-15

## 2021-02-12 ENCOUNTER — HOSPITAL ENCOUNTER (OUTPATIENT)
Dept: PREADMISSION TESTING | Facility: HOSPITAL | Age: 61
Discharge: HOME | End: 2021-02-12
Attending: UROLOGY
Payer: MEDICARE

## 2021-02-12 ENCOUNTER — LAB VISIT (OUTPATIENT)
Dept: PRIMARY CARE CLINIC | Facility: CLINIC | Age: 61
End: 2021-02-12
Payer: MEDICARE

## 2021-02-12 ENCOUNTER — TELEPHONE (OUTPATIENT)
Dept: UROLOGY | Facility: CLINIC | Age: 61
End: 2021-02-12

## 2021-02-12 ENCOUNTER — DOCUMENTATION ONLY (OUTPATIENT)
Dept: UROLOGY | Facility: CLINIC | Age: 61
End: 2021-02-12

## 2021-02-12 ENCOUNTER — HOSPITAL ENCOUNTER (OUTPATIENT)
Dept: RADIOLOGY | Facility: HOSPITAL | Age: 61
Discharge: HOME | End: 2021-02-12
Attending: UROLOGY
Payer: MEDICARE

## 2021-02-12 DIAGNOSIS — Z01.818 PRE-OP TESTING: ICD-10-CM

## 2021-02-12 DIAGNOSIS — Z01.818 PRE-OP EXAM: ICD-10-CM

## 2021-02-12 DIAGNOSIS — Z01.818 PREOP TESTING: Primary | ICD-10-CM

## 2021-02-12 PROCEDURE — U0005 INFEC AGEN DETEC AMPLI PROBE: HCPCS

## 2021-02-12 PROCEDURE — 93010 ELECTROCARDIOGRAM REPORT: CPT | Mod: ,,, | Performed by: INTERNAL MEDICINE

## 2021-02-12 PROCEDURE — 71046 X-RAY EXAM CHEST 2 VIEWS: CPT | Mod: TC,FY

## 2021-02-12 PROCEDURE — 71046 X-RAY EXAM CHEST 2 VIEWS: CPT | Mod: 26,,, | Performed by: RADIOLOGY

## 2021-02-12 PROCEDURE — 99900104 DSU ONLY-NO CHARGE-EA ADD'L HR (STAT)

## 2021-02-12 PROCEDURE — 93005 ELECTROCARDIOGRAM TRACING: CPT

## 2021-02-12 PROCEDURE — 93010 EKG 12-LEAD: ICD-10-PCS | Mod: ,,, | Performed by: INTERNAL MEDICINE

## 2021-02-12 PROCEDURE — U0003 INFECTIOUS AGENT DETECTION BY NUCLEIC ACID (DNA OR RNA); SEVERE ACUTE RESPIRATORY SYNDROME CORONAVIRUS 2 (SARS-COV-2) (CORONAVIRUS DISEASE [COVID-19]), AMPLIFIED PROBE TECHNIQUE, MAKING USE OF HIGH THROUGHPUT TECHNOLOGIES AS DESCRIBED BY CMS-2020-01-R: HCPCS

## 2021-02-12 PROCEDURE — 99900103 DSU ONLY-NO CHARGE-INITIAL HR (STAT)

## 2021-02-12 PROCEDURE — 71046 XR CHEST PA AND LATERAL: ICD-10-PCS | Mod: 26,,, | Performed by: RADIOLOGY

## 2021-02-13 LAB — SARS-COV-2 RNA RESP QL NAA+PROBE: NOT DETECTED

## 2021-02-17 ENCOUNTER — PATIENT MESSAGE (OUTPATIENT)
Dept: HEMATOLOGY/ONCOLOGY | Facility: CLINIC | Age: 61
End: 2021-02-17

## 2021-03-01 ENCOUNTER — PATIENT MESSAGE (OUTPATIENT)
Dept: HEMATOLOGY/ONCOLOGY | Facility: CLINIC | Age: 61
End: 2021-03-01

## 2021-03-01 ENCOUNTER — TELEPHONE (OUTPATIENT)
Dept: HEMATOLOGY/ONCOLOGY | Facility: CLINIC | Age: 61
End: 2021-03-01

## 2021-03-01 ENCOUNTER — PATIENT MESSAGE (OUTPATIENT)
Dept: UROLOGY | Facility: CLINIC | Age: 61
End: 2021-03-01

## 2021-03-15 ENCOUNTER — PATIENT MESSAGE (OUTPATIENT)
Dept: HEMATOLOGY/ONCOLOGY | Facility: CLINIC | Age: 61
End: 2021-03-15

## 2021-03-15 ENCOUNTER — TELEPHONE (OUTPATIENT)
Dept: HEMATOLOGY/ONCOLOGY | Facility: CLINIC | Age: 61
End: 2021-03-15

## 2021-03-23 ENCOUNTER — TELEPHONE (OUTPATIENT)
Dept: PULMONOLOGY | Facility: CLINIC | Age: 61
End: 2021-03-23

## 2021-03-23 ENCOUNTER — TELEPHONE (OUTPATIENT)
Dept: CARDIOLOGY | Facility: CLINIC | Age: 61
End: 2021-03-23

## 2021-03-23 ENCOUNTER — OFFICE VISIT (OUTPATIENT)
Dept: PULMONOLOGY | Facility: CLINIC | Age: 61
End: 2021-03-23
Payer: MEDICARE

## 2021-03-23 ENCOUNTER — LAB VISIT (OUTPATIENT)
Dept: LAB | Facility: HOSPITAL | Age: 61
End: 2021-03-23
Attending: INTERNAL MEDICINE
Payer: MEDICARE

## 2021-03-23 ENCOUNTER — OFFICE VISIT (OUTPATIENT)
Dept: HEMATOLOGY/ONCOLOGY | Facility: CLINIC | Age: 61
End: 2021-03-23
Payer: MEDICARE

## 2021-03-23 ENCOUNTER — DOCUMENTATION ONLY (OUTPATIENT)
Dept: HEMATOLOGY/ONCOLOGY | Facility: CLINIC | Age: 61
End: 2021-03-23

## 2021-03-23 VITALS
HEIGHT: 71 IN | HEART RATE: 66 BPM | OXYGEN SATURATION: 97 % | DIASTOLIC BLOOD PRESSURE: 70 MMHG | SYSTOLIC BLOOD PRESSURE: 136 MMHG | TEMPERATURE: 98 F | WEIGHT: 200.81 LBS | RESPIRATION RATE: 17 BRPM | BODY MASS INDEX: 28.11 KG/M2

## 2021-03-23 VITALS
WEIGHT: 201.5 LBS | HEIGHT: 71 IN | SYSTOLIC BLOOD PRESSURE: 149 MMHG | BODY MASS INDEX: 28.21 KG/M2 | OXYGEN SATURATION: 97 % | DIASTOLIC BLOOD PRESSURE: 72 MMHG | HEART RATE: 58 BPM

## 2021-03-23 DIAGNOSIS — R07.9 CHEST PAIN, UNSPECIFIED TYPE: ICD-10-CM

## 2021-03-23 DIAGNOSIS — R50.9 FEVER, UNSPECIFIED FEVER CAUSE: ICD-10-CM

## 2021-03-23 DIAGNOSIS — G25.81 RESTLESS LEG SYNDROME, CONTROLLED: ICD-10-CM

## 2021-03-23 DIAGNOSIS — C91.10 CLL (CHRONIC LYMPHOCYTIC LEUKEMIA): Primary | ICD-10-CM

## 2021-03-23 DIAGNOSIS — D84.9 IMMUNE DEFICIENCY DISORDER: ICD-10-CM

## 2021-03-23 DIAGNOSIS — R07.9 CHEST PAIN, UNSPECIFIED TYPE: Primary | ICD-10-CM

## 2021-03-23 DIAGNOSIS — R16.1 SPLENOMEGALY: ICD-10-CM

## 2021-03-23 DIAGNOSIS — C91.10 CLL (CHRONIC LYMPHOCYTIC LEUKEMIA): ICD-10-CM

## 2021-03-23 DIAGNOSIS — J82.83 EOSINOPHILIC ASTHMA: Primary | ICD-10-CM

## 2021-03-23 DIAGNOSIS — M79.604 PAIN IN BOTH LOWER EXTREMITIES: ICD-10-CM

## 2021-03-23 DIAGNOSIS — R53.1 WEAKNESS: ICD-10-CM

## 2021-03-23 DIAGNOSIS — M79.605 PAIN IN BOTH LOWER EXTREMITIES: ICD-10-CM

## 2021-03-23 DIAGNOSIS — D69.6 THROMBOCYTOPENIA: ICD-10-CM

## 2021-03-23 LAB
ALBUMIN SERPL BCP-MCNC: 3.8 G/DL (ref 3.5–5.2)
ALP SERPL-CCNC: 83 U/L (ref 55–135)
ALT SERPL W/O P-5'-P-CCNC: 23 U/L (ref 10–44)
ANION GAP SERPL CALC-SCNC: 8 MMOL/L (ref 8–16)
AST SERPL-CCNC: 24 U/L (ref 10–40)
BASOPHILS NFR BLD: 0 % (ref 0–1.9)
BILIRUB SERPL-MCNC: 0.5 MG/DL (ref 0.1–1)
BUN SERPL-MCNC: 24 MG/DL (ref 8–23)
CALCIUM SERPL-MCNC: 9.2 MG/DL (ref 8.7–10.5)
CHLORIDE SERPL-SCNC: 105 MMOL/L (ref 95–110)
CO2 SERPL-SCNC: 30 MMOL/L (ref 23–29)
CREAT SERPL-MCNC: 1.2 MG/DL (ref 0.5–1.4)
DIFFERENTIAL METHOD: ABNORMAL
EOSINOPHIL NFR BLD: 0 % (ref 0–8)
ERYTHROCYTE [DISTWIDTH] IN BLOOD BY AUTOMATED COUNT: 13.9 % (ref 11.5–14.5)
EST. GFR  (AFRICAN AMERICAN): >60 ML/MIN/1.73 M^2
EST. GFR  (NON AFRICAN AMERICAN): >60 ML/MIN/1.73 M^2
GLUCOSE SERPL-MCNC: 95 MG/DL (ref 70–110)
HCT VFR BLD AUTO: 38.6 % (ref 40–54)
HGB BLD-MCNC: 12.4 G/DL (ref 14–18)
IGG SERPL-MCNC: 329 MG/DL (ref 650–1600)
IGM SERPL-MCNC: 14 MG/DL (ref 50–300)
IMM GRANULOCYTES # BLD AUTO: ABNORMAL K/UL
IMM GRANULOCYTES NFR BLD AUTO: ABNORMAL %
LYMPHOCYTES NFR BLD: 98 % (ref 18–48)
MCH RBC QN AUTO: 32.1 PG (ref 27–31)
MCHC RBC AUTO-ENTMCNC: 32.1 G/DL (ref 32–36)
MCV RBC AUTO: 100 FL (ref 82–98)
MONOCYTES NFR BLD: 1 % (ref 4–15)
NEUTROPHILS NFR BLD: 1 % (ref 38–73)
NRBC BLD-RTO: 0 /100 WBC
PLATELET # BLD AUTO: 118 K/UL (ref 150–350)
PLATELET BLD QL SMEAR: ABNORMAL
PMV BLD AUTO: 9.4 FL (ref 9.2–12.9)
POTASSIUM SERPL-SCNC: 4.6 MMOL/L (ref 3.5–5.1)
PROT SERPL-MCNC: 6 G/DL (ref 6–8.4)
RBC # BLD AUTO: 3.86 M/UL (ref 4.6–6.2)
SODIUM SERPL-SCNC: 143 MMOL/L (ref 136–145)
WBC # BLD AUTO: 57.13 K/UL (ref 3.9–12.7)

## 2021-03-23 PROCEDURE — 99999 PR PBB SHADOW E&M-EST. PATIENT-LVL V: ICD-10-PCS | Mod: PBBFAC,,, | Performed by: INTERNAL MEDICINE

## 2021-03-23 PROCEDURE — 85027 COMPLETE CBC AUTOMATED: CPT | Performed by: INTERNAL MEDICINE

## 2021-03-23 PROCEDURE — 99215 OFFICE O/P EST HI 40 MIN: CPT | Mod: PBBFAC,PO | Performed by: INTERNAL MEDICINE

## 2021-03-23 PROCEDURE — 82784 ASSAY IGA/IGD/IGG/IGM EACH: CPT | Mod: 59 | Performed by: INTERNAL MEDICINE

## 2021-03-23 PROCEDURE — 99999 PR PBB SHADOW E&M-EST. PATIENT-LVL V: CPT | Mod: PBBFAC,,, | Performed by: INTERNAL MEDICINE

## 2021-03-23 PROCEDURE — 99214 PR OFFICE/OUTPT VISIT, EST, LEVL IV, 30-39 MIN: ICD-10-PCS | Mod: S$PBB,,, | Performed by: INTERNAL MEDICINE

## 2021-03-23 PROCEDURE — 99214 OFFICE O/P EST MOD 30 MIN: CPT | Mod: PBBFAC,27,PO | Performed by: INTERNAL MEDICINE

## 2021-03-23 PROCEDURE — 99214 OFFICE O/P EST MOD 30 MIN: CPT | Mod: S$PBB,,, | Performed by: INTERNAL MEDICINE

## 2021-03-23 PROCEDURE — 85007 BL SMEAR W/DIFF WBC COUNT: CPT | Performed by: INTERNAL MEDICINE

## 2021-03-23 PROCEDURE — 36415 COLL VENOUS BLD VENIPUNCTURE: CPT | Performed by: INTERNAL MEDICINE

## 2021-03-23 PROCEDURE — 82784 ASSAY IGA/IGD/IGG/IGM EACH: CPT | Performed by: INTERNAL MEDICINE

## 2021-03-23 PROCEDURE — 99215 OFFICE O/P EST HI 40 MIN: CPT | Mod: S$PBB,,, | Performed by: INTERNAL MEDICINE

## 2021-03-23 PROCEDURE — 80053 COMPREHEN METABOLIC PANEL: CPT | Performed by: INTERNAL MEDICINE

## 2021-03-23 PROCEDURE — 99999 PR PBB SHADOW E&M-EST. PATIENT-LVL IV: CPT | Mod: PBBFAC,,, | Performed by: INTERNAL MEDICINE

## 2021-03-23 PROCEDURE — 99215 PR OFFICE/OUTPT VISIT, EST, LEVL V, 40-54 MIN: ICD-10-PCS | Mod: S$PBB,,, | Performed by: INTERNAL MEDICINE

## 2021-03-23 PROCEDURE — 99999 PR PBB SHADOW E&M-EST. PATIENT-LVL IV: ICD-10-PCS | Mod: PBBFAC,,, | Performed by: INTERNAL MEDICINE

## 2021-03-23 RX ORDER — CLONAZEPAM 0.25 MG/1
0.5 TABLET, ORALLY DISINTEGRATING ORAL NIGHTLY
Qty: 60 TABLET | Refills: 2 | Status: SHIPPED | OUTPATIENT
Start: 2021-03-23 | End: 2021-09-27 | Stop reason: SDUPTHER

## 2021-03-23 RX ORDER — ASCORBIC ACID 500 MG
500 TABLET ORAL
COMMUNITY

## 2021-03-23 RX ORDER — DOXYCYCLINE 100 MG/1
100 CAPSULE ORAL DAILY
Qty: 21 CAPSULE | Refills: 0 | Status: SHIPPED | OUTPATIENT
Start: 2021-03-23 | End: 2021-04-22

## 2021-03-23 RX ORDER — ACETAMINOPHEN 500 MG
1 TABLET ORAL
COMMUNITY

## 2021-03-24 ENCOUNTER — TELEPHONE (OUTPATIENT)
Dept: PULMONOLOGY | Facility: CLINIC | Age: 61
End: 2021-03-24

## 2021-03-30 ENCOUNTER — HOSPITAL ENCOUNTER (OUTPATIENT)
Dept: RADIOLOGY | Facility: HOSPITAL | Age: 61
Discharge: HOME | End: 2021-03-30
Attending: INTERNAL MEDICINE
Payer: MEDICARE

## 2021-03-30 ENCOUNTER — CLINICAL SUPPORT (OUTPATIENT)
Dept: CARDIOLOGY | Facility: HOSPITAL | Age: 61
End: 2021-03-30
Attending: INTERNAL MEDICINE
Payer: MEDICARE

## 2021-03-30 DIAGNOSIS — R07.9 CHEST PAIN, UNSPECIFIED TYPE: ICD-10-CM

## 2021-03-30 LAB
CV PHARM DOSE: 0.4 MG
CV STRESS BASE HR: 58 BPM
DIASTOLIC BLOOD PRESSURE: 73 MMHG
OHS CV CPX 1 MINUTE RECOVERY HEART RATE: 98 BPM
OHS CV CPX 85 PERCENT MAX PREDICTED HEART RATE MALE: 135
OHS CV CPX MAX PREDICTED HEART RATE: 159
OHS CV CPX PATIENT IS FEMALE: 0
OHS CV CPX PATIENT IS MALE: 1
OHS CV CPX PEAK DIASTOLIC BLOOD PRESSURE: 67 MMHG
OHS CV CPX PEAK HEAR RATE: 98 BPM
OHS CV CPX PEAK RATE PRESSURE PRODUCT: NORMAL
OHS CV CPX PEAK SYSTOLIC BLOOD PRESSURE: 138 MMHG
OHS CV CPX PERCENT MAX PREDICTED HEART RATE ACHIEVED: 62
OHS CV CPX RATE PRESSURE PRODUCT PRESENTING: 7250
SYSTOLIC BLOOD PRESSURE: 125 MMHG

## 2021-03-30 PROCEDURE — 93018 CV STRESS TEST I&R ONLY: CPT | Mod: ,,, | Performed by: INTERNAL MEDICINE

## 2021-03-30 PROCEDURE — 63600175 PHARM REV CODE 636 W HCPCS: Performed by: INTERNAL MEDICINE

## 2021-03-30 PROCEDURE — 93018 NUCLEAR STRESS TEST (CUPID ONLY): ICD-10-PCS | Mod: ,,, | Performed by: INTERNAL MEDICINE

## 2021-03-30 PROCEDURE — 93016 NUCLEAR STRESS TEST (CUPID ONLY): ICD-10-PCS | Mod: ,,, | Performed by: INTERNAL MEDICINE

## 2021-03-30 PROCEDURE — 78452 HT MUSCLE IMAGE SPECT MULT: CPT | Mod: TC

## 2021-03-30 PROCEDURE — A9502 TC99M TETROFOSMIN: HCPCS

## 2021-03-30 PROCEDURE — 93016 CV STRESS TEST SUPVJ ONLY: CPT | Mod: ,,, | Performed by: INTERNAL MEDICINE

## 2021-03-30 PROCEDURE — 93017 CV STRESS TEST TRACING ONLY: CPT

## 2021-03-30 RX ORDER — REGADENOSON 0.08 MG/ML
0.4 INJECTION, SOLUTION INTRAVENOUS ONCE
Status: COMPLETED | OUTPATIENT
Start: 2021-03-30 | End: 2021-03-30

## 2021-03-30 RX ADMIN — REGADENOSON 0.4 MG: 0.08 INJECTION, SOLUTION INTRAVENOUS at 09:03

## 2021-03-31 ENCOUNTER — TELEPHONE (OUTPATIENT)
Dept: PULMONOLOGY | Facility: CLINIC | Age: 61
End: 2021-03-31

## 2021-03-31 DIAGNOSIS — R09.89 CHRONIC SINUS COMPLAINTS: ICD-10-CM

## 2021-03-31 DIAGNOSIS — J68.3: ICD-10-CM

## 2021-03-31 DIAGNOSIS — J82.83 EOSINOPHILIC ASTHMA: ICD-10-CM

## 2021-03-31 RX ORDER — MONTELUKAST SODIUM 10 MG/1
10 TABLET ORAL NIGHTLY
Qty: 90 TABLET | Refills: 3 | Status: SHIPPED | OUTPATIENT
Start: 2021-03-31 | End: 2022-07-25 | Stop reason: SDUPTHER

## 2021-04-29 ENCOUNTER — OFFICE VISIT (OUTPATIENT)
Dept: CARDIOLOGY | Facility: CLINIC | Age: 61
End: 2021-04-29
Payer: MEDICARE

## 2021-04-29 VITALS
HEIGHT: 71 IN | SYSTOLIC BLOOD PRESSURE: 150 MMHG | OXYGEN SATURATION: 96 % | BODY MASS INDEX: 28.14 KG/M2 | DIASTOLIC BLOOD PRESSURE: 80 MMHG | HEART RATE: 63 BPM | WEIGHT: 201 LBS

## 2021-04-29 DIAGNOSIS — R53.83 FATIGUE, UNSPECIFIED TYPE: Primary | ICD-10-CM

## 2021-04-29 DIAGNOSIS — Z13.6 ENCOUNTER FOR SCREENING FOR CARDIOVASCULAR DISORDERS: ICD-10-CM

## 2021-04-29 DIAGNOSIS — R07.9 CHEST PAIN, UNSPECIFIED TYPE: ICD-10-CM

## 2021-04-29 DIAGNOSIS — I25.10 ATHEROSCLEROSIS OF NATIVE CORONARY ARTERY OF NATIVE HEART WITHOUT ANGINA PECTORIS: ICD-10-CM

## 2021-04-29 DIAGNOSIS — I25.10 ASCVD (ARTERIOSCLEROTIC CARDIOVASCULAR DISEASE): ICD-10-CM

## 2021-04-29 PROCEDURE — 99204 PR OFFICE/OUTPT VISIT, NEW, LEVL IV, 45-59 MIN: ICD-10-PCS | Mod: S$GLB,,, | Performed by: SPECIALIST

## 2021-04-29 PROCEDURE — 99204 OFFICE O/P NEW MOD 45 MIN: CPT | Mod: S$GLB,,, | Performed by: SPECIALIST

## 2021-04-29 RX ORDER — ATORVASTATIN CALCIUM 10 MG/1
10 TABLET, FILM COATED ORAL DAILY
Qty: 30 TABLET | Refills: 11 | Status: SHIPPED | OUTPATIENT
Start: 2021-04-29 | End: 2023-10-04 | Stop reason: ALTCHOICE

## 2021-04-29 RX ORDER — FAMOTIDINE 40 MG/1
40 TABLET, FILM COATED ORAL NIGHTLY
COMMUNITY
Start: 2021-04-27

## 2021-04-29 RX ORDER — NITROGLYCERIN 0.4 MG/1
0.4 TABLET SUBLINGUAL EVERY 5 MIN PRN
Qty: 25 TABLET | Refills: 6 | Status: SHIPPED | OUTPATIENT
Start: 2021-04-29 | End: 2022-12-01

## 2021-05-04 ENCOUNTER — HOSPITAL ENCOUNTER (OUTPATIENT)
Dept: RADIOLOGY | Facility: HOSPITAL | Age: 61
Discharge: HOME | End: 2021-05-04
Attending: SPECIALIST
Payer: MEDICARE

## 2021-05-04 DIAGNOSIS — I25.10 ATHEROSCLEROSIS OF NATIVE CORONARY ARTERY OF NATIVE HEART WITHOUT ANGINA PECTORIS: ICD-10-CM

## 2021-05-04 DIAGNOSIS — Z13.6 ENCOUNTER FOR SCREENING FOR CARDIOVASCULAR DISORDERS: ICD-10-CM

## 2021-05-04 PROCEDURE — 75571 CT HRT W/O DYE W/CA TEST: CPT | Mod: TC

## 2021-05-25 DIAGNOSIS — N13.8 BPH WITH OBSTRUCTION/LOWER URINARY TRACT SYMPTOMS: ICD-10-CM

## 2021-05-25 DIAGNOSIS — J68.3: ICD-10-CM

## 2021-05-25 DIAGNOSIS — N40.1 BPH WITH OBSTRUCTION/LOWER URINARY TRACT SYMPTOMS: ICD-10-CM

## 2021-05-25 DIAGNOSIS — J45.50 SEVERE PERSISTENT ASTHMA WITHOUT COMPLICATION: Primary | ICD-10-CM

## 2021-05-25 DIAGNOSIS — D84.9 IMMUNE DEFICIENCY DISORDER: ICD-10-CM

## 2021-05-25 DIAGNOSIS — J82.83 EOSINOPHILIC ASTHMA: ICD-10-CM

## 2021-05-25 DIAGNOSIS — R09.89 CHRONIC SINUS COMPLAINTS: ICD-10-CM

## 2021-05-25 RX ORDER — FLUTICASONE PROPIONATE 50 MCG
2 SPRAY, SUSPENSION (ML) NASAL
Qty: 15.8 ML | Refills: 0 | Status: SHIPPED | OUTPATIENT
Start: 2021-05-25

## 2021-05-25 RX ORDER — PREDNISONE 20 MG/1
TABLET ORAL
Qty: 18 TABLET | Refills: 0 | Status: SHIPPED | OUTPATIENT
Start: 2021-05-25 | End: 2022-12-01

## 2021-05-25 RX ORDER — ALBUTEROL SULFATE 90 UG/1
2 AEROSOL, METERED RESPIRATORY (INHALATION) EVERY 6 HOURS PRN
Qty: 18 G | Refills: 2 | Status: SHIPPED | OUTPATIENT
Start: 2021-05-25 | End: 2021-06-23 | Stop reason: SDUPTHER

## 2021-05-25 RX ORDER — AZITHROMYCIN 500 MG/1
TABLET, FILM COATED ORAL
Qty: 3 TABLET | Refills: 0 | Status: SHIPPED | OUTPATIENT
Start: 2021-05-25 | End: 2022-12-01

## 2021-05-26 RX ORDER — TAMSULOSIN HYDROCHLORIDE 0.4 MG/1
0.8 CAPSULE ORAL DAILY
Qty: 60 CAPSULE | Refills: 3 | OUTPATIENT
Start: 2021-05-26 | End: 2022-05-26

## 2021-06-16 ENCOUNTER — PATIENT MESSAGE (OUTPATIENT)
Dept: HEMATOLOGY/ONCOLOGY | Facility: CLINIC | Age: 61
End: 2021-06-16

## 2021-06-17 ENCOUNTER — HOSPITAL ENCOUNTER (OUTPATIENT)
Dept: RADIOLOGY | Facility: HOSPITAL | Age: 61
Discharge: HOME | End: 2021-06-17
Attending: INTERNAL MEDICINE
Payer: MEDICARE

## 2021-06-17 ENCOUNTER — DOCUMENTATION ONLY (OUTPATIENT)
Dept: HEMATOLOGY/ONCOLOGY | Facility: CLINIC | Age: 61
End: 2021-06-17

## 2021-06-17 DIAGNOSIS — C91.10 CLL (CHRONIC LYMPHOCYTIC LEUKEMIA): ICD-10-CM

## 2021-06-17 PROCEDURE — 74177 CT CHEST ABDOMEN PELVIS WITH CONTRAST (XPD): ICD-10-PCS | Mod: 26,,, | Performed by: RADIOLOGY

## 2021-06-17 PROCEDURE — 71260 CT CHEST ABDOMEN PELVIS WITH CONTRAST (XPD): ICD-10-PCS | Mod: 26,,, | Performed by: RADIOLOGY

## 2021-06-17 PROCEDURE — 25500020 PHARM REV CODE 255: Performed by: INTERNAL MEDICINE

## 2021-06-17 PROCEDURE — 74177 CT ABD & PELVIS W/CONTRAST: CPT | Mod: TC

## 2021-06-17 PROCEDURE — 25500020 PHARM REV CODE 255

## 2021-06-17 PROCEDURE — 74177 CT ABD & PELVIS W/CONTRAST: CPT | Mod: 26,,, | Performed by: RADIOLOGY

## 2021-06-17 PROCEDURE — 71260 CT THORAX DX C+: CPT | Mod: 26,,, | Performed by: RADIOLOGY

## 2021-06-17 PROCEDURE — A9698 NON-RAD CONTRAST MATERIALNOC: HCPCS | Performed by: INTERNAL MEDICINE

## 2021-06-17 RX ADMIN — IOHEXOL 1000 ML: 12 SOLUTION ORAL at 12:06

## 2021-06-17 RX ADMIN — IOHEXOL 100 ML: 350 INJECTION, SOLUTION INTRAVENOUS at 12:06

## 2021-06-22 ENCOUNTER — TELEPHONE (OUTPATIENT)
Dept: HEMATOLOGY/ONCOLOGY | Facility: CLINIC | Age: 61
End: 2021-06-22

## 2021-06-22 ENCOUNTER — PATIENT MESSAGE (OUTPATIENT)
Dept: PULMONOLOGY | Facility: CLINIC | Age: 61
End: 2021-06-22

## 2021-06-22 ENCOUNTER — PATIENT MESSAGE (OUTPATIENT)
Dept: HEMATOLOGY/ONCOLOGY | Facility: CLINIC | Age: 61
End: 2021-06-22

## 2021-06-22 ENCOUNTER — OFFICE VISIT (OUTPATIENT)
Dept: HEMATOLOGY/ONCOLOGY | Facility: CLINIC | Age: 61
End: 2021-06-22
Payer: MEDICARE

## 2021-06-22 DIAGNOSIS — C91.10 CLL (CHRONIC LYMPHOCYTIC LEUKEMIA): Primary | ICD-10-CM

## 2021-06-22 PROCEDURE — 99213 OFFICE O/P EST LOW 20 MIN: CPT | Mod: 95,,, | Performed by: INTERNAL MEDICINE

## 2021-06-22 PROCEDURE — 99213 PR OFFICE/OUTPT VISIT, EST, LEVL III, 20-29 MIN: ICD-10-PCS | Mod: 95,,, | Performed by: INTERNAL MEDICINE

## 2021-06-23 ENCOUNTER — OFFICE VISIT (OUTPATIENT)
Dept: PULMONOLOGY | Facility: CLINIC | Age: 61
End: 2021-06-23
Payer: MEDICARE

## 2021-06-23 ENCOUNTER — HOSPITAL ENCOUNTER (OUTPATIENT)
Dept: RADIOLOGY | Facility: HOSPITAL | Age: 61
Discharge: HOME | End: 2021-06-23
Attending: INTERNAL MEDICINE
Payer: MEDICARE

## 2021-06-23 VITALS
HEART RATE: 64 BPM | HEIGHT: 71 IN | WEIGHT: 205.56 LBS | DIASTOLIC BLOOD PRESSURE: 77 MMHG | SYSTOLIC BLOOD PRESSURE: 139 MMHG | OXYGEN SATURATION: 96 % | BODY MASS INDEX: 28.78 KG/M2

## 2021-06-23 DIAGNOSIS — C91.10 CLL (CHRONIC LYMPHOCYTIC LEUKEMIA): Primary | ICD-10-CM

## 2021-06-23 DIAGNOSIS — J82.83 EOSINOPHILIC ASTHMA: ICD-10-CM

## 2021-06-23 DIAGNOSIS — D84.9 IMMUNE DEFICIENCY DISORDER: ICD-10-CM

## 2021-06-23 DIAGNOSIS — J68.3: ICD-10-CM

## 2021-06-23 DIAGNOSIS — J45.50 SEVERE PERSISTENT ASTHMA WITHOUT COMPLICATION: ICD-10-CM

## 2021-06-23 DIAGNOSIS — J44.1 COPD EXACERBATION: ICD-10-CM

## 2021-06-23 DIAGNOSIS — J45.51 SEVERE PERSISTENT ASTHMA WITH EXACERBATION: ICD-10-CM

## 2021-06-23 PROCEDURE — 71046 XR CHEST PA AND LATERAL: ICD-10-PCS | Mod: 26,,, | Performed by: RADIOLOGY

## 2021-06-23 PROCEDURE — 99999 PR PBB SHADOW E&M-EST. PATIENT-LVL V: CPT | Mod: PBBFAC,,, | Performed by: INTERNAL MEDICINE

## 2021-06-23 PROCEDURE — 99999 PR PBB SHADOW E&M-EST. PATIENT-LVL V: ICD-10-PCS | Mod: PBBFAC,,, | Performed by: INTERNAL MEDICINE

## 2021-06-23 PROCEDURE — 99214 OFFICE O/P EST MOD 30 MIN: CPT | Mod: S$PBB,,, | Performed by: INTERNAL MEDICINE

## 2021-06-23 PROCEDURE — 99215 OFFICE O/P EST HI 40 MIN: CPT | Mod: PBBFAC,PO | Performed by: INTERNAL MEDICINE

## 2021-06-23 PROCEDURE — 71046 X-RAY EXAM CHEST 2 VIEWS: CPT | Mod: 26,,, | Performed by: RADIOLOGY

## 2021-06-23 PROCEDURE — 71046 X-RAY EXAM CHEST 2 VIEWS: CPT | Mod: TC,FY

## 2021-06-23 PROCEDURE — 99214 PR OFFICE/OUTPT VISIT, EST, LEVL IV, 30-39 MIN: ICD-10-PCS | Mod: S$PBB,,, | Performed by: INTERNAL MEDICINE

## 2021-06-23 RX ORDER — AZITHROMYCIN 500 MG/1
TABLET, FILM COATED ORAL
Qty: 3 TABLET | Refills: 3 | Status: SHIPPED | OUTPATIENT
Start: 2021-06-23 | End: 2022-12-01

## 2021-06-23 RX ORDER — PREDNISONE 10 MG/1
TABLET ORAL
Qty: 42 TABLET | Refills: 2 | Status: SHIPPED | OUTPATIENT
Start: 2021-06-23 | End: 2021-09-27 | Stop reason: SDUPTHER

## 2021-06-23 RX ORDER — LEVOFLOXACIN 750 MG/1
750 TABLET ORAL DAILY
Qty: 5 TABLET | Refills: 2 | Status: SHIPPED | OUTPATIENT
Start: 2021-06-23 | End: 2021-06-28

## 2021-06-23 RX ORDER — IPRATROPIUM BROMIDE AND ALBUTEROL SULFATE 2.5; .5 MG/3ML; MG/3ML
3 SOLUTION RESPIRATORY (INHALATION) EVERY 6 HOURS PRN
Qty: 360 VIAL | Refills: 3 | Status: SHIPPED | OUTPATIENT
Start: 2021-06-23 | End: 2022-06-23

## 2021-06-23 RX ORDER — ALBUTEROL SULFATE 90 UG/1
2 AEROSOL, METERED RESPIRATORY (INHALATION) EVERY 4 HOURS PRN
Qty: 34 G | Refills: 3 | Status: SHIPPED | OUTPATIENT
Start: 2021-06-23

## 2021-06-23 RX ORDER — BUDESONIDE, GLYCOPYRROLATE, AND FORMOTEROL FUMARATE 160; 9; 4.8 UG/1; UG/1; UG/1
2 AEROSOL, METERED RESPIRATORY (INHALATION) 2 TIMES DAILY
Qty: 32.1 G | Refills: 3 | Status: SHIPPED | OUTPATIENT
Start: 2021-06-23 | End: 2022-04-11 | Stop reason: SDUPTHER

## 2021-06-23 RX ORDER — DOXYCYCLINE 100 MG/1
100 CAPSULE ORAL DAILY
Qty: 90 CAPSULE | Refills: 1 | Status: SHIPPED | OUTPATIENT
Start: 2021-06-23 | End: 2022-03-31 | Stop reason: SDUPTHER

## 2021-06-23 RX ORDER — FINASTERIDE 5 MG/1
5 TABLET, FILM COATED ORAL DAILY
COMMUNITY
Start: 2021-06-18

## 2021-06-24 ENCOUNTER — PATIENT MESSAGE (OUTPATIENT)
Dept: PULMONOLOGY | Facility: CLINIC | Age: 61
End: 2021-06-24

## 2021-06-28 ENCOUNTER — PATIENT MESSAGE (OUTPATIENT)
Dept: PULMONOLOGY | Facility: CLINIC | Age: 61
End: 2021-06-28

## 2021-06-28 DIAGNOSIS — B95.8 STAPH INFECTION: Primary | ICD-10-CM

## 2021-06-28 RX ORDER — CEPHALEXIN 500 MG/1
500 CAPSULE ORAL EVERY 6 HOURS
Qty: 56 CAPSULE | Refills: 1 | Status: SHIPPED | OUTPATIENT
Start: 2021-06-28 | End: 2023-10-04

## 2021-07-02 ENCOUNTER — PATIENT MESSAGE (OUTPATIENT)
Dept: HEMATOLOGY/ONCOLOGY | Facility: CLINIC | Age: 61
End: 2021-07-02

## 2021-09-27 ENCOUNTER — OFFICE VISIT (OUTPATIENT)
Dept: PULMONOLOGY | Facility: CLINIC | Age: 61
End: 2021-09-27
Payer: MEDICARE

## 2021-09-27 VITALS
HEIGHT: 71 IN | WEIGHT: 203.38 LBS | DIASTOLIC BLOOD PRESSURE: 66 MMHG | OXYGEN SATURATION: 98 % | BODY MASS INDEX: 28.47 KG/M2 | HEART RATE: 60 BPM | SYSTOLIC BLOOD PRESSURE: 132 MMHG

## 2021-09-27 DIAGNOSIS — J44.1 COPD EXACERBATION: ICD-10-CM

## 2021-09-27 DIAGNOSIS — J82.83 EOSINOPHILIC ASTHMA: ICD-10-CM

## 2021-09-27 DIAGNOSIS — G25.81 RESTLESS LEG SYNDROME, CONTROLLED: ICD-10-CM

## 2021-09-27 DIAGNOSIS — B02.8 HERPES ZOSTER WITH COMPLICATION: ICD-10-CM

## 2021-09-27 DIAGNOSIS — D84.9 IMMUNE DEFICIENCY DISORDER: ICD-10-CM

## 2021-09-27 DIAGNOSIS — J45.50 SEVERE PERSISTENT ASTHMA WITHOUT COMPLICATION: Primary | ICD-10-CM

## 2021-09-27 DIAGNOSIS — J45.51 SEVERE PERSISTENT ASTHMA WITH EXACERBATION: ICD-10-CM

## 2021-09-27 PROCEDURE — 99214 PR OFFICE/OUTPT VISIT, EST, LEVL IV, 30-39 MIN: ICD-10-PCS | Mod: S$PBB,,, | Performed by: INTERNAL MEDICINE

## 2021-09-27 PROCEDURE — 99999 PR PBB SHADOW E&M-EST. PATIENT-LVL V: CPT | Mod: PBBFAC,,, | Performed by: INTERNAL MEDICINE

## 2021-09-27 PROCEDURE — 99999 PR PBB SHADOW E&M-EST. PATIENT-LVL V: ICD-10-PCS | Mod: PBBFAC,,, | Performed by: INTERNAL MEDICINE

## 2021-09-27 PROCEDURE — 99215 OFFICE O/P EST HI 40 MIN: CPT | Mod: PBBFAC,PO | Performed by: INTERNAL MEDICINE

## 2021-09-27 PROCEDURE — 99214 OFFICE O/P EST MOD 30 MIN: CPT | Mod: S$PBB,,, | Performed by: INTERNAL MEDICINE

## 2021-09-27 RX ORDER — BENRALIZUMAB 30 MG/ML
30 INJECTION, SOLUTION SUBCUTANEOUS
Qty: 1 SYRINGE | Refills: 5 | Status: SHIPPED | OUTPATIENT
Start: 2021-12-27 | End: 2022-04-11

## 2021-09-27 RX ORDER — AZITHROMYCIN 500 MG/1
500 TABLET, FILM COATED ORAL
Qty: 12 TABLET | Refills: 11 | Status: SHIPPED | OUTPATIENT
Start: 2021-09-28 | End: 2022-06-01 | Stop reason: SDUPTHER

## 2021-09-27 RX ORDER — PREDNISONE 10 MG/1
TABLET ORAL
Qty: 42 TABLET | Refills: 2 | Status: SHIPPED | OUTPATIENT
Start: 2021-09-27 | End: 2022-04-11 | Stop reason: SDUPTHER

## 2021-09-27 RX ORDER — VALACYCLOVIR HYDROCHLORIDE 1 G/1
1000 TABLET, FILM COATED ORAL 3 TIMES DAILY
Qty: 30 TABLET | Refills: 0 | Status: SHIPPED | OUTPATIENT
Start: 2021-09-27 | End: 2022-12-01

## 2021-09-27 RX ORDER — BENRALIZUMAB 30 MG/ML
30 INJECTION, SOLUTION SUBCUTANEOUS
Qty: 1 SYRINGE | Refills: 1 | Status: SHIPPED | OUTPATIENT
Start: 2021-10-27 | End: 2022-04-11

## 2021-09-27 RX ORDER — CLONAZEPAM 0.25 MG/1
0.5 TABLET, ORALLY DISINTEGRATING ORAL NIGHTLY
Qty: 60 TABLET | Refills: 2 | Status: SHIPPED | OUTPATIENT
Start: 2021-09-27 | End: 2022-12-01 | Stop reason: SDUPTHER

## 2021-09-28 ENCOUNTER — TELEPHONE (OUTPATIENT)
Dept: PULMONOLOGY | Facility: CLINIC | Age: 61
End: 2021-09-28

## 2021-10-09 ENCOUNTER — SPECIALTY PHARMACY (OUTPATIENT)
Dept: PHARMACY | Facility: CLINIC | Age: 61
End: 2021-10-09
Payer: MEDICARE

## 2021-10-09 NOTE — TELEPHONE ENCOUNTER
Fasenra PA approved through 4/9/22  Case ID: [id number]    Test claim shows a $1,490.84 copay - Forwarding to BI/FA

## 2021-10-09 NOTE — TELEPHONE ENCOUNTER
Yesica KITCHEN submitted 10/9/21  [facility] Key: [id number]    Informed patient that [facility] received prescription for Fasenra and prior authorization is required. [facility] will be back in touch once insurance determination is received.

## 2021-10-11 NOTE — TELEPHONE ENCOUNTER
BENEFIT INVESTIGATION:  FASENRA    [employer] Medicare plan.   [facility] in network  Patient is in the donut hole  Estimated co pay: $1490.84  Co pay assistance required.   Forwarded to FA team for review.         MCP

## 2021-10-27 ENCOUNTER — TELEPHONE (OUTPATIENT)
Dept: PULMONOLOGY | Facility: CLINIC | Age: 61
End: 2021-10-27
Payer: MEDICARE

## 2021-10-27 NOTE — TELEPHONE ENCOUNTER
Provider's office messaged to check status on Fasenra. Informed [facility] that patient was pending FA as copy is high. Will forward to assigned pharmacist.     Per Paris, PharmD  Clinical Pharmacist  [facility]  P: [phone]

## 2021-11-02 ENCOUNTER — TELEPHONE (OUTPATIENT)
Dept: PULMONOLOGY | Facility: CLINIC | Age: 61
End: 2021-11-02
Payer: MEDICARE

## 2021-11-03 DIAGNOSIS — G47.33 SEVERE OBSTRUCTIVE SLEEP APNEA: Primary | ICD-10-CM

## 2021-11-04 ENCOUNTER — TELEPHONE (OUTPATIENT)
Dept: PULMONOLOGY | Facility: CLINIC | Age: 61
End: 2021-11-04
Payer: MEDICARE

## 2021-12-09 NOTE — TELEPHONE ENCOUNTER
STAT CHECK      Rep Jagruti who pointed out that the pt dated his signature with his date of birth and not the date. Will fix and refax.          MCP

## 2021-12-16 ENCOUNTER — OFFICE VISIT (OUTPATIENT)
Dept: PULMONOLOGY | Facility: CLINIC | Age: 61
End: 2021-12-16
Payer: MEDICARE

## 2021-12-16 ENCOUNTER — TELEPHONE (OUTPATIENT)
Dept: PULMONOLOGY | Facility: CLINIC | Age: 61
End: 2021-12-16

## 2021-12-16 VITALS
SYSTOLIC BLOOD PRESSURE: 132 MMHG | OXYGEN SATURATION: 97 % | HEART RATE: 59 BPM | DIASTOLIC BLOOD PRESSURE: 60 MMHG | BODY MASS INDEX: 28.09 KG/M2 | WEIGHT: 200.63 LBS | HEIGHT: 71 IN

## 2021-12-16 DIAGNOSIS — J45.50 SEVERE PERSISTENT ASTHMA WITHOUT COMPLICATION: ICD-10-CM

## 2021-12-16 DIAGNOSIS — G47.33 OBSTRUCTIVE SLEEP APNEA: ICD-10-CM

## 2021-12-16 DIAGNOSIS — J82.83 EOSINOPHILIC ASTHMA: Primary | ICD-10-CM

## 2021-12-16 PROCEDURE — 99999 PR PBB SHADOW E&M-EST. PATIENT-LVL III: ICD-10-PCS | Mod: PBBFAC,,, | Performed by: INTERNAL MEDICINE

## 2021-12-16 PROCEDURE — 99213 OFFICE O/P EST LOW 20 MIN: CPT | Mod: PBBFAC,PO | Performed by: INTERNAL MEDICINE

## 2021-12-16 PROCEDURE — 99999 PR PBB SHADOW E&M-EST. PATIENT-LVL III: CPT | Mod: PBBFAC,,, | Performed by: INTERNAL MEDICINE

## 2021-12-16 PROCEDURE — 99212 PR OFFICE/OUTPT VISIT, EST, LEVL II, 10-19 MIN: ICD-10-PCS | Mod: S$PBB,,, | Performed by: INTERNAL MEDICINE

## 2021-12-16 PROCEDURE — 99212 OFFICE O/P EST SF 10 MIN: CPT | Mod: S$PBB,,, | Performed by: INTERNAL MEDICINE

## 2021-12-16 RX ORDER — SERTRALINE HYDROCHLORIDE 50 MG/1
50 TABLET, FILM COATED ORAL
COMMUNITY
Start: 2021-10-13 | End: 2022-12-01 | Stop reason: SDUPTHER

## 2021-12-31 NOTE — TELEPHONE ENCOUNTER
Fax from [employer] stating that pt is not eligible for PAP due to income being too high. Patient has the option to appeal.  Will follow up 01/03.          MCP

## 2022-01-14 ENCOUNTER — TELEPHONE (OUTPATIENT)
Dept: PULMONOLOGY | Facility: CLINIC | Age: 62
End: 2022-01-14
Payer: MEDICARE

## 2022-01-14 ENCOUNTER — PATIENT MESSAGE (OUTPATIENT)
Dept: PHARMACY | Facility: CLINIC | Age: 62
End: 2022-01-14
Payer: MEDICARE

## 2022-01-14 NOTE — TELEPHONE ENCOUNTER
Sent pt a my chart message requesting he call to let us know if he wants to appeal the denail or not        MCP

## 2022-01-14 NOTE — TELEPHONE ENCOUNTER
Attempted to call patient to discuss fasenra appeal. No answer on his phone or on his spouse Chayito's phone number that we have listed in chart. Patient asked to call office back to discuss when/if he received voice message and wanted to discuss options for fasenra.    ----- Message from Guillermo Carter PharmD sent at 1/14/2022 11:58 AM CST -----  Regarding: Fasenra PAP denial  Good afternoon,    Unfortunately, the patient has been denied  assistance through [employer] for Fasenra due to his income. There is an option for the patient to appeal and we have attempted to reach him to discuss this. At this time, we will close his referral at [facility]. Please let us know if we can be of further assistance.    Thank you,    Guillermo Carter, PharmD  Clinical Pharmacist   [facility]   P: [phone]

## 2022-01-14 NOTE — TELEPHONE ENCOUNTER
Rasheed sent to inform [facility] of patient PAP denial and option to appeal. It appears they have attempted to reach the patient last month to discuss. At this time, will close referral at [facility] until further contact is made.

## 2022-01-18 ENCOUNTER — PATIENT MESSAGE (OUTPATIENT)
Dept: PULMONOLOGY | Facility: CLINIC | Age: 62
End: 2022-01-18
Payer: MEDICARE

## 2022-01-31 ENCOUNTER — PATIENT MESSAGE (OUTPATIENT)
Dept: PULMONOLOGY | Facility: CLINIC | Age: 62
End: 2022-01-31
Payer: MEDICARE

## 2022-03-31 ENCOUNTER — PATIENT MESSAGE (OUTPATIENT)
Dept: PULMONOLOGY | Facility: CLINIC | Age: 62
End: 2022-03-31
Payer: MEDICARE

## 2022-03-31 DIAGNOSIS — D84.9 IMMUNE DEFICIENCY DISORDER: ICD-10-CM

## 2022-03-31 RX ORDER — DOXYCYCLINE 100 MG/1
100 CAPSULE ORAL DAILY
Qty: 90 CAPSULE | Refills: 1 | Status: SHIPPED | OUTPATIENT
Start: 2022-03-31 | End: 2022-04-11 | Stop reason: SDUPTHER

## 2022-04-11 ENCOUNTER — OFFICE VISIT (OUTPATIENT)
Dept: PULMONOLOGY | Facility: CLINIC | Age: 62
End: 2022-04-11
Payer: MEDICARE

## 2022-04-11 VITALS
HEART RATE: 56 BPM | SYSTOLIC BLOOD PRESSURE: 138 MMHG | DIASTOLIC BLOOD PRESSURE: 78 MMHG | WEIGHT: 192.56 LBS | HEIGHT: 71 IN | OXYGEN SATURATION: 98 % | BODY MASS INDEX: 26.96 KG/M2

## 2022-04-11 DIAGNOSIS — C91.10 CLL (CHRONIC LYMPHOCYTIC LEUKEMIA): Primary | ICD-10-CM

## 2022-04-11 DIAGNOSIS — G47.33 OSA (OBSTRUCTIVE SLEEP APNEA): ICD-10-CM

## 2022-04-11 DIAGNOSIS — J82.83 EOSINOPHILIC ASTHMA: ICD-10-CM

## 2022-04-11 DIAGNOSIS — J47.9 BRONCHIECTASIS WITHOUT COMPLICATION: ICD-10-CM

## 2022-04-11 DIAGNOSIS — G25.81 RLS (RESTLESS LEGS SYNDROME): ICD-10-CM

## 2022-04-11 DIAGNOSIS — J45.50 SEVERE PERSISTENT ASTHMA WITHOUT COMPLICATION: ICD-10-CM

## 2022-04-11 DIAGNOSIS — D84.9 IMMUNE DEFICIENCY DISORDER: ICD-10-CM

## 2022-04-11 DIAGNOSIS — J68.3: ICD-10-CM

## 2022-04-11 PROCEDURE — 99213 PR OFFICE/OUTPT VISIT, EST, LEVL III, 20-29 MIN: ICD-10-PCS | Mod: S$PBB,,, | Performed by: INTERNAL MEDICINE

## 2022-04-11 PROCEDURE — 99214 OFFICE O/P EST MOD 30 MIN: CPT | Mod: PBBFAC,PO | Performed by: INTERNAL MEDICINE

## 2022-04-11 PROCEDURE — 99999 PR PBB SHADOW E&M-EST. PATIENT-LVL IV: CPT | Mod: PBBFAC,,, | Performed by: INTERNAL MEDICINE

## 2022-04-11 PROCEDURE — 99999 PR PBB SHADOW E&M-EST. PATIENT-LVL IV: ICD-10-PCS | Mod: PBBFAC,,, | Performed by: INTERNAL MEDICINE

## 2022-04-11 PROCEDURE — 99213 OFFICE O/P EST LOW 20 MIN: CPT | Mod: S$PBB,,, | Performed by: INTERNAL MEDICINE

## 2022-04-11 RX ORDER — TRAZODONE HYDROCHLORIDE 50 MG/1
50 TABLET ORAL
COMMUNITY
Start: 2022-03-30 | End: 2022-12-01

## 2022-04-11 RX ORDER — BUDESONIDE, GLYCOPYRROLATE, AND FORMOTEROL FUMARATE 160; 9; 4.8 UG/1; UG/1; UG/1
2 AEROSOL, METERED RESPIRATORY (INHALATION) 2 TIMES DAILY
Qty: 32.1 G | Refills: 3 | Status: SHIPPED | OUTPATIENT
Start: 2022-04-11 | End: 2022-12-01 | Stop reason: SDUPTHER

## 2022-04-11 RX ORDER — ALBUTEROL SULFATE 90 UG/1
2 AEROSOL, METERED RESPIRATORY (INHALATION) EVERY 6 HOURS PRN
COMMUNITY
Start: 2022-01-30 | End: 2022-04-11 | Stop reason: SDUPTHER

## 2022-04-11 RX ORDER — FUROSEMIDE 20 MG/1
20 TABLET ORAL
COMMUNITY
Start: 2022-03-11 | End: 2023-10-04 | Stop reason: ALTCHOICE

## 2022-04-11 RX ORDER — ALBUTEROL SULFATE 90 UG/1
2 AEROSOL, METERED RESPIRATORY (INHALATION) EVERY 6 HOURS PRN
Qty: 18 G | Refills: 11 | Status: SHIPPED | OUTPATIENT
Start: 2022-04-11 | End: 2022-12-01 | Stop reason: SDUPTHER

## 2022-04-11 RX ORDER — GABAPENTIN 300 MG/1
300 CAPSULE ORAL 3 TIMES DAILY
Qty: 90 CAPSULE | Refills: 3 | Status: SHIPPED | OUTPATIENT
Start: 2022-04-11 | End: 2022-12-01 | Stop reason: SDUPTHER

## 2022-04-11 RX ORDER — PREDNISONE 10 MG/1
TABLET ORAL
Qty: 42 TABLET | Refills: 2 | Status: SHIPPED | OUTPATIENT
Start: 2022-04-11 | End: 2022-12-01 | Stop reason: SDUPTHER

## 2022-04-11 RX ORDER — DOXYCYCLINE 100 MG/1
100 CAPSULE ORAL DAILY
Qty: 90 CAPSULE | Refills: 1 | Status: SHIPPED | OUTPATIENT
Start: 2022-04-11 | End: 2022-05-11
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
## 2022-04-11 NOTE — PATIENT INSTRUCTIONS
Continue Doxycycline per day - refills sent. If you develop yellow/green mucous can take Doxycycline twice per day.    Prednisone to be taken as needed - refill sent.    Standing order for sputum culture, if you develop green/yellow sputum can submit sample.     Gabapentin for restless leg syndrome - can do 300 mg three times per day. Can increase to 600 mg at night time and 300 mg in the morning. May make drowsy.     Continue current medication regiment. Keep follow up appointment as scheduled. Please call the office if you have any questions or concerns.

## 2022-04-19 ENCOUNTER — TELEPHONE (OUTPATIENT)
Dept: PULMONOLOGY | Facility: CLINIC | Age: 62
End: 2022-04-19
Payer: MEDICARE

## 2022-06-01 DIAGNOSIS — D84.9 IMMUNE DEFICIENCY DISORDER: ICD-10-CM

## 2022-06-02 RX ORDER — AZITHROMYCIN 500 MG/1
500 TABLET, FILM COATED ORAL
Qty: 12 TABLET | Refills: 11 | Status: SHIPPED | OUTPATIENT
Start: 2022-06-02 | End: 2022-12-01

## 2022-12-01 ENCOUNTER — OFFICE VISIT (OUTPATIENT)
Dept: PULMONOLOGY | Facility: CLINIC | Age: 62
End: 2022-12-01
Payer: MEDICARE

## 2022-12-01 VITALS
HEIGHT: 71 IN | HEART RATE: 60 BPM | WEIGHT: 202.19 LBS | BODY MASS INDEX: 28.31 KG/M2 | OXYGEN SATURATION: 97 % | SYSTOLIC BLOOD PRESSURE: 153 MMHG | DIASTOLIC BLOOD PRESSURE: 86 MMHG

## 2022-12-01 DIAGNOSIS — J82.83 EOSINOPHILIC ASTHMA: ICD-10-CM

## 2022-12-01 DIAGNOSIS — J68.3: ICD-10-CM

## 2022-12-01 DIAGNOSIS — R09.89 CHRONIC SINUS COMPLAINTS: ICD-10-CM

## 2022-12-01 DIAGNOSIS — G25.81 RLS (RESTLESS LEGS SYNDROME): ICD-10-CM

## 2022-12-01 DIAGNOSIS — J45.50 SEVERE PERSISTENT ASTHMA WITHOUT COMPLICATION: ICD-10-CM

## 2022-12-01 DIAGNOSIS — F32.9 REACTIVE DEPRESSION: ICD-10-CM

## 2022-12-01 DIAGNOSIS — G62.9 NEUROPATHY: Primary | ICD-10-CM

## 2022-12-01 DIAGNOSIS — G25.81 RESTLESS LEG SYNDROME, CONTROLLED: ICD-10-CM

## 2022-12-01 DIAGNOSIS — D84.9 IMMUNE DEFICIENCY DISORDER, DISEASE OR SYNDROME: ICD-10-CM

## 2022-12-01 PROCEDURE — 99999 PR PBB SHADOW E&M-EST. PATIENT-LVL IV: CPT | Mod: PBBFAC,,, | Performed by: INTERNAL MEDICINE

## 2022-12-01 PROCEDURE — 99214 PR OFFICE/OUTPT VISIT, EST, LEVL IV, 30-39 MIN: ICD-10-PCS | Mod: S$PBB,,, | Performed by: INTERNAL MEDICINE

## 2022-12-01 PROCEDURE — 99214 OFFICE O/P EST MOD 30 MIN: CPT | Mod: PBBFAC,PO | Performed by: INTERNAL MEDICINE

## 2022-12-01 PROCEDURE — 99999 PR PBB SHADOW E&M-EST. PATIENT-LVL IV: ICD-10-PCS | Mod: PBBFAC,,, | Performed by: INTERNAL MEDICINE

## 2022-12-01 PROCEDURE — 99214 OFFICE O/P EST MOD 30 MIN: CPT | Mod: S$PBB,,, | Performed by: INTERNAL MEDICINE

## 2022-12-01 RX ORDER — MONTELUKAST SODIUM 10 MG/1
10 TABLET ORAL NIGHTLY
Qty: 90 TABLET | Refills: 3 | Status: SHIPPED | OUTPATIENT
Start: 2022-12-01

## 2022-12-01 RX ORDER — PREDNISONE 10 MG/1
TABLET ORAL
Qty: 42 TABLET | Refills: 2 | Status: SHIPPED | OUTPATIENT
Start: 2022-12-01

## 2022-12-01 RX ORDER — GABAPENTIN 300 MG/1
300 CAPSULE ORAL 3 TIMES DAILY
Qty: 90 CAPSULE | Refills: 3 | Status: SHIPPED | OUTPATIENT
Start: 2022-12-01 | End: 2023-10-04 | Stop reason: SDUPTHER

## 2022-12-01 RX ORDER — SERTRALINE HYDROCHLORIDE 50 MG/1
50 TABLET, FILM COATED ORAL DAILY
Qty: 90 TABLET | Refills: 3 | Status: SHIPPED | OUTPATIENT
Start: 2022-12-01 | End: 2023-04-03

## 2022-12-01 RX ORDER — LEVOFLOXACIN 500 MG/1
500 TABLET, FILM COATED ORAL DAILY
Qty: 10 TABLET | Refills: 2 | Status: SHIPPED | OUTPATIENT
Start: 2022-12-01

## 2022-12-01 RX ORDER — ALBUTEROL SULFATE 90 UG/1
2 AEROSOL, METERED RESPIRATORY (INHALATION) EVERY 6 HOURS PRN
Qty: 54 G | Refills: 3 | Status: SHIPPED | OUTPATIENT
Start: 2022-12-01

## 2022-12-01 RX ORDER — DOXYCYCLINE 100 MG/1
100 CAPSULE ORAL DAILY
Qty: 90 CAPSULE | Refills: 3 | Status: SHIPPED | OUTPATIENT
Start: 2022-12-01 | End: 2023-10-04 | Stop reason: SDUPTHER

## 2022-12-01 RX ORDER — CLONAZEPAM 0.25 MG/1
0.5 TABLET, ORALLY DISINTEGRATING ORAL NIGHTLY
Qty: 60 TABLET | Refills: 4 | Status: SHIPPED | OUTPATIENT
Start: 2022-12-01 | End: 2023-04-03 | Stop reason: SDUPTHER

## 2022-12-01 RX ORDER — BUDESONIDE, GLYCOPYRROLATE, AND FORMOTEROL FUMARATE 160; 9; 4.8 UG/1; UG/1; UG/1
2 AEROSOL, METERED RESPIRATORY (INHALATION) 2 TIMES DAILY
Qty: 32.1 G | Refills: 3 | Status: SHIPPED | OUTPATIENT
Start: 2022-12-01
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
## 2022-12-01 NOTE — PATIENT INSTRUCTIONS
Use levaquin severe infection but use doxy daily    Avoid and use prednisone as needed.      Use gabapentin for diffuse pains    Use breztri    Use albuterol as needed, ok to use 2-4 puffs or nebulizer as needed

## 2023-04-03 ENCOUNTER — PATIENT MESSAGE (OUTPATIENT)
Dept: PULMONOLOGY | Facility: CLINIC | Age: 63
End: 2023-04-03

## 2023-04-03 ENCOUNTER — OFFICE VISIT (OUTPATIENT)
Dept: PULMONOLOGY | Facility: CLINIC | Age: 63
End: 2023-04-03
Payer: MEDICARE

## 2023-04-03 VITALS
WEIGHT: 197.06 LBS | BODY MASS INDEX: 27.59 KG/M2 | HEART RATE: 60 BPM | DIASTOLIC BLOOD PRESSURE: 81 MMHG | HEIGHT: 71 IN | OXYGEN SATURATION: 96 % | SYSTOLIC BLOOD PRESSURE: 143 MMHG

## 2023-04-03 DIAGNOSIS — G25.81 RESTLESS LEG SYNDROME, CONTROLLED: ICD-10-CM

## 2023-04-03 DIAGNOSIS — C91.10 CLL (CHRONIC LYMPHOCYTIC LEUKEMIA): ICD-10-CM

## 2023-04-03 DIAGNOSIS — R53.83 FATIGUE, UNSPECIFIED TYPE: Primary | ICD-10-CM

## 2023-04-03 DIAGNOSIS — R73.03 PREDIABETES: ICD-10-CM

## 2023-04-03 DIAGNOSIS — M25.60 MORNING JOINT STIFFNESS: ICD-10-CM

## 2023-04-03 PROCEDURE — 99214 OFFICE O/P EST MOD 30 MIN: CPT | Mod: S$PBB,,, | Performed by: INTERNAL MEDICINE

## 2023-04-03 PROCEDURE — 99214 PR OFFICE/OUTPT VISIT, EST, LEVL IV, 30-39 MIN: ICD-10-PCS | Mod: S$PBB,,, | Performed by: INTERNAL MEDICINE

## 2023-04-03 PROCEDURE — 99999 PR PBB SHADOW E&M-EST. PATIENT-LVL V: ICD-10-PCS | Mod: PBBFAC,,, | Performed by: INTERNAL MEDICINE

## 2023-04-03 PROCEDURE — 99999 PR PBB SHADOW E&M-EST. PATIENT-LVL V: CPT | Mod: PBBFAC,,, | Performed by: INTERNAL MEDICINE

## 2023-04-03 PROCEDURE — 99215 OFFICE O/P EST HI 40 MIN: CPT | Mod: PBBFAC,PO | Performed by: INTERNAL MEDICINE

## 2023-04-03 RX ORDER — CLONAZEPAM 0.25 MG/1
0.5 TABLET, ORALLY DISINTEGRATING ORAL NIGHTLY
Qty: 60 TABLET | Refills: 4 | Status: SHIPPED | OUTPATIENT
Start: 2023-04-03 | End: 2023-10-04 | Stop reason: SDUPTHER

## 2023-04-03 NOTE — PATIENT INSTRUCTIONS
May need thyroid and testosterone test? You had upper and lower endoscopy last yr or so.  Hemoglobin a1c was min elevated 1/2023    Would check chest xray.      Severe fatigue and morning stiffness (15-20 min) -- major problem.      May wish to f/u blood doctor.      Ordered thyroid and testosterone levels and blood and hemoglobin a1c-- had 6.1 prediabetic last Jan 2022.
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
## 2023-10-04 ENCOUNTER — OFFICE VISIT (OUTPATIENT)
Dept: PULMONOLOGY | Facility: CLINIC | Age: 63
End: 2023-10-04
Payer: MEDICARE

## 2023-10-04 VITALS
DIASTOLIC BLOOD PRESSURE: 81 MMHG | SYSTOLIC BLOOD PRESSURE: 136 MMHG | WEIGHT: 197.06 LBS | OXYGEN SATURATION: 98 % | HEART RATE: 64 BPM | HEIGHT: 71 IN | BODY MASS INDEX: 27.59 KG/M2

## 2023-10-04 DIAGNOSIS — J45.50 SEVERE PERSISTENT ASTHMA WITHOUT COMPLICATION: Primary | ICD-10-CM

## 2023-10-04 DIAGNOSIS — D84.9 IMMUNE DEFICIENCY DISORDER, DISEASE OR SYNDROME: ICD-10-CM

## 2023-10-04 DIAGNOSIS — G25.81 RLS (RESTLESS LEGS SYNDROME): ICD-10-CM

## 2023-10-04 DIAGNOSIS — G62.9 NEUROPATHY: ICD-10-CM

## 2023-10-04 DIAGNOSIS — G25.81 RESTLESS LEG SYNDROME, CONTROLLED: ICD-10-CM

## 2023-10-04 PROCEDURE — 99999 PR PBB SHADOW E&M-EST. PATIENT-LVL IV: ICD-10-PCS | Mod: PBBFAC,,, | Performed by: INTERNAL MEDICINE

## 2023-10-04 PROCEDURE — 99999 PR PBB SHADOW E&M-EST. PATIENT-LVL IV: CPT | Mod: PBBFAC,,, | Performed by: INTERNAL MEDICINE

## 2023-10-04 PROCEDURE — 99213 PR OFFICE/OUTPT VISIT, EST, LEVL III, 20-29 MIN: ICD-10-PCS | Mod: S$PBB,,, | Performed by: INTERNAL MEDICINE

## 2023-10-04 PROCEDURE — 99214 OFFICE O/P EST MOD 30 MIN: CPT | Mod: PBBFAC,PO | Performed by: INTERNAL MEDICINE

## 2023-10-04 PROCEDURE — 99213 OFFICE O/P EST LOW 20 MIN: CPT | Mod: S$PBB,,, | Performed by: INTERNAL MEDICINE

## 2023-10-04 RX ORDER — ACYCLOVIR 400 MG/1
400 TABLET ORAL 2 TIMES DAILY
COMMUNITY

## 2023-10-04 RX ORDER — GABAPENTIN 300 MG/1
300 CAPSULE ORAL 3 TIMES DAILY
Qty: 90 CAPSULE | Refills: 3 | Status: SHIPPED | OUTPATIENT
Start: 2023-10-04 | End: 2024-10-03

## 2023-10-04 RX ORDER — DOXYCYCLINE 100 MG/1
100 CAPSULE ORAL DAILY
Qty: 90 CAPSULE | Refills: 3 | Status: SHIPPED | OUTPATIENT
Start: 2023-10-04

## 2023-10-04 RX ORDER — ENTECAVIR 0.5 MG/1
0.5 TABLET, FILM COATED ORAL DAILY
COMMUNITY

## 2023-10-04 RX ORDER — CLONAZEPAM 0.25 MG/1
0.5 TABLET, ORALLY DISINTEGRATING ORAL NIGHTLY
Qty: 60 TABLET | Refills: 4 | Status: SHIPPED | OUTPATIENT
Start: 2023-10-04
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
## 2023-10-04 NOTE — PATIENT INSTRUCTIONS
Lungs doing well    Continue doxy --    May be able to stop if immune system recovers    Klonopin renewed.  Folllow up in a yr or as needed.

## 2024-10-28 ENCOUNTER — OFFICE VISIT (OUTPATIENT)
Dept: PULMONOLOGY | Facility: CLINIC | Age: 64
End: 2024-10-28
Payer: MEDICARE

## 2024-10-28 ENCOUNTER — LAB VISIT (OUTPATIENT)
Dept: LAB | Facility: HOSPITAL | Age: 64
End: 2024-10-28
Attending: INTERNAL MEDICINE
Payer: MEDICARE

## 2024-10-28 VITALS
BODY MASS INDEX: 29.29 KG/M2 | HEIGHT: 71 IN | HEART RATE: 70 BPM | SYSTOLIC BLOOD PRESSURE: 136 MMHG | WEIGHT: 209.19 LBS | DIASTOLIC BLOOD PRESSURE: 76 MMHG | OXYGEN SATURATION: 96 %

## 2024-10-28 DIAGNOSIS — J41.1 BRONCHITIS, CHRONIC, MUCOPURULENT: Primary | ICD-10-CM

## 2024-10-28 DIAGNOSIS — J45.50 SEVERE PERSISTENT ASTHMA WITHOUT COMPLICATION: ICD-10-CM

## 2024-10-28 DIAGNOSIS — D84.9 IMMUNE DEFICIENCY DISORDER, DISEASE OR SYNDROME: ICD-10-CM

## 2024-10-28 DIAGNOSIS — G25.81 RESTLESS LEG SYNDROME, CONTROLLED: ICD-10-CM

## 2024-10-28 DIAGNOSIS — J68.3: ICD-10-CM

## 2024-10-28 PROCEDURE — 87205 SMEAR GRAM STAIN: CPT | Performed by: INTERNAL MEDICINE

## 2024-10-28 PROCEDURE — 99999 PR PBB SHADOW E&M-EST. PATIENT-LVL III: CPT | Mod: PBBFAC,,, | Performed by: INTERNAL MEDICINE

## 2024-10-28 PROCEDURE — 99214 OFFICE O/P EST MOD 30 MIN: CPT | Mod: S$PBB,,, | Performed by: INTERNAL MEDICINE

## 2024-10-28 PROCEDURE — 87070 CULTURE OTHR SPECIMN AEROBIC: CPT | Performed by: INTERNAL MEDICINE

## 2024-10-28 PROCEDURE — 99213 OFFICE O/P EST LOW 20 MIN: CPT | Mod: PBBFAC,PO | Performed by: INTERNAL MEDICINE

## 2024-10-28 RX ORDER — FERROUS SULFATE 325(65) MG
1 TABLET, DELAYED RELEASE (ENTERIC COATED) ORAL DAILY
COMMUNITY
Start: 2024-06-26 | End: 2025-06-26

## 2024-10-28 RX ORDER — MIRABEGRON 50 MG/1
50 TABLET, FILM COATED, EXTENDED RELEASE ORAL
COMMUNITY
Start: 2024-04-02 | End: 2025-04-02

## 2024-10-28 RX ORDER — CLONAZEPAM 0.25 MG/1
0.5 TABLET, ORALLY DISINTEGRATING ORAL NIGHTLY
Qty: 60 TABLET | Refills: 4 | Status: SHIPPED | OUTPATIENT
Start: 2024-10-28

## 2024-10-28 RX ORDER — BUDESONIDE, GLYCOPYRROLATE, AND FORMOTEROL FUMARATE 160; 9; 4.8 UG/1; UG/1; UG/1
2 AEROSOL, METERED RESPIRATORY (INHALATION) 2 TIMES DAILY
Qty: 32.1 G | Refills: 3 | Status: SHIPPED | OUTPATIENT
Start: 2024-10-28

## 2024-10-28 RX ORDER — ALBUTEROL SULFATE 90 UG/1
2 INHALANT RESPIRATORY (INHALATION) EVERY 6 HOURS PRN
Qty: 54 G | Refills: 3 | Status: SHIPPED | OUTPATIENT
Start: 2024-10-28

## 2024-10-28 RX ORDER — LEVOFLOXACIN 500 MG/1
500 TABLET, FILM COATED ORAL DAILY
Qty: 14 TABLET | Refills: 2 | Status: SHIPPED | OUTPATIENT
Start: 2024-10-28

## 2024-10-28 RX ORDER — DOXYCYCLINE 100 MG/1
100 CAPSULE ORAL DAILY
Qty: 90 CAPSULE | Refills: 3 | Status: SHIPPED | OUTPATIENT
Start: 2024-10-28

## 2024-10-28 RX ORDER — PREDNISONE 10 MG/1
TABLET ORAL
Qty: 42 TABLET | Refills: 2 | Status: SHIPPED | OUTPATIENT
Start: 2024-10-28

## 2024-10-28 RX ORDER — FUROSEMIDE 40 MG/1
40 TABLET ORAL
COMMUNITY
Start: 2024-03-21 | End: 2025-05-10

## 2024-10-30 LAB
BACTERIA SPEC AEROBE CULT: NORMAL
GRAM STN SPEC: NORMAL

## 2024-10-31 DIAGNOSIS — J41.1 BRONCHITIS, CHRONIC, MUCOPURULENT: Primary | ICD-10-CM

## 2025-01-28 ENCOUNTER — OFFICE VISIT (OUTPATIENT)
Dept: PULMONOLOGY | Facility: CLINIC | Age: 65
End: 2025-01-28
Payer: MEDICARE

## 2025-01-28 ENCOUNTER — LAB VISIT (OUTPATIENT)
Dept: LAB | Facility: HOSPITAL | Age: 65
End: 2025-01-28
Attending: INTERNAL MEDICINE
Payer: MEDICARE

## 2025-01-28 VITALS
OXYGEN SATURATION: 99 % | WEIGHT: 217.81 LBS | BODY MASS INDEX: 30.38 KG/M2 | HEART RATE: 65 BPM | SYSTOLIC BLOOD PRESSURE: 151 MMHG | DIASTOLIC BLOOD PRESSURE: 87 MMHG

## 2025-01-28 DIAGNOSIS — R09.89 CHRONIC SINUS COMPLAINTS: ICD-10-CM

## 2025-01-28 DIAGNOSIS — D84.9 IMMUNE DEFICIENCY DISORDER, DISEASE OR SYNDROME: ICD-10-CM

## 2025-01-28 DIAGNOSIS — M19.90 ARTHRITIS: ICD-10-CM

## 2025-01-28 DIAGNOSIS — J41.1 BRONCHITIS, CHRONIC, MUCOPURULENT: ICD-10-CM

## 2025-01-28 DIAGNOSIS — J45.50 SEVERE PERSISTENT ASTHMA WITHOUT COMPLICATION: Primary | ICD-10-CM

## 2025-01-28 DIAGNOSIS — D84.9 IMMUNE DEFICIENCY DISORDER: ICD-10-CM

## 2025-01-28 PROCEDURE — 87070 CULTURE OTHR SPECIMN AEROBIC: CPT | Performed by: INTERNAL MEDICINE

## 2025-01-28 PROCEDURE — 99999 PR PBB SHADOW E&M-EST. PATIENT-LVL IV: CPT | Mod: PBBFAC,,, | Performed by: INTERNAL MEDICINE

## 2025-01-28 PROCEDURE — 99214 OFFICE O/P EST MOD 30 MIN: CPT | Mod: PBBFAC,PO | Performed by: INTERNAL MEDICINE

## 2025-01-28 PROCEDURE — 87205 SMEAR GRAM STAIN: CPT | Performed by: INTERNAL MEDICINE

## 2025-01-28 PROCEDURE — 99214 OFFICE O/P EST MOD 30 MIN: CPT | Mod: S$PBB,,, | Performed by: INTERNAL MEDICINE

## 2025-01-28 RX ORDER — CELECOXIB 100 MG/1
200 CAPSULE ORAL DAILY PRN
Qty: 60 CAPSULE | Refills: 2 | Status: SHIPPED | OUTPATIENT
Start: 2025-01-28

## 2025-01-28 RX ORDER — OSELTAMIVIR PHOSPHATE 75 MG/1
75 CAPSULE ORAL 2 TIMES DAILY
Qty: 10 CAPSULE | Refills: 0 | Status: SHIPPED | OUTPATIENT
Start: 2025-01-28 | End: 2025-02-02

## 2025-01-28 RX ORDER — MELOXICAM 7.5 MG/1
15 TABLET ORAL DAILY PRN
Qty: 60 TABLET | Refills: 2 | Status: SHIPPED | OUTPATIENT
Start: 2025-01-28

## 2025-01-28 RX ORDER — GUAIFENESIN 600 MG/1
1200 TABLET, EXTENDED RELEASE ORAL
COMMUNITY
Start: 2024-11-22 | End: 2025-02-20

## 2025-01-28 RX ORDER — MONTELUKAST SODIUM 10 MG/1
1 TABLET ORAL NIGHTLY
COMMUNITY
Start: 2024-11-22 | End: 2025-11-22

## 2025-01-28 RX ORDER — ACETAMINOPHEN 325 MG/1
2 TABLET ORAL EVERY 6 HOURS PRN
COMMUNITY

## 2025-01-28 RX ORDER — AMOXICILLIN AND CLAVULANATE POTASSIUM 875; 125 MG/1; MG/1
1 TABLET, FILM COATED ORAL 2 TIMES DAILY
Qty: 28 TABLET | Refills: 2 | Status: SHIPPED | OUTPATIENT
Start: 2025-01-28

## 2025-01-28 NOTE — PATIENT INSTRUCTIONS
Try celebrex or mobic (avoid bc on either) for arthritis.    Trial augmentin for sinus infections-- may use more regular if good benefit-- may use as preventative antibiotic if effective- once daily ?  Call if needed.    Use tamiflu if flu    Prednisone may help-- you have severe asthma as part of immune problem    Repeat sputum culture  Would do chest xray in future....

## 2025-01-31 LAB
BACTERIA SPEC AEROBE CULT: NORMAL
GRAM STN SPEC: NORMAL

## (undated) DEVICE — SET IRR URLGY 2LINE UNIV SPIKE

## (undated) DEVICE — SOL WATER STRL IRR 1000ML

## (undated) DEVICE — SEE MEDLINE ITEM 157185

## (undated) DEVICE — SEE MEDLINE ITEM 157117

## (undated) DEVICE — SCRUB HIBICLENS 4% CHG 4OZ

## (undated) DEVICE — NDL INJETAK ADJ TIP 35CM

## (undated) DEVICE — NDL INJETAK ADJ TIP 70CM

## (undated) DEVICE — SLEEVE SCD EXPRESS CALF MEDIUM

## (undated) DEVICE — SOL IRR WATER STRL 3000 ML

## (undated) DEVICE — GAUZE SPONGE BULKEE 6X6.75IN

## (undated) DEVICE — SEE MEDLINE ITEM 152487

## (undated) DEVICE — NDL WILLIAMS CYSTOSCOPIC

## (undated) DEVICE — GLOVE SURG ULTRA TOUCH 7.5

## (undated) DEVICE — SYR 10CC LUER LOCK